# Patient Record
Sex: MALE | Race: WHITE | Employment: FULL TIME | URBAN - METROPOLITAN AREA
[De-identification: names, ages, dates, MRNs, and addresses within clinical notes are randomized per-mention and may not be internally consistent; named-entity substitution may affect disease eponyms.]

---

## 2019-04-01 ENCOUNTER — OFFICE VISIT (OUTPATIENT)
Dept: FAMILY MEDICINE CLINIC | Age: 54
End: 2019-04-01
Payer: COMMERCIAL

## 2019-04-01 ENCOUNTER — TELEPHONE (OUTPATIENT)
Dept: FAMILY MEDICINE CLINIC | Age: 54
End: 2019-04-01

## 2019-04-01 VITALS
WEIGHT: 172 LBS | HEART RATE: 87 BPM | BODY MASS INDEX: 26.07 KG/M2 | DIASTOLIC BLOOD PRESSURE: 84 MMHG | HEIGHT: 68 IN | SYSTOLIC BLOOD PRESSURE: 136 MMHG | OXYGEN SATURATION: 98 %

## 2019-04-01 DIAGNOSIS — R10.13 EPIGASTRIC PAIN: ICD-10-CM

## 2019-04-01 DIAGNOSIS — Z00.00 PHYSICAL EXAM: ICD-10-CM

## 2019-04-01 DIAGNOSIS — Z76.89 ENCOUNTER TO ESTABLISH CARE: ICD-10-CM

## 2019-04-01 DIAGNOSIS — M25.512 LEFT SHOULDER PAIN, UNSPECIFIED CHRONICITY: ICD-10-CM

## 2019-04-01 DIAGNOSIS — Z76.89 ENCOUNTER TO ESTABLISH CARE: Primary | ICD-10-CM

## 2019-04-01 LAB
A/G RATIO: 1.9 (ref 1.1–2.2)
ALBUMIN SERPL-MCNC: 4.3 G/DL (ref 3.4–5)
ALP BLD-CCNC: 98 U/L (ref 40–129)
ALT SERPL-CCNC: 11 U/L (ref 10–40)
ANION GAP SERPL CALCULATED.3IONS-SCNC: 13 MMOL/L (ref 3–16)
ANISOCYTOSIS: ABNORMAL
AST SERPL-CCNC: 14 U/L (ref 15–37)
BASOPHILS ABSOLUTE: 0 K/UL (ref 0–0.2)
BASOPHILS RELATIVE PERCENT: 0.5 %
BILIRUB SERPL-MCNC: 0.3 MG/DL (ref 0–1)
BUN BLDV-MCNC: 15 MG/DL (ref 7–20)
CALCIUM SERPL-MCNC: 8.8 MG/DL (ref 8.3–10.6)
CHLORIDE BLD-SCNC: 103 MMOL/L (ref 99–110)
CHOLESTEROL, TOTAL: 153 MG/DL (ref 0–199)
CO2: 25 MMOL/L (ref 21–32)
CREAT SERPL-MCNC: 0.7 MG/DL (ref 0.9–1.3)
EOSINOPHILS ABSOLUTE: 0.2 K/UL (ref 0–0.6)
EOSINOPHILS RELATIVE PERCENT: 3.2 %
GFR AFRICAN AMERICAN: >60
GFR NON-AFRICAN AMERICAN: >60
GLOBULIN: 2.3 G/DL
GLUCOSE BLD-MCNC: 117 MG/DL (ref 70–99)
HCT VFR BLD CALC: 22.9 % (ref 40.5–52.5)
HDLC SERPL-MCNC: 40 MG/DL (ref 40–60)
HEMATOLOGY PATH CONSULT: YES
HEMOGLOBIN: 6.3 G/DL (ref 13.5–17.5)
HYPOCHROMIA: ABNORMAL
LDL CHOLESTEROL CALCULATED: 84 MG/DL
LYMPHOCYTES ABSOLUTE: 1.2 K/UL (ref 1–5.1)
LYMPHOCYTES RELATIVE PERCENT: 17.4 %
MCH RBC QN AUTO: 15.2 PG (ref 26–34)
MCHC RBC AUTO-ENTMCNC: 27.6 G/DL (ref 31–36)
MCV RBC AUTO: 55.2 FL (ref 80–100)
MICROCYTES: ABNORMAL
MONOCYTES ABSOLUTE: 0.7 K/UL (ref 0–1.3)
MONOCYTES RELATIVE PERCENT: 10.1 %
NEUTROPHILS ABSOLUTE: 4.6 K/UL (ref 1.7–7.7)
NEUTROPHILS RELATIVE PERCENT: 68.8 %
PDW BLD-RTO: 20.2 % (ref 12.4–15.4)
PLATELET # BLD: 369 K/UL (ref 135–450)
PLATELET SLIDE REVIEW: ADEQUATE
PMV BLD AUTO: 8.7 FL (ref 5–10.5)
POIKILOCYTES: ABNORMAL
POTASSIUM SERPL-SCNC: 4.5 MMOL/L (ref 3.5–5.1)
RBC # BLD: 4.14 M/UL (ref 4.2–5.9)
SLIDE REVIEW: ABNORMAL
SODIUM BLD-SCNC: 141 MMOL/L (ref 136–145)
TOTAL PROTEIN: 6.6 G/DL (ref 6.4–8.2)
TRIGL SERPL-MCNC: 145 MG/DL (ref 0–150)
TSH SERPL DL<=0.05 MIU/L-ACNC: 3.94 UIU/ML (ref 0.27–4.2)
VITAMIN D 25-HYDROXY: 17.7 NG/ML
VLDLC SERPL CALC-MCNC: 29 MG/DL
WBC # BLD: 6.6 K/UL (ref 4–11)

## 2019-04-01 PROCEDURE — 99386 PREV VISIT NEW AGE 40-64: CPT | Performed by: NURSE PRACTITIONER

## 2019-04-01 RX ORDER — OMEPRAZOLE 40 MG/1
40 CAPSULE, DELAYED RELEASE ORAL
Qty: 30 CAPSULE | Refills: 2 | Status: SHIPPED | OUTPATIENT
Start: 2019-04-01 | End: 2019-07-05 | Stop reason: SDUPTHER

## 2019-04-01 SDOH — HEALTH STABILITY: MENTAL HEALTH: HOW OFTEN DO YOU HAVE A DRINK CONTAINING ALCOHOL?: NEVER

## 2019-04-01 ASSESSMENT — ENCOUNTER SYMPTOMS
ANAL BLEEDING: 0
EYE ITCHING: 0
NAUSEA: 1
FACIAL SWELLING: 0
EYE REDNESS: 0
RECTAL PAIN: 0
APNEA: 0
EYES NEGATIVE: 1
STRIDOR: 0
SINUS PRESSURE: 0
VOMITING: 0
CHEST TIGHTNESS: 0
DIARRHEA: 0
RHINORRHEA: 0
CHOKING: 0
TROUBLE SWALLOWING: 0
ALLERGIC/IMMUNOLOGIC NEGATIVE: 1
PHOTOPHOBIA: 0
ABDOMINAL PAIN: 1
EYE PAIN: 0
COUGH: 0
ABDOMINAL DISTENTION: 1
HEMATOCHEZIA: 1
BLOOD IN STOOL: 0
FLATUS: 1
EYE DISCHARGE: 0
SHORTNESS OF BREATH: 0
CONSTIPATION: 0
SORE THROAT: 0
WHEEZING: 0
VOICE CHANGE: 0
BELCHING: 1
COLOR CHANGE: 0
SINUS PAIN: 0
BACK PAIN: 0
RESPIRATORY NEGATIVE: 1

## 2019-04-01 ASSESSMENT — PATIENT HEALTH QUESTIONNAIRE - PHQ9
SUM OF ALL RESPONSES TO PHQ QUESTIONS 1-9: 0
2. FEELING DOWN, DEPRESSED OR HOPELESS: 0
1. LITTLE INTEREST OR PLEASURE IN DOING THINGS: 0
SUM OF ALL RESPONSES TO PHQ QUESTIONS 1-9: 0
SUM OF ALL RESPONSES TO PHQ9 QUESTIONS 1 & 2: 0

## 2019-04-01 NOTE — PROGRESS NOTES
Transportation needs:     Medical: Not on file     Non-medical: Not on file   Tobacco Use    Smoking status: Never Smoker    Smokeless tobacco: Never Used   Substance and Sexual Activity    Alcohol use: Not Currently     Frequency: Never    Drug use: Never    Sexual activity: Not on file   Lifestyle    Physical activity:     Days per week: Not on file     Minutes per session: Not on file    Stress: Not on file   Relationships    Social connections:     Talks on phone: Not on file     Gets together: Not on file     Attends Sabianism service: Not on file     Active member of club or organization: Not on file     Attends meetings of clubs or organizations: Not on file     Relationship status: Not on file    Intimate partner violence:     Fear of current or ex partner: Not on file     Emotionally abused: Not on file     Physically abused: Not on file     Forced sexual activity: Not on file   Other Topics Concern    Not on file   Social History Narrative    Not on file       No current outpatient medications on file prior to visit. No current facility-administered medications on file prior to visit. Review of Systems   Constitutional: Negative. Negative for activity change, appetite change, chills, diaphoresis, fatigue, fever and unexpected weight change. HENT: Negative. Negative for congestion, dental problem, drooling, ear discharge, ear pain, facial swelling, hearing loss, mouth sores, nosebleeds, postnasal drip, rhinorrhea, sinus pressure, sinus pain, sneezing, sore throat, tinnitus, trouble swallowing and voice change. Eyes: Negative. Negative for photophobia, pain, discharge, redness, itching and visual disturbance. Respiratory: Negative. Negative for apnea, cough, choking, chest tightness, shortness of breath, wheezing and stridor. Cardiovascular: Negative. Negative for chest pain, palpitations and leg swelling.    Gastrointestinal: Positive for abdominal distention, abdominal No tracheal deviation present. No thyromegaly present. Cardiovascular: Normal rate, regular rhythm, normal heart sounds and intact distal pulses. Exam reveals no gallop and no friction rub. No murmur heard. Pulmonary/Chest: Effort normal and breath sounds normal. No stridor. No respiratory distress. He has no wheezes. He has no rales. He exhibits no tenderness. Abdominal: Soft. Bowel sounds are normal. He exhibits no distension and no mass. There is no tenderness. There is no rebound and no guarding. No hernia. Musculoskeletal: Normal range of motion. He exhibits no edema, tenderness or deformity. Lymphadenopathy:     He has no cervical adenopathy. Neurological: He is alert and oriented to person, place, and time. He displays normal reflexes. No cranial nerve deficit or sensory deficit. He exhibits normal muscle tone. Coordination normal.   Skin: Skin is warm and dry. Capillary refill takes less than 2 seconds. No rash noted. He is not diaphoretic. No erythema. No pallor. Psychiatric: He has a normal mood and affect. His behavior is normal. Judgment and thought content normal.   Nursing note and vitals reviewed. Assessment:       ICD-10-CM    1. Encounter to establish care Z76.89 [facility]     COMPREHENSIVE METABOLIC PANEL     CBC WITH AUTO DIFFERENTIAL     LIPID PANEL     TSH without Reflex     Vitamin D 25 Hydroxy   2. Physical exam Z00.00    3. Left shoulder pain, unspecified chronicity M25.512    4. Epigastric pain R10.13        Plan:     1. Encounter to establish care  Family history of cancer, patient does not know what type  - [facility]  - COMPREHENSIVE METABOLIC PANEL; Future  - CBC WITH AUTO DIFFERENTIAL; Future  - LIPID PANEL; Future  - TSH without Reflex; Future  - Vitamin D 25 Hydroxy; Future    2.  Physical exam  Counseled on importance of healthy diet and regular exercise of at least 30 minutes on four or more days during the week. Counseled on skin safety, SPF 30 or higher prior to going outdoors and reapplication every two hours while outside. Monitor moles for changes, report to provider if greater than 6 mm, color variations, asymmetry, redness, scales, and/or overlying skin changes  Counseled on safety, wear seatbelt, do not consume alcohol and drive or drive with anyone who has consumed alcohol  Counseled on safe sex practices, wear condoms, limit number of sexual partners  Counseled on importance of monthly self testicular exams, monitor for new lumps/bumps/masses, tenderness, new asymmetry, redness, and overlying skin changes, report to provider      3. Left shoulder pain, unspecified chronicity  Possible tendinitis, possible early rotator cuff tear  RICE  Call if symptoms return    4. Epigastric pain  Stop prednisone  Started on Prilosec daily, Gaviscon when necessary  Patient is over 48years old has family history for cancer suggest he undergo a colonoscopy.  Information given for  office to schedule a screening colonoscopy    I will call patient with lab results tomorrow

## 2019-04-01 NOTE — PATIENT INSTRUCTIONS
Thank you for enrolling in [address] Please follow the instructions below to securely access your online medical record. jaeyos allows you to send messages to your doctor, view your test results, renew your prescriptions, schedule appointments, and more. How Do I Sign Up? 1. In your Internet browser, go to [email or web address]  2. Click on the Sign Up Now link in the Sign In box. You will see the New Member Sign Up page. 3. Enter your jaeyos Access Code exactly as it appears below. You will not need to use this code after youve completed the sign-up process. If you do not sign up before the expiration date, you must request a new code. jaeyos Access Code: [email or web address]  Expires: 5/16/2019  1:43 PM    4. Enter your Social Security Number ([id number]) and Date of Birth (mm/dd/yyyy) as indicated and click Submit. You will be taken to the next sign-up page. 5. Create a jaeyos ID. This will be your jaeyos login ID and cannot be changed, so think of one that is secure and easy to remember. 6. Create a jaeyos password. You can change your password at any time. 7. Enter your Password Reset Question and Answer. This can be used at a later time if you forget your password. 8. Enter your e-mail address. You will receive e-mail notification when new information is available in [address] 9. Click Sign Up. You can now view your medical record. Additional Information  If you have questions, please contact your physician practice where you receive care. Remember, jaeyos is NOT to be used for urgent needs. For medical emergencies, dial 911.

## 2019-04-02 ENCOUNTER — INITIAL CONSULT (OUTPATIENT)
Dept: GASTROENTEROLOGY | Age: 54
End: 2019-04-02
Payer: COMMERCIAL

## 2019-04-02 ENCOUNTER — TELEPHONE (OUTPATIENT)
Dept: GASTROENTEROLOGY | Age: 54
End: 2019-04-02

## 2019-04-02 VITALS
HEIGHT: 68 IN | BODY MASS INDEX: 25.91 KG/M2 | SYSTOLIC BLOOD PRESSURE: 140 MMHG | WEIGHT: 171 LBS | DIASTOLIC BLOOD PRESSURE: 80 MMHG

## 2019-04-02 DIAGNOSIS — K92.1 BLOOD IN STOOL: Primary | ICD-10-CM

## 2019-04-02 DIAGNOSIS — D50.9 IRON DEFICIENCY ANEMIA, UNSPECIFIED IRON DEFICIENCY ANEMIA TYPE: ICD-10-CM

## 2019-04-02 LAB — HEMATOLOGY PATH CONSULT: NORMAL

## 2019-04-02 PROCEDURE — 1036F TOBACCO NON-USER: CPT | Performed by: INTERNAL MEDICINE

## 2019-04-02 PROCEDURE — 3017F COLORECTAL CA SCREEN DOC REV: CPT | Performed by: INTERNAL MEDICINE

## 2019-04-02 PROCEDURE — G8427 DOCREV CUR MEDS BY ELIG CLIN: HCPCS | Performed by: INTERNAL MEDICINE

## 2019-04-02 PROCEDURE — 99204 OFFICE O/P NEW MOD 45 MIN: CPT | Performed by: INTERNAL MEDICINE

## 2019-04-02 PROCEDURE — G8419 CALC BMI OUT NRM PARAM NOF/U: HCPCS | Performed by: INTERNAL MEDICINE

## 2019-04-02 RX ORDER — POLYETHYLENE GLYCOL 3350 17 G/17G
238 POWDER ORAL DAILY
Qty: 255 G | Refills: 0 | Status: SHIPPED | OUTPATIENT
Start: 2019-04-02 | End: 2019-04-11 | Stop reason: ALTCHOICE

## 2019-04-02 NOTE — TELEPHONE ENCOUNTER
Got critical lab call from [facility]. Anemia. Spoke to the pt. He is functioning and no SOB, CP, ortho-stasis / light headed. discussed and he is seeing Dr Warner Browne at 1 pm tomorrow.'    Advised him to keep that appointment. If any blood from the rectum of clinically feels owrse, need to go the [facility] ER. Red cell indices suggest blood loss is chronic, and it sounds like he has adapted and is hemodynamically stable. Harris:  Patient is seeing you at 1 PM on Tuesday 4/2.  Labs in [facility]

## 2019-04-02 NOTE — PATIENT INSTRUCTIONS
Ursula Crump    [address] [place] [address] ,  097 [facility]  Phone: [phone] [facility],  [address] [place] [address], [address] [place] [address]  Phone: [phone]    Sedation  Three types of sedation are used for endoscopy and colonoscopy. The standard and most common is called conscious sedation. This is administered by the gastroenterologist and is part of the standard procedure. Common medications used for this are IV forms of a benzodiazepine (most commonly Versed) and a narcotic (most commonly fentanyl). Benadryl and nausea medicines may also be used. The effect of this is to make you comfortable. Most people will actually have amnesia with this and not recall the procedure. Some individuals will have other types of anesthesia provided by an anesthesiologist or nurse anesthetist.  The reason for this is a history of poor sedation, medication use that makes one more resistant to conscious sedation, a medical condition for which conscious sedation is contraindicated or other medical unstable conditions. This usually involves a separate fee from anesthesia. The most common of these is propofol (diprivan sedation) which is a deeper sedative the conscious sedation. In some instances, general anesthesia with intubation (breathing tube) is required. If you need to cancel or reschedule, please do so at least 2 weeks before the procedure, so that we can be considerate to other patients who are waiting to be scheduled.     If you cancel or reschedule less than 7 days before your procedure, you will be placed on a lower priority list.    ENDOSCOPY OVERVIEW  An upper endoscopy, often referred to as endoscopy, EGD, or niaxqryb-hdhwtm-urwiutgwhlml, is a procedure that allows a physician to directly examine the upper part of the gastrointestinal (GI) tract, which includes the esophagus (swallowing tube), the stomach, and the duodenum (the first section of the small intestine)  The physician who performs the procedures, known as an endoscopist, has special training in using an endoscope to examine the upper GI system, looking for inflammation (redness, irritation), bleeding, ulcers, or tumors. REASONS FOR UPPER ENDOSCOPY  The most common reasons for upper endoscopy include:  Unexplained discomfort in the upper abdomen   GERD or gastroesophageal reflux disease, (often called heartburn)   Persistent nausea and vomiting   Upper GI bleeding (vomiting blood or blood found in the stool that originated from the upper part of the gastrointestinal tract). Bleeding can be treated during the endoscopy. Difficulty swallowing; food/liquids getting stuck in the esophagus during swallowing. This may be caused by a narrowing (stricture) or tumor. The stricture may be dilated with special balloons or dilation tubes during the endoscopy. Abnormal or unclear findings on an upper GI x-ray, CT scan or MRI. Removal of a foreign body (a swallowed object). To check healing or progress on previously found polyps (growths), tumors, or ulcers. ENDOSCOPY PREPARATION  You will be given specific instructions regarding how to prepare for the examination before the procedure. These instructions are designed to maximize your safety during and after the examination and to minimize possible complications. It is important to read the instructions ahead of time and follow them carefully. Do not hesitate to call the physician's office or the endoscopy unit if there are questions. Nothing to eat after midnight the day before the test. You may be asked not to eat or drink anything for up to eight hours before the test. It is important for your stomach to be empty to allow the endoscopist to visualize the entire area and to decrease the possibility of food or fluid being vomited into the lungs while under sedation (called aspiration).   You may be asked to adjust the dose of your medications or to gastroscope) is a flexible tube that is about the size of a finger. The scope has a lens and a light source that allows the endoscopist to look into the scope to see the inner lining of the upper gastrointestinal tract, or to view it on a TV monitor. Most people have no difficulty swallowing the flexible gastroscope as a result of the sedating medications. Many people sleep during the test; others are very relaxed and generally not aware of the examination. An alternative procedure called transnasal endoscopy may be available in some facilities. This involves passing a very thin scope (about the size of a drinking straw) through the nose. You are not sedated but a medication is applied to the nose to prevent discomfort. A full examination can be performed with this instrument. The endoscopist may take tissue samples called biopsies (not painful), or perform specific treatments (such as dilation, removal of polyps, treatment of bleeding), depending upon what is found during the examination. Air is introduced through the scope to open the esophagus, stomach, and intestine, allowing the scope to be passed through these structures and improving the endoscopist's ability to see all of the structures. You may experience a mild discomfort as air is pushed into the intestinal tract. This is not harmful and belching may relieve the sensation. The endoscope does not interfere with breathing. Taking slow, deep breaths during the procedure may help you to relax. ENDOSCOPY RECOVERY  After the endoscopy, you will be observed for one to two hours while the sedative medication wears off. The medicines cause most people to temporarily feel tired or have difficulty concentrating and you should not drive or return to work after the procedure. The most common discomfort after the examination is a feeling of bloating as a result of the air introduced during the examination. This usually resolves quickly.  Some patients also have a mild sore throat. Most patients are able to eat shortly after the examination. ENDOSCOPY COMPLICATIONS  Upper endoscopy is a safe procedure and complications are uncommon. The following is a list of possible complications:  Aspiration (inhaling) of food or fluids into the lungs, the risk of which can be minimized by not eating or drinking for the recommended period of time before the examination. The endoscope can cause a tear or hole in the tissue being examined. This is a serious complication but fortunately occurs only rarely. Bleeding can occur from biopsies or the removal of polyps, although it is usually minimal and stops quickly on its own or can be easily controlled. Reactions to the sedative medications are possible; the endoscopy team (doctors and nurses) will ask about previous medication allergies or reactions and about health problems such as heart, lung, kidney, or liver disease. Providing this information to the team ensures a safer examination. The medications may produce irritation in the vein at the site of the intravenous line. If redness, swelling, or discomfort occurs, your should call your endoscopist or primary care provider, or the number given by the nurse at discharge. The following signs and symptoms should be reported immediately:  Severe abdominal pain (more than gas cramps)   A firm, distended abdomen   Vomiting   Any temperature elevation   Difficulty swallowing or severe throat pain   A crunching feeling under the skin of the neck    AFTER UPPER ENDOSCOPY  Most patients tolerate endoscopy very well and feel fine afterwards. Some fatigue is common after the examination, and you should plan to take it easy and relax the rest of the day. The endoscopist can describe the result of their examination before you leave the endoscopy unit. If biopsies have been taken or polyps removed, you should call for results within one to two weeks.     WHERE TO GET MORE INFORMATION  Your healthcare provider is the best source of information for questions and concerns related to your medical problem. The following organizations also provide reliable health information. [employer] of [employer] (www.nlm.nih.gov/medlineplus/healthtopics. html)  The American Society of Gastrointestinal Endoscopy: (www.askasge. org)  Aut[employer] Data of Diabetes and Digestive and Kidney Diseases (http://digestive. niddk.nih.gov/ddiseases/pubs/upperendoscopy/index. htm)  ______________________________________________________________________________________________________________________________________    COLONOSCOPY OVERVIEW  A colonoscopy is an exam of the lower part of the gastrointestinal tract, which is called the colon or large intestine (bowel). Colonoscopy is a safe procedure that provides information other tests may not be able to give. Patients who require colonoscopy often have questions and concerns about the procedure. Colonoscopy is performed by inserting a device called a colonoscope into the anus and advanced through the entire colon. The procedure generally takes between 20 minutes and one hour. Other tests that are sometimes used to screen for colon cancer, like virtual colonoscopy (also called CT colonography), are discussed separately. More detailed information about colonoscopy is available by subscription.     REASONS FOR COLONOSCOPY   The most common reasons for colonoscopy are to evaluate the following:        As a screening exam for colon cancer      Rectal bleeding      A change in bowel habits, like persistent diarrhea      Iron deficiency anemia (a decrease in blood count due to loss of iron)      A family history of colon cancer      As a follow-up test in people with colon polyps or colon cancer      Chronic, unexplained abdominal or rectal pain      An abnormal X-ray exam, like a barium enema or CT scan    COLONOSCOPY PREPARATION  Before colonoscopy, your colon must be completely cleaned out so that the doctor can see any abnormal areas. To clean the colon, you will take a strong laxative and empty your bowels the night before your test.    Your doctor's office will provide specific instructions about how you should prepare for colonoscopy. Be sure to read these instructions ahead of time so you will be prepared for the prep. If you have questions, call the doctor's office in advance. You will need to avoid solid food for at least one day before the test. You should also drink plenty of fluids on the day before the test. You can drink clear liquids up to several hours before your procedure, including:        Water      Clear broth (beef, chicken, or vegetable)      Coffee or tea (without milk)      Ices      Gelatin (avoid red gelatin)    The day or night before the colonoscopy, you will take a laxative in two parts:        A pill that you take by mouth      A powder that is mixed with water    The most common laxative treatment is called Go-Lytely® or Half-Lytely®. You can add a flavoring (included), which, unfortunately, only partially hides the unpleasant taste. Most doctors do not recommend that you add other flavorings to the solution. Refrigerating the solution can make it easier to drink. Drinking this solution may be the most unpleasant part of the exam. You will begin to have watery diarrhea within a short time after drinking the solution. If you become nauseated or vomit while drinking the solution, call your doctor or nurse for instructions. Medicines - You can take most prescription and nonprescription medicines right up to the day of the colonoscopy. Your doctor should tell you what medicines to stop. You should also tell the doctor if you are allergic to any medicines. Some medicines increase the risk of heavy bleeding if you have a biopsy during the colonoscopy.  Ask your doctor how and when to stop these medicines, including warfarin/Coumadin® and clopidogrel/Plavix®. Transportation home - You will be given a sedative (a medicine to help you relax) during the colonoscopy, so you will need someone to take you home after your test. Although you will be awake by the time you go home, the sedative medicines cause changes in reflexes and judgment that can interfere with your ability to make decisions, similar to the effect of alcohol. WHAT TO EXPECT  Before the test, a doctor will review the test, including possible complications, and will ask you to sign a consent form. The nurse will start an IV line in your hand or arm. Your blood pressure and heart rate will be monitored during the test.    THE COLONOSCOPY PROCEDURE  You will be given fluid and medicines through an IV line. Many people sleep during the test, while others are very relaxed, comfortable, and generally not aware. The colonoscope is a flexible tube, approximately the size of the index finger. The scope pumps air into the colon to inflate it and allow the doctor to see the entire lining. You might feel bloating or gas cramps as the air opens the colon. Try not to be embarrassed about passing this gas, and let your doctor know if you are uncomfortable. During the procedure, the doctor might take a biopsy (small pieces of tissue) or remove polyps. Polyps are growths of tissue that can range in size from the tip of a pen to several inches. Most polyps are benign (not cancerous). However, some polyps can become cancerous if allowed to grow for a long time. Having a polyp removed does not hurt. RECOVERY FROM COLONOSCOPY  After the colonoscopy, you will be observed in a recovery area until the effects of the sedative medication wear off. The most common complaint after colonoscopy is a feeling of bloating and gas cramps. You may also feel groggy from the sedation medications. You should not return to work or drive that day.  Most people are able to eat normally after the test. Ask your WHERE TO GET MORE INFORMATION  Your healthcare provider is the best source of information for questions and concerns related to your medical problem. This article will be updated as needed every four months on our Web site (www.[employer].AlterPoint/patients). The following organizations also provide reliable health information. [employer] (www.nlm.nih.gov/medlineplus/colonoscopy.html)  [address] for Gastrointestinal Endoscopy  (www.asge.org/PatientInfoIndex. aspx?uq=111)

## 2019-04-02 NOTE — LETTER
COLONOSCOPY PREP INSTRUCTIONS  MlRALAX SPLIT DOSE   [facility]    Your colonoscopy is scheduled on: _4/4/19 @ 7:00am_   __Pina/Librado_  Arrival Time: __6:00am_   DO NOT EAT OR DRINK (INCLUDING WATER) AFTER: _2:00am after drinking second dose of prep_  's Name_[facility] [phone]  [facility]- [phone]    Keep these papers together; REVIEW ALL OF THEM AT LEAST 7 DAYS BEFORE THE PROCEDURE. Please complete all paperwork; including a current list of your medications, to avoid delays in the admission process. The following instructions must be followed in order to ensure your procedure has optimal outcomes. - KEEP YOUR APPOINTMENT. If for any reason, you are unable to keep your appointment, please notify us within 72 hours before your procedure. - You MUST have a responsible adult to drive you, who MUST remain at our facility the ENTIRE time. If not the procedure will be cancelled. You may go by taxi ONLY if you have a responsible adult with you. You may experience light headedness, dizziness etc., therefore you should have a responsible adult remain with you until the morning after your procedure. - Bring your insurance card and 's license. Call your insurance carrier to verify your benefits, and confirm that our facility is in your network, prior to the procedure date to ensure coverage. The facility name is listed as [facility] or [facility] [address]  and the tax ID# is [id number].  - Due to the safety and privacy of our patients, only one visitor is allowed in the recovery area after the procedure. The center will not be responsible for lost valuables so please leave them at home. - Try to avoid seeds (strawberries, dunia, and rye) for one week prior to your procedure.   - If you have questions after beginning the prep, call between 8:30 am & 4:30pm you will speak to a nurse or medical assistant, after 4:30pm you will reach the physician on call. - Hydration (body fluid) is the most important aspect of an effective and safe prep. If you are not drinking enough fluid you may experience cramping, nausea and dizziness. - Common side effects of the prep are nausea, bloating and shaking chills. If any of these occur, take a break from the prep (30 minutes to 1 hour) and then restart. If unable to complete after that notify the Physician as your procedure may need to be cancelled. Nausea medication or an alternative prep is sometimes called in.  - Do not leave home after starting the prep. Frequent, liquid stools may begin as soon as 15 minutes after the first bottle, although it could take up to 4 hours or longer for your first bowel movement. - Even if your stools are clear and watery in appearance, TAKE ALL OF THE PREP.  - Using baby wipes and nonprescription ointments, such as Desitin, will help with the irritation caused by frequent loose stools. - Due to unforeseen schedule changes, you may be asked to move your appointment time to an earlier/later time slot. To insure that your prep gives you the best results for your Colonoscopy, Please follow all directions closely. 3-5 days prior to your procedure:  1. Begin a low-fiber diet (no corn, dried beans, tomatoes, nuts, seeds, lettuce). 2. No bran or bulking agents. 3. Stop taking your multivitamin or iron supplements.   4. If you currently take Aggrenox, Dipyridamole, Persantine, Fondaparinux sodium (Arixtra), Dalteparin sodium Donne Bride), Warfarin (Coumadin), Clopidogrel (Plavix), Prasugrel (Effient), Enoxaparin, Lovenox, or Heparin, Cilostazol (Pletal), Rivoroxaban (Xarelto), Desirudin (Iprivask), Cyclopentyltrazolopyrimide (Ticagrelor or Brilinta), Cangrelor (Yonathan Bulla), Dabigatran (Pradaxa), Apixaban (Eliquis), Edoxaban (Savaysa)you should have received instructions regarding if and when to discontinue the medication. If you have not, or do not clearly understand the instructions, please call the office for clarification (number listed above). 5. Drink plenty of fluid. 1 day prior to your procedure:  1. Do not eat any SOLID FOOD, beginning with breakfast drink clear liquids only, which includes: Chicken or Beef Broth, Coffee/tea (without milk or creamer) Gatorade/PowerAde (no red or purple), JeIl-O (no red or purple), All Soda (even dark cola), Sorbet/Popsicles (no red or purple), Water If you take Diabetic medications (insulin/oral medication)-reduce the amount by one half on the morning of prep. You may resume the meds once you begin eating again. You must drink 8oz of liquids every hour to avoid dehydration. 2. If you take Diabetic medications (insulin/oral medication) - reduce the amount by one half on morning of prep. You may resume the meds once you begin eating again. 3. Bowel Cleansing Prep  ** 3:00pm Take 4 dulcolax (bisacodyl) tablets with a full glass of water  ** 5:00pm Mix one bottle of Miralax (polyethlene glycol) (238/255gm) in one bottle of Gatorade (64oz). Shake until dissolved. Drink 8 oz every 15 minutes until you have finished half of the solution. MORNING OF PROCEDURE  1. __2:00am__ (5 hours prior to the procedure) Drink the remaining portion of the solution 8 oz. every 15 minutes until completely finished, followed by 8 oz of any of the approved liquids. 2. Take your Blood pressure, Heart and Seizure medication the morning of the procedure with sips of water. 3. Bring inhalers with you. 4. Do not take your Diabetic medication the morning of procedure. 5. You must have a  stay with you during the entire procedure. Dear Patient,      Lawrence Mason will receive a call from the [facility] pre-registration department prior to your GI procedure.  This will help streamline your check-in process on the day of service. During this call a skilled associate will review your demographic and insurance benefits information. The associate will answer any question pertaining to your insurance contract, such as deductible/co-insurance/ co-pay or any other financial concerns. They may offer an amount that you could pay on the day of surgery but this is not a requirement. Thank you for allowing [facility] to be part of your [address], [address] [address] Is located at the intersection of [address] [place] [address] and [facility], next to [facility]. From [address]: Exit at [employer]. Travel on [address] past [facility] and turn right onto [facility].  Then turn left into the main driveway facing the [facility], bare to the right of the driveway and look for the building to the right of the hospital.    If you need further directions, pleae call our main number at [phone]

## 2019-04-02 NOTE — PROGRESS NOTES
[address] [facility],  [address] [place] [address] Ave  [place], [address] [facility], [place], operated by [facility]  Phone: [address]     Chief Complaint   Patient presents with   [facility] Establish Care     NP Abd pain & Rectal bleeding & Extreme Anemia - Ref Dr Arlet Russ     HPI     Thank you PARVEZ Garcia - CNP for asking me to see Sylvie Gongora in consultation. He is a  [2] White [1] 48 y.o. . male seen independently who presents with the following GI complaints:  . 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 He recently received some steroids for tendinitis along with a few days of nsaids and developed suprapubic pain along with 1 dark stool with flecks of blood. No heartburn, dysphagia, weight loss. Father had stage 4 cancer of unknown primary. Received call from his pcp last night to go to ER for critical Hg of 6.3. Not dizzy, sob, etc.  No abdominal pain at this time. HPI elements: location, severity, timing, modifying factors, quality, duration, context and associated signs/symptoms. Last Encounter Reviewed:   Pertinent PMH, FH, SH is reviewed below. Last EGD: long time ago  Last Colonoscopy: ?    Review of available records reveals: Wt Readings from Last 50 Encounters:   04/02/19 171 lb (77.6 kg)   04/01/19 172 lb (78 kg)       No components found for: HGBA1C  BP Readings from Last 3 Encounters:   04/02/19 (!) 140/80   04/01/19 136/84     Health Maintenance   Topic Date Due    Hepatitis C screen  1965    HIV screen  11/24/1980    DTaP/Tdap/Td vaccine (1 - Tdap) 11/24/1984    Lipid screen  11/24/2005    Diabetes screen  11/24/2005    Shingles Vaccine (1 of 2) 11/24/2015    Colon cancer screen colonoscopy  11/24/2015    Flu vaccine (Season Ended) 09/01/2019       No components found for: [facility]   No past medical history on file. FAMILY HISTORY   No family history on file.   SOCIAL HISTORY Social History     Socioeconomic History    Marital status:      Spouse name: Not on file    Number of children: Not on file    Years of education: Not on file    Highest education level: Not on file   Occupational History    Not on file   Social Needs    Financial resource strain: Not on file    Food insecurity:     Worry: Not on file     Inability: Not on file    Transportation needs:     Medical: Not on file     Non-medical: Not on file   Tobacco Use    Smoking status: Never Smoker    Smokeless tobacco: Never Used   Substance and Sexual Activity    Alcohol use: Not Currently     Frequency: Never    Drug use: Never    Sexual activity: Not on file   Lifestyle    Physical activity:     Days per week: Not on file     Minutes per session: Not on file    Stress: Not on file   Relationships    Social connections:     Talks on phone: Not on file     Gets together: Not on file     Attends [employer] service: Not on file     Active member of club or organization: Not on file     Attends meetings of clubs or organizations: Not on file     Relationship status: Not on file    Intimate partner violence:     Fear of current or ex partner: Not on file     Emotionally abused: Not on file     Physically abused: Not on file     Forced sexual activity: Not on file   Other Topics Concern    Not on file   Social History Narrative    Not on file     SURGICAL HISTORY   No past surgical history on file.   CURRENT MEDICATIONS   (This list may include medications prescribed during this encounter as epic can not insert only the list prior to this encounter.)  Current Outpatient Rx   Medication Sig Dispense Refill    bisacodyl (DULCOLAX) 5 MG EC tablet Take 1 tablet by mouth daily as needed for Constipation 4 tablet 0    polyethylene glycol (MIRALAX) POWD powder Take 238 g by mouth daily Take as directed for colonoscopy 255 g 0    omeprazole (PRILOSEC) 40 MG delayed release capsule Take 1 capsule by mouth every morning (before breakfast) 30 capsule 2     ALLERGIES     Allergies   Allergen Reactions    Prednisone & Diphenhydramine Nausea And Vomiting     Pain, swelling, Nausea & Vomiting     IMMUNIZATIONS     There is no immunization history on file for this patient. REVIEW OF SYSTEMS   See HPI for further details and pertinent postiives. Negative for the following:  Constitutional: Negative for weight change. Negative for appetite change and fatigue. HENT: Negative for nosebleeds, sore throat, mouth sores, and voice change. Respiratory: Negative for cough, choking and chest tightness. Cardiovascular: Negative for chest pain   Gastrointestinal: See HPI  Musculoskeletal: Negative for arthralgias. Skin: Negative for pallor. Neurological: Negative for weakness and light-headedness. Hematological: Negative for adenopathy. Does not bruise/bleed easily. Psychiatric/Behavioral: Negative for suicidal ideas. PHYSICAL EXAM   VITAL SIGNS: BP (!) 140/80   Ht 5' 7.99\" (1.727 m)   Wt 171 lb (77.6 kg)   BMI 26.01 kg/m²   Wt Readings from Last 3 Encounters:   04/02/19 171 lb (77.6 kg)   04/01/19 172 lb (78 kg)     Constitutional: Well developed, Well nourished, No acute distress, Non-toxic appearance. HENT: Normocephalic, Atraumatic, Bilateral external ears normal, Oropharynx moist, No oral exudates, Nose normal.   Eyes: Conjunctiva normal, No discharge. Neck: Normal range of motion, No tenderness, Supple, No stridor. Lymphatic: No cervical, subclavian, or axillary lymphadenopathy. Cardiovascular: Normal heart rate, Normal rhythm, No murmurs, No rubs, No gallops. Thorax & Lungs: Normal breath sounds, No respiratory distress, No wheezing, No chest tenderness. No gynecomastia. Abdomen: scars consistent with stated surgeries, no hernias, no HSM, soft NTND   Rectal:  Deferred. Skin: Warm, Dry, No erythema, No rash. No bruising. No spider hemangiomas. Back: No tenderness, No CVA tenderness.    Lower Extremities: Intact distal pulses, No edema, No tenderness, No cyanosis, No clubbing. Neurologic: Alert & oriented x 3, Normal motor function, Normal sensory function, No focal deficits noted. No asterixis. RADIOLOGY/PROCEDURES       FINAL IMPRESSION     Orders Placed This Encounter   Procedures    COLONOSCOPY W/ OR W/O BIOPSY     Scheduling Instructions:      Please provide prep of choice instructions and prescription. General guidelines for holding blood thinners/anticoagulants around endoscopic procedure are but patients are encouraged to check with their prescribing physician. The patient may hold Plavix, Effient, Brilinta 5 days prior to the procedure unless:       A drug eluting stent has been placed within past 12 months. A nondrug eluting stent has been placed within past 1 month. Coumadin may be held 4 days prior to the procedure unless:        Mechanical mitral valve replacement (requires heparin bridge while Coumadin held and is managed by pharmacy)      Pradaxa, Xarelto, Eliquis may be held 2-3 days prior to procedure. According to pharmacokinetics of the drug, package insert, cardiology practice patterns, and T1/2 of theses drugs (12 hrs), Eliquis and Xarelto are held 48hrs prior to any procedure, including major surgical procedures w/o       increased bleeding.  That is usually the standard of care, as coagulation would/should be normalized at 48hrs. Every attempt should be made to maintain ASA 81mg per day throughout the yina-operative period in patients with diagnosis of ASHD. These recommendations may need to be modified by the provider/ based on risk /benefit analysis of the procedure and the patients history. If anticoagulation can not be held because recent cardiac stent, elective endoscopic procedures should be delayed until they have received the minimum duration of recommended antiplatlet therapy and it can safely be held.  Again if unsure, patient should discuss with prescribing physician/service. If anticoagulation can not be stopped, endoscopic procedures can still be performed either diagnostically at a somewhat higher risk. Understand that any therapeutic procedure where anything beyond looking is performed, carries higher risks. For this reason without overt bleeding other testing       such as cologuard may be more appropriate. High risk endoscopic procedures that require stopping antiplatelet and anticoagulation therapy include polypectomy, biliary or pancreatic sphincterotomy, pneumatic or bougie dilation, PEG placement, therapeutic balloon-assisted enteroscopy, EUS and FNA, tumor ablation by any technique,       cystogastrostomy,and treatment of varices. Order Specific Question:   Screening or Diagnostic? Answer:   Diagnostic    EGD     Scheduling Instructions:      Please provide prep of choice instructions and prescription. General guidelines for holding blood thinners/anticoagulants around endoscopic procedure are but patients are encouraged to check with their prescribing physician. The patient may hold Plavix, Effient, Brilinta 5 days prior to the procedure unless:       A drug eluting stent has been placed within past 12 months. A nondrug eluting stent has been placed within past 1 month. Coumadin may be held 4 days prior to the procedure unless:        Mechanical mitral valve replacement (requires heparin bridge while Coumadin held and is managed by pharmacy)      Pradaxa, Xarelto, Eliquis may be held 2-3 days prior to procedure. According to pharmacokinetics of the drug, package insert, cardiology practice patterns, and T1/2 of theses drugs (12 hrs), Eliquis and Xarelto are held 48hrs prior to any procedure, including major surgical procedures w/o       increased bleeding.  That is usually the standard of care, as coagulation would/should be normalized at 48hrs. Every attempt should be made to maintain ASA 81mg per day throughout the yina-operative period in patients with diagnosis of ASHD. These recommendations may need to be modified by the provider/ based on risk /benefit analysis of the procedure and the patients history. If anticoagulation can not be held because recent cardiac stent, elective endoscopic procedures should be delayed until they have received the minimum duration of recommended antiplatlet therapy and it can safely be held. Again if unsure, patient should discuss with prescribing physician/service. If anticoagulation can not be stopped, endoscopic procedures can still be performed either diagnostically at a somewhat higher risk. Understand that any therapeutic procedure where anything beyond looking is performed, carries higher risks. For this reason without overt bleeding other testing       such as cologuard may be more appropriate. High risk endoscopic procedures that require stopping antiplatelet and anticoagulation therapy include polypectomy, biliary or pancreatic sphincterotomy, pneumatic or bougie dilation, PEG placement, therapeutic balloon-assisted enteroscopy, EUS and FNA, tumor ablation by any technique,       cystogastrostomy,and treatment of varices. Order Specific Question:   Screening or Diagnostic? Answer:   Meryl Ugalde was seen today for establish care. Diagnoses and all orders for this visit:    Blood in stool  -     EGD  -     COLONOSCOPY W/ OR W/O BIOPSY  -     bisacodyl (DULCOLAX) 5 MG EC tablet; Take 1 tablet by mouth daily as needed for Constipation  -     polyethylene glycol (MIRALAX) POWD powder; Take 238 g by mouth daily Take as directed for colonoscopy    Iron deficiency anemia, unspecified iron deficiency anemia type  -     EGD  -     COLONOSCOPY W/ OR W/O BIOPSY  -     bisacodyl (DULCOLAX) 5 MG EC tablet;  Take 1 tablet by mouth daily as needed for

## 2019-04-02 NOTE — LETTER
According to pharmacokinetics of the drug, package insert, cardiology practice patterns, and T1/2 of theses drugs (12 hrs), Eliquis and Xarelto are held 48hrs prior to any procedure, including major surgical procedures w/o       increased bleeding.  That is usually the standard of care, as coagulation would/should be normalized at 48hrs. Every attempt should be made to maintain ASA 81mg per day throughout the yina-operative period in patients with diagnosis of ASHD. These recommendations may need to be modified by the provider/ based on risk /benefit analysis of the procedure and the patients history. If anticoagulation can not be held because recent cardiac stent, elective endoscopic procedures should be delayed until they have received the minimum duration of recommended antiplatlet therapy and it can safely be held. Again if unsure, patient should discuss with prescribing physician/service. If anticoagulation can not be stopped, endoscopic procedures can still be performed either diagnostically at a somewhat higher risk. Understand that any therapeutic procedure where anything beyond looking is performed, carries higher risks. For this reason without overt bleeding other testing       such as cologuard may be more appropriate. High risk endoscopic procedures that require stopping antiplatelet and anticoagulation therapy include polypectomy, biliary or pancreatic sphincterotomy, pneumatic or bougie dilation, PEG placement, therapeutic balloon-assisted enteroscopy, EUS and FNA, tumor ablation by any technique,       cystogastrostomy,and treatment of varices. Order Specific Question:   Screening or Diagnostic? Answer:   Diagnostic    EGD     Scheduling Instructions:      Please provide prep of choice instructions and prescription.                General guidelines for holding blood thinners/anticoagulants around endoscopic procedure are but patients are encouraged to check with their prescribing physician. The patient may hold Plavix, Effient, Brilinta 5 days prior to the procedure unless:       A drug eluting stent has been placed within past 12 months. A nondrug eluting stent has been placed within past 1 month. Coumadin may be held 4 days prior to the procedure unless:        Mechanical mitral valve replacement (requires heparin bridge while Coumadin held and is managed by pharmacy)      Pradaxa, Xarelto, Eliquis may be held 2-3 days prior to procedure. According to pharmacokinetics of the drug, package insert, cardiology practice patterns, and T1/2 of theses drugs (12 hrs), Eliquis and Xarelto are held 48hrs prior to any procedure, including major surgical procedures w/o       increased bleeding.  That is usually the standard of care, as coagulation would/should be normalized at 48hrs. Every attempt should be made to maintain ASA 81mg per day throughout the yina-operative period in patients with diagnosis of ASHD. These recommendations may need to be modified by the provider/ based on risk /benefit analysis of the procedure and the patients history. If anticoagulation can not be held because recent cardiac stent, elective endoscopic procedures should be delayed until they have received the minimum duration of recommended antiplatlet therapy and it can safely be held. Again if unsure, patient should discuss with prescribing physician/service. If anticoagulation can not be stopped, endoscopic procedures can still be performed either diagnostically at a somewhat higher risk. Understand that any therapeutic procedure where anything beyond looking is performed, carries higher risks. For this reason without overt bleeding other testing       such as cologuard may be more appropriate. High risk endoscopic procedures that require stopping antiplatelet and anticoagulation therapy include polypectomy, biliary or pancreatic sphincterotomy, pneumatic or bougie dilation, PEG placement, therapeutic balloon-assisted enteroscopy, EUS and FNA, tumor ablation by any technique,       cystogastrostomy,and treatment of varices. Order Specific Question:   Screening or Diagnostic? Answer:   Diagnostic       If you have questions, please do not hesitate to call me. I look forward to following Micki Clayton along with you.     Sincerely,        Kev ALVAREZ 4/2/19 1:35 PM

## 2019-04-03 ENCOUNTER — TELEPHONE (OUTPATIENT)
Dept: GASTROENTEROLOGY | Age: 54
End: 2019-04-03

## 2019-04-03 ENCOUNTER — TELEPHONE (OUTPATIENT)
Dept: FAMILY MEDICINE CLINIC | Age: 54
End: 2019-04-03

## 2019-04-03 NOTE — TELEPHONE ENCOUNTER
Pt states Dr. Martha Elder office is billing the Colonoscopy under Anemia and his insurance will not cover that. He wants to know if you can refer him to another GI doctor.

## 2019-04-04 ENCOUNTER — HOSPITAL ENCOUNTER (OUTPATIENT)
Dept: NURSING | Age: 54
Setting detail: INFUSION SERIES
Discharge: HOME | End: 2019-04-04
Payer: COMMERCIAL

## 2019-04-04 ENCOUNTER — TELEPHONE (OUTPATIENT)
Dept: GASTROENTEROLOGY | Age: 54
End: 2019-04-04

## 2019-04-04 ENCOUNTER — TELEPHONE (OUTPATIENT)
Dept: FAMILY MEDICINE CLINIC | Age: 54
End: 2019-04-04

## 2019-04-04 ENCOUNTER — HOSPITAL ENCOUNTER (OUTPATIENT)
Age: 54
Setting detail: OUTPATIENT SURGERY
Discharge: HOME | End: 2019-04-04
Attending: INTERNAL MEDICINE | Admitting: INTERNAL MEDICINE
Payer: COMMERCIAL

## 2019-04-04 ENCOUNTER — HOSPITAL ENCOUNTER (OUTPATIENT)
Dept: CT IMAGING | Age: 54
Discharge: HOME | End: 2019-04-04
Attending: INTERNAL MEDICINE
Payer: COMMERCIAL

## 2019-04-04 VITALS
HEIGHT: 68 IN | WEIGHT: 171 LBS | TEMPERATURE: 97.9 F | HEART RATE: 76 BPM | BODY MASS INDEX: 25.91 KG/M2 | RESPIRATION RATE: 18 BRPM | SYSTOLIC BLOOD PRESSURE: 117 MMHG | DIASTOLIC BLOOD PRESSURE: 79 MMHG

## 2019-04-04 VITALS
RESPIRATION RATE: 18 BRPM | DIASTOLIC BLOOD PRESSURE: 77 MMHG | TEMPERATURE: 98.4 F | OXYGEN SATURATION: 95 % | HEART RATE: 77 BPM | SYSTOLIC BLOOD PRESSURE: 116 MMHG

## 2019-04-04 DIAGNOSIS — Z12.11 COLON CANCER SCREENING: Primary | ICD-10-CM

## 2019-04-04 DIAGNOSIS — K90.9 IRON MALABSORPTION: ICD-10-CM

## 2019-04-04 DIAGNOSIS — D50.0 IRON DEFICIENCY ANEMIA DUE TO CHRONIC BLOOD LOSS: Primary | ICD-10-CM

## 2019-04-04 DIAGNOSIS — D63.8 ANEMIA, CHRONIC DISEASE: ICD-10-CM

## 2019-04-04 DIAGNOSIS — D49.0 COLON TUMOR: Primary | ICD-10-CM

## 2019-04-04 DIAGNOSIS — D49.0 COLON TUMOR: ICD-10-CM

## 2019-04-04 DIAGNOSIS — D50.0 IRON DEFICIENCY ANEMIA SECONDARY TO BLOOD LOSS (CHRONIC): ICD-10-CM

## 2019-04-04 LAB — CEA: 0.8 NG/ML (ref 0–5)

## 2019-04-04 PROCEDURE — 6360000002 HC RX W HCPCS: Performed by: INTERNAL MEDICINE

## 2019-04-04 PROCEDURE — 2580000003 HC RX 258: Performed by: INTERNAL MEDICINE

## 2019-04-04 PROCEDURE — 82378 CARCINOEMBRYONIC ANTIGEN: CPT

## 2019-04-04 PROCEDURE — 7100000011 HC PHASE II RECOVERY - ADDTL 15 MIN: Performed by: INTERNAL MEDICINE

## 2019-04-04 PROCEDURE — 43235 EGD DIAGNOSTIC BRUSH WASH: CPT | Performed by: INTERNAL MEDICINE

## 2019-04-04 PROCEDURE — 99152 MOD SED SAME PHYS/QHP 5/>YRS: CPT | Performed by: INTERNAL MEDICINE

## 2019-04-04 PROCEDURE — 99153 MOD SED SAME PHYS/QHP EA: CPT | Performed by: INTERNAL MEDICINE

## 2019-04-04 PROCEDURE — 3609010600 HC COLONOSCOPY POLYPECTOMY SNARE/COLD BIOPSY: Performed by: INTERNAL MEDICINE

## 2019-04-04 PROCEDURE — 88305 TISSUE EXAM BY PATHOLOGIST: CPT

## 2019-04-04 PROCEDURE — 7100000010 HC PHASE II RECOVERY - FIRST 15 MIN: Performed by: INTERNAL MEDICINE

## 2019-04-04 PROCEDURE — 45385 COLONOSCOPY W/LESION REMOVAL: CPT | Performed by: INTERNAL MEDICINE

## 2019-04-04 PROCEDURE — 36415 COLL VENOUS BLD VENIPUNCTURE: CPT

## 2019-04-04 PROCEDURE — 3609017100 HC EGD: Performed by: INTERNAL MEDICINE

## 2019-04-04 PROCEDURE — 2709999900 HC NON-CHARGEABLE SUPPLY: Performed by: INTERNAL MEDICINE

## 2019-04-04 PROCEDURE — 3609010300 HC COLONOSCOPY W/BIOPSY SINGLE/MULTIPLE: Performed by: INTERNAL MEDICINE

## 2019-04-04 PROCEDURE — 96365 THER/PROPH/DIAG IV INF INIT: CPT

## 2019-04-04 PROCEDURE — 74177 CT ABD & PELVIS W/CONTRAST: CPT

## 2019-04-04 PROCEDURE — 6360000004 HC RX CONTRAST MEDICATION: Performed by: INTERNAL MEDICINE

## 2019-04-04 PROCEDURE — 45380 COLONOSCOPY AND BIOPSY: CPT | Performed by: INTERNAL MEDICINE

## 2019-04-04 RX ORDER — SODIUM CHLORIDE, SODIUM LACTATE, POTASSIUM CHLORIDE, CALCIUM CHLORIDE 600; 310; 30; 20 MG/100ML; MG/100ML; MG/100ML; MG/100ML
INJECTION, SOLUTION INTRAVENOUS ONCE
Status: COMPLETED | OUTPATIENT
Start: 2019-04-04 | End: 2019-04-04

## 2019-04-04 RX ORDER — DIPHENHYDRAMINE HYDROCHLORIDE 50 MG/ML
INJECTION INTRAMUSCULAR; INTRAVENOUS PRN
Status: DISCONTINUED | OUTPATIENT
Start: 2019-04-04 | End: 2019-04-04 | Stop reason: ALTCHOICE

## 2019-04-04 RX ORDER — MIDAZOLAM HYDROCHLORIDE 5 MG/ML
INJECTION INTRAMUSCULAR; INTRAVENOUS PRN
Status: DISCONTINUED | OUTPATIENT
Start: 2019-04-04 | End: 2019-04-04 | Stop reason: ALTCHOICE

## 2019-04-04 RX ORDER — SODIUM CHLORIDE 9 MG/ML
INJECTION, SOLUTION INTRAVENOUS CONTINUOUS
Status: CANCELLED | OUTPATIENT
Start: 2019-04-05

## 2019-04-04 RX ORDER — SODIUM CHLORIDE 0.9 % (FLUSH) 0.9 %
10 SYRINGE (ML) INJECTION PRN
Status: CANCELLED | OUTPATIENT
Start: 2019-04-05

## 2019-04-04 RX ORDER — FENTANYL CITRATE 50 UG/ML
INJECTION, SOLUTION INTRAMUSCULAR; INTRAVENOUS PRN
Status: DISCONTINUED | OUTPATIENT
Start: 2019-04-04 | End: 2019-04-04 | Stop reason: ALTCHOICE

## 2019-04-04 RX ADMIN — SODIUM CHLORIDE, POTASSIUM CHLORIDE, SODIUM LACTATE AND CALCIUM CHLORIDE: 600; 310; 30; 20 INJECTION, SOLUTION INTRAVENOUS at 06:53

## 2019-04-04 RX ADMIN — FERRIC CARBOXYMALTOSE INJECTION 750 MG: 50 INJECTION, SOLUTION INTRAVENOUS at 10:56

## 2019-04-04 RX ADMIN — IOHEXOL 50 ML: 240 INJECTION, SOLUTION INTRATHECAL; INTRAVASCULAR; INTRAVENOUS; ORAL at 10:06

## 2019-04-04 RX ADMIN — IOPAMIDOL 75 ML: 755 INJECTION, SOLUTION INTRAVENOUS at 10:07

## 2019-04-04 ASSESSMENT — PAIN SCALES - GENERAL
PAINLEVEL_OUTOF10: 0

## 2019-04-04 ASSESSMENT — PAIN - FUNCTIONAL ASSESSMENT: PAIN_FUNCTIONAL_ASSESSMENT: 0-10

## 2019-04-04 NOTE — RESULT ENCOUNTER NOTE
Please call patient with results. No distant mets. Really can't say anything about the other findings until he has surgery. Incidentally colon recall will be 1 years.

## 2019-04-04 NOTE — PROGRESS NOTES
Discharge instructions reviewed with patient/responsible adult and understanding verbalized. Discharge instructions signed and copies given. Patient discharged home with belongings.   Pt discharged and transferred to registration to go to ct scan

## 2019-04-04 NOTE — TELEPHONE ENCOUNTER
Called to get Percert for CT Scan abdomen/pelvis with IV contrast currently in clinical review, office notes faxed to [phone] case # [id number]. IV Iron infusion PA not needed per Karol Stratton at [facility].

## 2019-04-04 NOTE — OP NOTE
below. Rectal examination was performed. There were external hemorrhoids. The colonoscope was inserted into the rectum and advanced under direct vision to the ascending colon. The right colon was examined twice as this increases polyp detection especially if other right colon polyps, older age, male, or kc syndrome. When segments could not be distended with CO2 or air, it was filled/distended with water. The quality of the colonic preparation was good. A careful inspection was made as the colonoscope was withdrawn, including a retroflexed view of the rectum; findings and interventions are described below. Appropriate photodocumentation Was Obtained. If photos taken, they were ordered to be scanned into the medical record. 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 Could not advance scope through it; biopsies were obtained  - PREP: miralax  - Overall difficulty: mild in degree  - Abdominal pressure: no  - Change in position: no  - Anesthesia issues: no  - Medivator use: no    Specimens: Was Obtained    Complications:   None; patient tolerated the procedure well. Disposition:   PACU - hemodynamically stable. Withdrawal Time:  13 minutes    Impression:   -See post-procedure diagnoses. Recommendations:  -Continue acid suppression.  -Await pathology.  -Colonoscopy 1 year assuming right colon resection vs. 2 months after surgery if any proximal colon left. -IV iron  -CT abd pelvis  -CEA  -Follow up with surgery next week. -First degree Family members should begin screening at 36 and every 5  Years.         Catina 40 4/4/19 7:50 AM    [facility] is also a good source of information and support ([email or web address]). Chemotherapy - Chemotherapy is a treatment given to slow or stop the growth of cancer cells. Even after a cancer has been removed with surgery, cancer cells can remain in the body, increasing the risk of the cancer coming back (called a relapse or recurrence). In some people, chemotherapy can eliminate these cancer cells and increase the chance of cure. This type of chemotherapy is called \"adjuvant,\" which means that it is given after curative surgery (where all the tumor was removed). Most treatments involve a combination of several chemotherapy drugs, which are given in a specific order on specific days. Most of the drugs are given into the vein (intravenous, IV), but sometimes a single drug will be recommended, which can be given in pill form. Regardless of the specific type of regimen, most adjuvant treatment regimens for colon cancer last about six months. Your healthcare provider can describe which chemotherapy drugs will be needed, how long treatment will last, and what side effects are expected from your treatment. Who needs chemotherapy? - Chemotherapy is recommended for most people with stage III colon cancer (spread to the lymph nodes) and some people with (node-negative) colon cancer (invasion of the whole thickness of the bowel wall). Chemotherapy is not usually recommended for people with stage I colon cancer (cancer within but not all the way through the bowel wall). Before you begin chemotherapy, it is important to discuss the potential risks and benefits of treatment with your doctor. In some cases, the benefits of chemotherapy (better chance of survival) clearly outweigh the possible risks (chemotherapy side effects like diarrhea, vomiting, hair loss, nerve damage, or more serious risks). Not everyone will have all of these side effects.       In other cases, the small benefit of chemotherapy is not worth the risks. A Web-based tool called Adjuvant! can help you and your healthcare provider estimate the risk of cancer recurrence and the potential benefits of chemotherapy. RECTAL CANCER TREATMENT - The majority of rectal cancers are treated with a combination of surgery, radiation, and chemotherapy. Stage I rectal cancer - Surgery alone may cure the cancer. Stage II and III - Chemotherapy and radiation therapy are typically recommended along with surgery; sometimes, the chemotherapy and radiation are given before surgery (referred to as neoadjuvant chemoradiotherapy), with additional chemotherapy given after surgery. Stage IV - Predominantly treated with chemotherapy, with or without surgery. Neoadjuvant chemoradiotherapy - A combination of chemotherapy and radiation therapy may be recommended before surgery for patients with rectal cancer; this is called neoadjuvant chemoradiotherapy. This treatment can shrink the tumor before it is removed, reduces the risk that the cancer will come back, and may reduce the chances that you will need a permanent colostomy. The two most common ways to take chemotherapy during radiation therapy are:        A pump that fits into a pack you wear around your waist. The pump delivers the medicine (called 5-FU) into a port (an IV in your chest) continuously for about six weeks during radiation treatments. A daily dose of a pill called capecitabine. The pill is more convenient because you do not need a pump. Although 5-FU given by an IV pump is the \"standard\" treatment, capecitabine may be as effective as 5-FU given by pump. Studies are underway to confirm this. Discuss all the potential risks and benefits of capecitabine with your doctor. Surgery - Surgery removes the cancerous part of the rectum and the associated lymph nodes. Sometimes, this will require that the anus be removed along with the rectum.  If the anus and rectum have to be removed, the surgeon will sew the remaining intestine to an opening in the skin on the abdomen. The opening is called a colostomy. You will wear a bag over the opening to collect bowel movements. (See 'Life with a colostomy' above.)    The type of surgery you have depends upon where your tumor is located and how far it has spread. Ask your surgeon to describe which surgery is right for you. Treatment after surgery - Postoperative (adjuvant) chemotherapy is typically recommended after surgery. The type of treatment you have after surgery depends upon the stage of your cancer, as well as the treatment you had before surgery        If your tumor is stage II or III, and you did not have chemoradiotherapy before surgery, you will probably have it after surgery. This is called adjuvant chemoradiotherapy      If you had chemoradiotherapy before surgery, you will probably need approximately four months of chemotherapy alone (without further radiation therapy) after surgery. CLINICAL TRIALS - Progress in treating cancer requires that better treatments be identified through clinical trials, which are conducted all over the world. A clinical trial is a carefully controlled way to study the effectiveness of new treatments or new combinations of known therapies. Ask for more information about clinical trials, or read about clinical trials at:        www.cancer.gov/clinicaltrials/learning/      http://clinicaltrials.gov/    FOLLOW-UP AFTER TREATMENT - After completing treatment for colorectal cancer, it is important to follow up with your healthcare team. Puma Verona will need appointments on a regular basis for a few years to monitor for signs that the cancer has recurred. Your follow-up schedule may differ slightly, but most people will have the following:        A full colonoscopy is recommended before or after surgery to look for polyps or other cancers that were not seen previously.  Colonoscopy is usually repeated one and four years after surgery, and every five years thereafter. If polyps or new cancers are found, this schedule may be adjusted. Visits with your healthcare provider are usually scheduled every three to four months for the first three years, every six months for two years, and then once per year. Most visits will include a discussion of how you are feeling and a physical examination. A blood test for a colon cancer tumor marker (CEA) may be done at each visit. A CT scan is usually recommended once per year for three years in people who have been treated for stage II or III colon cancer. For people who still have their anus after treatment for rectal cancer and who did not have radiation therapy, experts recommend a flexible proctosigmoidoscopy every six months for five years. COLON CANCER AND YOUR FAMILY - Having colon cancer means that your family may be at an increased risk of developing colon cancer. If you have one parent, brother, sister, or child with colorectal cancer or polyps at a young age (before the age of 61 years), or two relatives diagnosed at any age, you should begin screening for colon cancer earlier, typically at age 36, or 8 years younger than the earliest diagnosis in your family, whichever comes first. Colon cancer screening is discussed separately. Certain genetic conditions increase the risk of colon cancer. The most common conditions include Morgan syndrome or hereditary nonpolyposis colon cancer (HNPCC), and familial adenomatous polyposis (FAP). If you have a strong family history of colon cancer (two or more close relatives), talk to your doctor about the need for genetic counseling and possible genetic testing. Although the idea of genetic testing can be frightening, the results of genetic tests can help determine whether you and your family need further treatment, testing, or both.     WHERE TO GET MORE INFORMATION - Your healthcare provider is the best source of information for questions and concerns related to your medical problem. This article will be updated as needed every four months on our Web site ([email or web address]/patients). The following organizations also provide reliable health information. [address]        5-196-0-CANCER        (www.nci.nih.gov)      American Society of Clinical Oncology        (www.cancer. net/portal/site/patient)      [facility]        (www.[email or web address].co)      [employer] of Medicine        (www.nlm.nih.gov/medlineplus/healthtopics. html)      \"Colon Cancer Can Run in the SCH[place]" [facility]). Available at www. insight-group.org/links/CRCbook/. Patient support - There are a number of online forums where patients can find information and support from other people with similar conditions. About. com Cancer Forum        (http://cancer. about.com/forum)

## 2019-04-04 NOTE — H&P
[address],  [facility] [address] [facility]  Phone: [phone]     CHIEF COMPLAINT           Chief Complaint   Patient presents with   Card Establish Care       NP Abd pain & Rectal bleeding & Extreme Anemia - Ref Dr Trace Cabrales      HPI      Thank you PARVEZ Marquez - CNP for asking me to see Kian Odell in consultation. He is a  [2] White [1] 48 y.o. . male seen independently who presents with the following GI complaints:  . 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 He recently received some steroids for tendinitis along with a few days of nsaids and developed suprapubic pain along with 1 dark stool with flecks of blood. No heartburn, dysphagia, weight loss. Father had stage 4 cancer of unknown primary. Received call from his pcp last night to go to ER for critical Hg of 6.3. Not dizzy, sob, etc.  No abdominal pain at this time.     HPI elements: location, severity, timing, modifying factors, quality, duration, context and associated signs/symptoms.     Last Encounter Reviewed:   Pertinent PMH, FH, SH is reviewed below. Last EGD: long time ago  Last Colonoscopy: ?     Review of available records reveals: Wt Readings from Last 50 Encounters:   04/02/19 171 lb (77.6 kg)   04/01/19 172 lb (78 kg)         No components found for: HGBA1C      BP Readings from Last 3 Encounters:   04/02/19 (!) 140/80   04/01/19 136/84           Health Maintenance   Topic Date Due    Hepatitis C screen  1965    HIV screen  11/24/1980    DTaP/Tdap/Td vaccine (1 - Tdap) 11/24/1984    Lipid screen  11/24/2005    Diabetes screen  11/24/2005    Shingles Vaccine (1 of 2) 11/24/2015    Colon cancer screen colonoscopy  11/24/2015    Flu vaccine (Season Ended) 09/01/2019         No components found for: SURGICALPATH      PAST MEDICAL HISTORY   Past Medical History   No past medical history on file. FAMILY HISTORY   Family History   No family history on file. SOCIAL HISTORY      Social History               Socioeconomic History    Marital status:        Spouse name: Not on file    Number of children: Not on file    Years of education: Not on file    Highest education level: Not on file   Occupational History    Not on file   Social Needs    Financial resource strain: Not on file    Food insecurity:       Worry: Not on file       Inability: Not on file    Transportation needs:       Medical: Not on file       Non-medical: Not on file   Tobacco Use    Smoking status: Never Smoker    Smokeless tobacco: Never Used   Substance and Sexual Activity    Alcohol use: Not Currently       Frequency: Never    Drug use: Never    Sexual activity: Not on file   Lifestyle    Physical activity:       Days per week: Not on file       Minutes per session: Not on file    Stress: Not on file   Relationships    Social connections:       Talks on phone: Not on file       Gets together: Not on file       Attends Faith service: Not on file       Active member of club or organization: Not on file       Attends meetings of clubs or organizations: Not on file       Relationship status: Not on file    Intimate partner violence:       Fear of current or ex partner: Not on file       Emotionally abused: Not on file       Physically abused: Not on file       Forced sexual activity: Not on file   Other Topics Concern    Not on file   Social History Narrative    Not on file         SURGICAL HISTORY   Past Surgical History   No past surgical history on file.      CURRENT MEDICATIONS   (This list may include medications prescribed during this encounter as epic can not insert only the list prior to this encounter.)  Current Outpatient Rx   Current Outpatient Rx   Medication Sig Dispense Refill    bisacodyl (DULCOLAX) 5 MG EC tablet Take 1 tablet by mouth daily as needed for Constipation 4 tablet 0    polyethylene glycol (MIRALAX) POWD powder Take 238 g by mouth daily Take as directed for colonoscopy 255 g 0    omeprazole (PRILOSEC) 40 MG delayed release capsule Take 1 capsule by mouth every morning (before breakfast) 30 capsule 2         ALLERGIES            Allergies   Allergen Reactions    Prednisone & Diphenhydramine Nausea And Vomiting       Pain, swelling, Nausea & Vomiting      IMMUNIZATIONS      There is no immunization history on file for this patient. REVIEW OF SYSTEMS   See HPI for further details and pertinent postiives. Negative for the following:  Constitutional: Negative for weight change. Negative for appetite change and fatigue. HENT: Negative for nosebleeds, sore throat, mouth sores, and voice change. Respiratory: Negative for cough, choking and chest tightness. Cardiovascular: Negative for chest pain   Gastrointestinal: See HPI  Musculoskeletal: Negative for arthralgias. Skin: Negative for pallor. Neurological: Negative for weakness and light-headedness. Hematological: Negative for adenopathy. Does not bruise/bleed easily. Psychiatric/Behavioral: Negative for suicidal ideas. PHYSICAL EXAM   VITAL SIGNS: BP (!) 140/80   Ht 5' 7.99\" (1.727 m)   Wt 171 lb (77.6 kg)   BMI 26.01 kg/m²       Wt Readings from Last 3 Encounters:   04/02/19 171 lb (77.6 kg)   04/01/19 172 lb (78 kg)      Constitutional: Well developed, Well nourished, No acute distress, Non-toxic appearance. HENT: Normocephalic, Atraumatic, Bilateral external ears normal, Oropharynx moist, No oral exudates, Nose normal.   Eyes: Conjunctiva normal, No discharge. Neck: Normal range of motion, No tenderness, Supple, No stridor. Lymphatic: No cervical, subclavian, or axillary lymphadenopathy. Cardiovascular: Normal heart rate, Normal rhythm, No murmurs, No rubs, No gallops. Thorax & Lungs: Normal breath sounds, No respiratory distress, No wheezing, No chest tenderness. No gynecomastia.   Abdomen: scars consistent with stated surgeries, no hernias, no HSM, soft NTND   Rectal:  Deferred. Skin: Warm, Dry, No erythema, No rash. No bruising. No spider hemangiomas. Back: No tenderness, No CVA tenderness. Lower Extremities: Intact distal pulses, No edema, No tenderness, No cyanosis, No clubbing. Neurologic: Alert & oriented x 3, Normal motor function, Normal sensory function, No focal deficits noted. No asterixis. RADIOLOGY/PROCEDURES         FINAL IMPRESSION             Orders Placed This Encounter   Procedures    COLONOSCOPY W/ OR W/O BIOPSY       Scheduling Instructions:         Please provide prep of choice instructions and prescription.                     General guidelines for holding blood thinners/anticoagulants around endoscopic procedure are but patients are encouraged to check with their prescribing physician.                   The patient may hold Plavix, Effient, Brilinta 5 days prior to the procedure unless:          A drug eluting stent has been placed within past 12 months.         A nondrug eluting stent has been placed within past 1 month.         Coumadin may be held 4 days prior to the procedure unless:           Mechanical mitral valve replacement (requires heparin bridge while Coumadin held and is managed by pharmacy)         Pradaxa, Xarelto, Eliquis may be held 2-3 days prior to procedure. According to pharmacokinetics of the drug, package insert, cardiology practice patterns, and T1/2 of theses drugs (12 hrs), Eliquis and Xarelto are held 48hrs prior to any procedure, including major surgical procedures w/o          increased bleeding.  That is usually the standard of care, as coagulation would/should be normalized at 48hrs.         Every attempt should be made to maintain ASA 81mg per day throughout the yina-operative period in patients with diagnosis of ASHD.           These recommendations may need to be modified by the provider/ based on risk /benefit analysis of the procedure and the patients history.                   If anticoagulation can not be held because recent cardiac stent, elective endoscopic procedures should be delayed until they have received the minimum duration of recommended antiplatlet therapy and it can safely be held. Again if unsure, patient should discuss with prescribing physician/service.                   If anticoagulation can not be stopped, endoscopic procedures can still be performed either diagnostically at a somewhat higher risk. Understand that any therapeutic procedure where anything beyond looking is performed, carries higher risks.   For this reason without overt bleeding other testing          such as cologuard may be more appropriate.                     High risk endoscopic procedures that require stopping antiplatelet and anticoagulation therapy include polypectomy, biliary or pancreatic sphincterotomy, pneumatic or bougie dilation, PEG placement, therapeutic balloon-assisted enteroscopy, EUS and FNA, tumor ablation by any technique,          cystogastrostomy,and treatment of varices.       Order Specific Question:   Screening or Diagnostic?       Answer:   Diagnostic    EGD       Scheduling Instructions:         Please provide prep of choice instructions and prescription.                     General guidelines for holding blood thinners/anticoagulants around endoscopic procedure are but patients are encouraged to check with their prescribing physician.                   The patient may hold Plavix, Effient, Brilinta 5 days prior to the procedure unless:          A drug eluting stent has been placed within past 12 months.         A nondrug eluting stent has been placed within past 1 month.         Coumadin may be held 4 days prior to the procedure unless:           Mechanical mitral valve replacement (requires heparin bridge while Coumadin held and is managed by pharmacy)         Pradaxa, Xarelto, Eliquis may be held 2-3 days prior to procedure. According to pharmacokinetics of the drug, package insert, cardiology practice patterns, and T1/2 of theses drugs (12 hrs), Eliquis and Xarelto are held 48hrs prior to any procedure, including major surgical procedures w/o          increased bleeding.  That is usually the standard of care, as coagulation would/should be normalized at 48hrs.         Every attempt should be made to maintain ASA 81mg per day throughout the yina-operative period in patients with diagnosis of ASHD.       These recommendations may need to be modified by the provider/ based on risk /benefit analysis of the procedure and the patients history.                   If anticoagulation can not be held because recent cardiac stent, elective endoscopic procedures should be delayed until they have received the minimum duration of recommended antiplatlet therapy and it can safely be held. Again if unsure, patient should discuss with prescribing physician/service.                   If anticoagulation can not be stopped, endoscopic procedures can still be performed either diagnostically at a somewhat higher risk. Understand that any therapeutic procedure where anything beyond looking is performed, carries higher risks.   For this reason without overt bleeding other testing          such as cologuard may be more appropriate.                     High risk endoscopic procedures that require stopping antiplatelet and anticoagulation therapy include polypectomy, biliary or pancreatic sphincterotomy, pneumatic or bougie dilation, PEG placement, therapeutic balloon-assisted enteroscopy, EUS and FNA, tumor ablation by any technique,          cystogastrostomy,and treatment of varices.       Order Specific Question:   Screening or Diagnostic?       Answer:   Diagnostic      Juve Ordonez was seen today for establish care.     Diagnoses and all orders for this visit:     Blood in stool  -     EGD  -     COLONOSCOPY W/ OR W/O BIOPSY  -     bisacodyl (DULCOLAX) 5 MG EC tablet; Take 1 tablet by mouth daily as needed for Constipation  -     polyethylene glycol (MIRALAX) POWD powder; Take 238 g by mouth daily Take as directed for colonoscopy     Iron deficiency anemia, unspecified iron deficiency anemia type  -     EGD  -     COLONOSCOPY W/ OR W/O BIOPSY  -     bisacodyl (DULCOLAX) 5 MG EC tablet; Take 1 tablet by mouth daily as needed for Constipation  -     polyethylene glycol (MIRALAX) POWD powder;  Take 238 g by mouth daily Take as directed for colonoscopy        ORDERED FUTURE/PENDING TESTS         FOLLOWUP   Return for EGD & Colonoscopy          Catina 40 4/4/19 7:07 AM

## 2019-04-05 ENCOUNTER — TELEPHONE (OUTPATIENT)
Dept: GASTROENTEROLOGY | Age: 54
End: 2019-04-05

## 2019-04-05 ENCOUNTER — HOSPITAL ENCOUNTER (OUTPATIENT)
Dept: NURSING | Age: 54
Setting detail: INFUSION SERIES
End: 2019-04-05
Payer: COMMERCIAL

## 2019-04-05 NOTE — RESULT ENCOUNTER NOTE
Please call patient with normal results. Remind about ability to get results, make appointments, and communicate with us through [employer] since not signed up.

## 2019-04-08 ENCOUNTER — INITIAL CONSULT (OUTPATIENT)
Dept: SURGERY | Age: 54
End: 2019-04-08
Payer: COMMERCIAL

## 2019-04-08 ENCOUNTER — TELEPHONE (OUTPATIENT)
Dept: GASTROENTEROLOGY | Age: 54
End: 2019-04-08

## 2019-04-08 VITALS
HEART RATE: 76 BPM | WEIGHT: 171.4 LBS | DIASTOLIC BLOOD PRESSURE: 89 MMHG | BODY MASS INDEX: 25.98 KG/M2 | SYSTOLIC BLOOD PRESSURE: 141 MMHG | HEIGHT: 68 IN

## 2019-04-08 DIAGNOSIS — D64.9 ANEMIA, UNSPECIFIED TYPE: ICD-10-CM

## 2019-04-08 DIAGNOSIS — K63.89 COLONIC MASS: Primary | ICD-10-CM

## 2019-04-08 DIAGNOSIS — C18.2 MALIGNANT NEOPLASM OF ASCENDING COLON (HCC): ICD-10-CM

## 2019-04-08 PROCEDURE — 3017F COLORECTAL CA SCREEN DOC REV: CPT | Performed by: SURGERY

## 2019-04-08 PROCEDURE — G8427 DOCREV CUR MEDS BY ELIG CLIN: HCPCS | Performed by: SURGERY

## 2019-04-08 PROCEDURE — 99243 OFF/OP CNSLTJ NEW/EST LOW 30: CPT | Performed by: SURGERY

## 2019-04-08 PROCEDURE — G8419 CALC BMI OUT NRM PARAM NOF/U: HCPCS | Performed by: SURGERY

## 2019-04-08 NOTE — TELEPHONE ENCOUNTER
Corrina Jules  Please call patient and ask him to call Dr. Jayda Gallegos office and they can change the colonoscopy and make it a diagnostic colonoscopy as a screening which is what I put. The anemia that should be covered.  But I would recommend to patient that he not change GI doctors at this time and Dr. Herson Gamez is excellent

## 2019-04-08 NOTE — TELEPHONE ENCOUNTER
Discussed this entire  manner with Deisi are . When the patient first saw me as a new patient physical he denied any symptoms at all. I ordered a screening colonoscopy as trying to keep his health maintenance up-to-date. It was only until that evening when critical labs were called in to the on-call physician Dr. Trey Kan who then called Dr. Noel Michele himself and had everything changed to a diagnostic colonoscopy.

## 2019-04-08 NOTE — TELEPHONE ENCOUNTER
I called Dr. Renny Nam office to ask about changing the diagnosis or the order to screening colonoscopy. They states once the pt has been seen for consult, the diagnosis is already entered into the system & if the patient complains of anything the colonoscopy is automatically changed to a diagnostic screening. I asked what should be done to help get this colonoscopy covered by insurance & was told it is just how things work & can not get it done differently to be covered.

## 2019-04-08 NOTE — PROGRESS NOTES
Department of General Surgery - Adult   Clinic History and Physical      PATIENT NAME: Philip Buchanan   YOB: 1965    ADMISSION DATE: No admission date for patient encounter. TODAY'S DATE: 2019    CHIEF COMPLAINT:  Right colon mass       HISTORY OF PRESENT ILLNESS:  The patient is a 48 y.o. male  who presents with right colon mass. He was referred by his gastroenterologist who discovered a large tumor in his ascending colon (unable to pass scope through it) as well as a 10 mm pedunculated polyp located in transverse colon. The colonoscopy was being done as part of a work up for his anemia (Hgb of 6.3). Pathology of the 10 mm polyp showed adenoma with high grade dysplasia. He denies noting blood in stool, except for one time. He denies nausea, vomiting. He has noticed ~ 15 lbs weigh loss over the last several months. He also notes some fatigue. He states that his father  of cancer at age [de-identified], but he is not sure of what type of cancer it was. His mother is still alive, no cancer. ROS is otherwise negative. Past Medical History:    History reviewed. No pertinent past medical history. Past Surgical History:        Procedure Laterality Date    COLONOSCOPY N/A 2019    COLONOSCOPY WITH BIOPSY performed by Christopher Fermin MD at [place] [address]  2019    COLONOSCOPY POLYPECTOMY SNARE/COLD BIOPSY performed by Christopher Fermin MD at [place] [address] N/A 2019    EGD performed by Christopher Fermin MD at [address] [place] [address]       Medications Prior to Admission:   Prior to Admission medications    Medication Sig Start Date End Date Taking?  Authorizing Provider   omeprazole (PRILOSEC) 40 MG delayed release capsule Take 1 capsule by mouth every morning (before breakfast) 19  Yes PARVEZ Russ CNP   ferric carboxymaltose (INJECTAFER) 750 MG/15ML SOLN IV solution Infuse, Repeat second infusion 7 days later 4/4/19   Janell Ludwig MD   bisacodyl (DULCOLAX) 5 MG EC tablet Take 1 tablet by mouth daily as needed for Constipation 4/2/19   Janell Ludwig MD   polyethylene glycol [facility]) POWD powder Take 238 g by mouth daily Take as directed for colonoscopy 4/2/19   Janell Ludwig MD       Allergies:  Prednisone    Social History:   TOBACCO:   reports that he has never smoked. He has never used smokeless tobacco.  ETOH:   reports that he drank alcohol. DRUGS:   reports that he does not use drugs. Family History:       Problem Relation Age of Onset    Other Mother         diverticulitis    Diabetes Father     Cancer Father         unsure       REVIEW OF SYSTEMS:    See HPI. PHYSICAL EXAM:    VITALS:  BP (!) 141/89 (Site: Right Wrist, Position: Sitting, Cuff Size: Medium Adult)   Pulse 76   Ht 5' 8\" (1.727 m)   Wt 171 lb 6.4 oz (77.7 kg)   BMI 26.06 kg/m²     CONSTITUTIONAL:  fatigued, alert, no apparent distress and thin  ENT:  normocepalic, dermoid cyst noted on posterior left scalp, pale mucus membranes   NECK:  supple, symmetrical, trachea midline   LUNGS:  clear to auscultation  CARDIOVASCULAR:  regular rate  ABDOMEN: Soft, NT, ND, no LAD  MUSCULOSKELETAL: No apparent abnormalities   NEUROLOGIC:  Mental Status Exam:  Level of Alertness:   awake  Orientation:   person, place, time  SKIN:  pale      DATA:  CBC: No results for input(s): WBC, HGB, HCT, PLT in the last 72 hours. BMP:  No results for input(s): NA, K, CL, CO2, BUN, CREATININE, GLUCOSE in the last 72 hours. Hepatic: No results for input(s): AST, ALT, ALB, BILITOT, ALKPHOS in the last 72 hours. Mag:    No results for input(s): MG in the last 72 hours. Phos:   No results for input(s): PHOS in the last 72 hours. INR: No results for input(s): INR in the last 72 hours. Radiology Review: Images personally reviewed by me.          ASSESSMENT AND PLAN: 49 yo male with newly diagnosed colon cancer     - Will obtain CT chest non contrast to complete staging work up prior to operation  - Will ask GI to repeat colonoscopy to tattoo the spot where the 1 cm poly on the transverse colon was found as it will have to be included in resection   - Will repeat labs today to assess for transfusion needs   - Plan for OR likely early next week     Electronically signed by Horacio Sanchez MD     [address]    Patient seen and agree with above. Some abdominal bloating and weight loss. No gross blood in bowel movements prior to colonoscopy. No nausea. Discussed pathology with patient along with plans for robotic partial colectomy. Possibility of open procedure reviewed. Discussed case with GI who feels dysplastic polyp was completely removed. CT of chest ordered. Recheck labs to see if transfusion needed.   Plan OR next week    Alejandra Keys MD

## 2019-04-08 NOTE — TELEPHONE ENCOUNTER
Spoke to Katty , [address] [facility]. This was sent in as a screening. She understands this is now diagnostic but unfortunately with a diagnosis, it can't be changed back.   [facility]

## 2019-04-09 ENCOUNTER — TELEPHONE (OUTPATIENT)
Dept: SURGERY | Age: 54
End: 2019-04-09

## 2019-04-09 ENCOUNTER — HOSPITAL ENCOUNTER (OUTPATIENT)
Age: 54
Discharge: HOME | End: 2019-04-09
Payer: COMMERCIAL

## 2019-04-09 ENCOUNTER — HOSPITAL ENCOUNTER (OUTPATIENT)
Dept: CT IMAGING | Age: 54
Discharge: HOME | End: 2019-04-09
Payer: COMMERCIAL

## 2019-04-09 DIAGNOSIS — K63.89 COLONIC MASS: ICD-10-CM

## 2019-04-09 DIAGNOSIS — D64.9 ANEMIA, UNSPECIFIED TYPE: ICD-10-CM

## 2019-04-09 PROBLEM — C18.2 MALIGNANT NEOPLASM OF ASCENDING COLON (HCC): Status: ACTIVE | Noted: 2019-04-09

## 2019-04-09 LAB
ALBUMIN SERPL-MCNC: 4.2 G/DL (ref 3.4–5)
ANION GAP SERPL CALCULATED.3IONS-SCNC: 10 MMOL/L (ref 3–16)
BASOPHILS ABSOLUTE: 0.1 K/UL (ref 0–0.2)
BASOPHILS RELATIVE PERCENT: 0.9 %
BUN BLDV-MCNC: 10 MG/DL (ref 7–20)
CALCIUM SERPL-MCNC: 8.9 MG/DL (ref 8.3–10.6)
CHLORIDE BLD-SCNC: 106 MMOL/L (ref 99–110)
CO2: 25 MMOL/L (ref 21–32)
CREAT SERPL-MCNC: 0.8 MG/DL (ref 0.9–1.3)
EOSINOPHILS ABSOLUTE: 0.2 K/UL (ref 0–0.6)
EOSINOPHILS RELATIVE PERCENT: 3.3 %
GFR AFRICAN AMERICAN: >60
GFR NON-AFRICAN AMERICAN: >60
GLUCOSE BLD-MCNC: 74 MG/DL (ref 70–99)
HCT VFR BLD CALC: 26.3 % (ref 40.5–52.5)
HEMATOLOGY PATH CONSULT: NO
HEMOGLOBIN: 7.3 G/DL (ref 13.5–17.5)
LYMPHOCYTES ABSOLUTE: 1.2 K/UL (ref 1–5.1)
LYMPHOCYTES RELATIVE PERCENT: 16.6 %
MCH RBC QN AUTO: 17.2 PG (ref 26–34)
MCHC RBC AUTO-ENTMCNC: 27.8 G/DL (ref 31–36)
MCV RBC AUTO: 62.1 FL (ref 80–100)
MONOCYTES ABSOLUTE: 0.8 K/UL (ref 0–1.3)
MONOCYTES RELATIVE PERCENT: 10.3 %
NEUTROPHILS ABSOLUTE: 5.2 K/UL (ref 1.7–7.7)
NEUTROPHILS RELATIVE PERCENT: 68.9 %
PDW BLD-RTO: 20 % (ref 12.4–15.4)
PHOSPHORUS: 3.4 MG/DL (ref 2.5–4.9)
PLATELET # BLD: 342 K/UL (ref 135–450)
PMV BLD AUTO: 8.7 FL (ref 5–10.5)
POTASSIUM SERPL-SCNC: 4.2 MMOL/L (ref 3.5–5.1)
RBC # BLD: 4.24 M/UL (ref 4.2–5.9)
SODIUM BLD-SCNC: 141 MMOL/L (ref 136–145)
WBC # BLD: 7.5 K/UL (ref 4–11)

## 2019-04-09 PROCEDURE — 71250 CT THORAX DX C-: CPT

## 2019-04-09 PROCEDURE — 85025 COMPLETE CBC W/AUTO DIFF WBC: CPT

## 2019-04-09 PROCEDURE — 80069 RENAL FUNCTION PANEL: CPT

## 2019-04-09 PROCEDURE — 36415 COLL VENOUS BLD VENIPUNCTURE: CPT

## 2019-04-09 NOTE — TELEPHONE ENCOUNTER
Pt called back in. I let him know that I realized after I left the message that it was a week old & he already had the colonoscopy. He states he has more testing to make sure cancer is not in more areas. He still may go for an infusion depending on his blood results. Pt is appreciative of Black Quiroga CNP being proactive and all of this process has been very smooth.

## 2019-04-11 ENCOUNTER — TELEPHONE (OUTPATIENT)
Dept: SURGERY | Age: 54
End: 2019-04-11

## 2019-04-11 ENCOUNTER — HOSPITAL ENCOUNTER (OUTPATIENT)
Dept: NURSING | Age: 54
Setting detail: INFUSION SERIES
Discharge: HOME | End: 2019-04-11
Payer: COMMERCIAL

## 2019-04-11 ENCOUNTER — OFFICE VISIT (OUTPATIENT)
Dept: FAMILY MEDICINE CLINIC | Age: 54
End: 2019-04-11
Payer: COMMERCIAL

## 2019-04-11 VITALS
SYSTOLIC BLOOD PRESSURE: 125 MMHG | HEART RATE: 65 BPM | RESPIRATION RATE: 18 BRPM | DIASTOLIC BLOOD PRESSURE: 81 MMHG | BODY MASS INDEX: 25.91 KG/M2 | HEIGHT: 68 IN | TEMPERATURE: 97.9 F | WEIGHT: 171 LBS

## 2019-04-11 VITALS
SYSTOLIC BLOOD PRESSURE: 118 MMHG | BODY MASS INDEX: 25.42 KG/M2 | WEIGHT: 167.2 LBS | TEMPERATURE: 98.1 F | HEART RATE: 74 BPM | DIASTOLIC BLOOD PRESSURE: 82 MMHG | RESPIRATION RATE: 18 BRPM

## 2019-04-11 DIAGNOSIS — C18.2 MALIGNANT NEOPLASM OF ASCENDING COLON (HCC): ICD-10-CM

## 2019-04-11 DIAGNOSIS — Z01.818 PREOP EXAMINATION: ICD-10-CM

## 2019-04-11 DIAGNOSIS — D50.0 IRON DEFICIENCY ANEMIA DUE TO CHRONIC BLOOD LOSS: Primary | ICD-10-CM

## 2019-04-11 DIAGNOSIS — D63.8 ANEMIA, CHRONIC DISEASE: ICD-10-CM

## 2019-04-11 DIAGNOSIS — D49.0 COLON TUMOR: Primary | ICD-10-CM

## 2019-04-11 PROCEDURE — 3017F COLORECTAL CA SCREEN DOC REV: CPT | Performed by: NURSE PRACTITIONER

## 2019-04-11 PROCEDURE — G8419 CALC BMI OUT NRM PARAM NOF/U: HCPCS | Performed by: NURSE PRACTITIONER

## 2019-04-11 PROCEDURE — 99243 OFF/OP CNSLTJ NEW/EST LOW 30: CPT | Performed by: NURSE PRACTITIONER

## 2019-04-11 PROCEDURE — G8427 DOCREV CUR MEDS BY ELIG CLIN: HCPCS | Performed by: NURSE PRACTITIONER

## 2019-04-11 PROCEDURE — 2580000003 HC RX 258: Performed by: INTERNAL MEDICINE

## 2019-04-11 PROCEDURE — 6360000002 HC RX W HCPCS: Performed by: INTERNAL MEDICINE

## 2019-04-11 PROCEDURE — 96365 THER/PROPH/DIAG IV INF INIT: CPT

## 2019-04-11 RX ADMIN — FERRIC CARBOXYMALTOSE INJECTION 750 MG: 50 INJECTION, SOLUTION INTRAVENOUS at 10:13

## 2019-04-11 ASSESSMENT — ENCOUNTER SYMPTOMS
DIARRHEA: 1
SORE THROAT: 0
CONSTIPATION: 0
WHEEZING: 0
CHEST TIGHTNESS: 0
EYE ITCHING: 0
SINUS PRESSURE: 0
SHORTNESS OF BREATH: 0
EYE REDNESS: 0
ABDOMINAL PAIN: 1
TROUBLE SWALLOWING: 0
COLOR CHANGE: 0
NAUSEA: 0
COUGH: 0

## 2019-04-11 ASSESSMENT — PAIN - FUNCTIONAL ASSESSMENT: PAIN_FUNCTIONAL_ASSESSMENT: 0-10

## 2019-04-11 NOTE — TELEPHONE ENCOUNTER
Advised patient he is scheduled for robotic right colectomy on Tuesday, April 16, 2019 at [facility]. Arrive at 6:00 a.m., surgery at 7:30 a.m. Clear liquids day before surgery, NPO after midnight. Tai Jacobsen NP, to do H&P. He will get Type & Screen, CBC & BMP morning of surgery.

## 2019-04-11 NOTE — PROGRESS NOTES
Preoperative Consultation      Meghana Kumar  YOB: 1965    Date of Service:  4/11/2019    Vitals:    04/11/19 1257   BP: 118/82   Site: Right Upper Arm   Position: Sitting   Pulse: 74   Resp: 18   Temp: 98.1 °F (36.7 °C)   TempSrc: Oral   Weight: 167 lb 3.2 oz (75.8 kg)     Wt Readings from Last 2 Encounters:   04/11/19 167 lb 3.2 oz (75.8 kg)   04/11/19 171 lb (77.6 kg)     BP Readings from Last 3 Encounters:   04/11/19 118/82   04/11/19 125/81   04/08/19 (!) 141/89        Chief Complaint   Patient presents with   Merary Kauffman Pre-op Exam     colon cancer. surgery on 4/16/19 w/ Dr. Lety Hernandez @ [facility]     Allergies   Allergen Reactions    Prednisone Nausea And Vomiting     Outpatient Medications Marked as Taking for the 4/11/19 encounter (Office Visit) with PARVEZ Diggs CNP   Medication Sig Dispense Refill    ferric carboxymaltose (INJECTAFER) 750 MG/15ML SOLN IV solution Infuse, Repeat second infusion 7 days later 1 vial 1    omeprazole (PRILOSEC) 40 MG delayed release capsule Take 1 capsule by mouth every morning (before breakfast) 30 capsule 2       This patient presents to the office today for a preoperative consultation at [facility] of surgeon, Dr. Jairon Allison, who plans on performing partial colectomy on April16 at [address] [place] [address].  The current problembegan 1 monthago, and symptoms have been unchanged with time.   Conservative therapy:No.    Planned anesthesia: General   Known anesthesia problems:None,  NoFamily history of problems with anesthesia   Bleeding risk: No recent or remote history of abnormal bleeding  Personal or FHof DVT/PE: No  Patient objectionto receiving blood products: No    Patient Active Problem List   Diagnosis    Iron deficiency anemia due to chronic blood loss    Polyp of transverse colon    Colon tumor    Iron malabsorption    Iron deficiency anemia secondary to blood loss (chronic)    Anemia, chronic disease    Malignant neoplasm of ascending colon [facility])       Past Medical History:   Diagnosis Date    Malignant neoplasm of ascending colon ([address] 4/9/2019     Past Surgical History:   Procedure Laterality Date    COLONOSCOPY N/A 4/4/2019    COLONOSCOPY WITH BIOPSY performed by Kelly Fisher MD at [address]  4/4/2019    COLONOSCOPY POLYPECTOMY SNARE/COLD BIOPSY performed by Kelly Fisher MD at [address] ENDOSCOPY N/A 4/4/2019    EGD performed by Kelly Fisher MD at [address] History   Problem Relation Age of Onset    Other Mother         diverticulitis    Diabetes Father     Cancer Father         unsure     Social History     Socioeconomic History    Marital status:      Spouse name: Not on file    Number of children: Not on file    Years of education: Not on file    Highest education level: Not on file   Occupational History    Not on file   Social Needs    Financial resource strain: Not on file    Food insecurity:     Worry: Not on file     Inability: Not on file    Transportation needs:     Medical: Not on file     Non-medical: Not on file   Tobacco Use    Smoking status: Never Smoker    Smokeless tobacco: Never Used   Substance and Sexual Activity    Alcohol use: Not Currently     Frequency: Never    Drug use: Never    Sexual activity: Not on file   Lifestyle    Physical activity:     Days per week: Not on file     Minutes per session: Not on file    Stress: Not on file   Relationships    Social connections:     Talks on phone: Not on file     Gets together: Not on file     Attends Zoroastrianism service: Not on file     Active member of club or organization: Not on file     Attends meetings of clubs or organizations: Not on file     Relationship status: Not on file    Intimate partner violence:     Fear of current or ex partner: Not on file     Emotionally abused: Not on file     Physically abused: Not on file     Forced sexual activity: Not on file   Other Topics Concern    Not on file   Social History Narrative    Not on file       Review of Systems    Review of Systems   Constitutional: Positive for fatigue. Negative for activity change, chills, fever and unexpected weight change. HENT: Negative for ear discharge, mouth sores, postnasal drip, sinus pressure, sore throat and trouble swallowing. Eyes: Negative for redness, itching and visual disturbance. Contacts and glasses   Respiratory: Negative for cough, chest tightness, shortness of breath and wheezing. Cardiovascular: Negative for chest pain, palpitations and leg swelling. Gastrointestinal: Positive for abdominal pain (right lower) and diarrhea. Negative for constipation and nausea. Endocrine: Negative for cold intolerance, heat intolerance, polydipsia, polyphagia and polyuria. Genitourinary: Negative for dysuria, frequency and urgency. Musculoskeletal: Negative for arthralgias, joint swelling and myalgias. Skin: Negative for color change, pallor and rash. Allergic/Immunologic: Negative for environmental allergies, food allergies and immunocompromised state. Neurological: Negative for dizziness, syncope, weakness and headaches. Hematological: Does not bruise/bleed easily. Psychiatric/Behavioral: Negative for behavioral problems, hallucinations and sleep disturbance. The patient is not nervous/anxious. PhysicalExam     Physical Exam   Constitutional: He is oriented to person, place, and time. He appears well-developed and well-nourished. No distress. HENT:   Head: Normocephalic and atraumatic. Right Ear: External ear normal.   Left Ear: External ear normal.   Nose: Nose normal.   Mouth/Throat: Oropharynx is clear and moist. No oropharyngeal exudate. Eyes: Conjunctivae and EOM are normal. Right eye exhibits no discharge. Left eye exhibits no discharge. Neck: Normal range of motion. Neck supple.    Cardiovascular: Basophils # 04/09/2019 0.1    Admission on 04/04/2019, Discharged on 04/04/2019   Component Date Value    CEA 04/04/2019 0.8    Orders Only on 04/01/2019   Component Date Value    Vit D, 25-Hydroxy 04/01/2019 17.7*    TSH 04/01/2019 3.94     Cholesterol, Total 04/01/2019 153     Triglycerides 04/01/2019 145     HDL 04/01/2019 40     LDL Calculated 04/01/2019 84     VLDL Cholesterol Calcula* 04/01/2019 29     WBC 04/01/2019 6.6     RBC 04/01/2019 4.14*    Hemoglobin 04/01/2019 6.3*    Hematocrit 04/01/2019 22.9*    MCV 04/01/2019 55.2*    MCH 04/01/2019 15.2*    MCHC 04/01/2019 27.6*    RDW 04/01/2019 20.2*    Platelets 57/78/5411 369     MPV 04/01/2019 8.7     PLATELET SLIDE REVIEW 04/01/2019 Adequate     SLIDE REVIEW 04/01/2019 see below     Path Consult 04/01/2019 Yes     Neutrophils % 04/01/2019 68.8     Lymphocytes % 04/01/2019 17.4     Monocytes % 04/01/2019 10.1     Eosinophils % 04/01/2019 3.2     Basophils % 04/01/2019 0.5     Neutrophils # 04/01/2019 4.6     Lymphocytes # 04/01/2019 1.2     Monocytes # 04/01/2019 0.7     Eosinophils # 04/01/2019 0.2     Basophils # 04/01/2019 0.0     Anisocytosis 04/01/2019 3+*    Microcytes 04/01/2019 3+*    Hypochromia 04/01/2019 1+*    Poikilocytes 04/01/2019 1+*    Sodium 04/01/2019 141     Potassium 04/01/2019 4.5     Chloride 04/01/2019 103     CO2 04/01/2019 25     Anion Gap 04/01/2019 13     Glucose 04/01/2019 117*    BUN 04/01/2019 15     CREATININE 04/01/2019 0.7*    GFR Non- 04/01/2019 >60     GFR  04/01/2019 >60     Calcium 04/01/2019 8.8     Total Protein 04/01/2019 6.6     Alb 04/01/2019 4.3     Albumin/Globulin Ratio 04/01/2019 1.9     Total Bilirubin 04/01/2019 0.3     Alkaline Phosphatase 04/01/2019 98     ALT 04/01/2019 11     AST 04/01/2019 14*    Globulin 04/01/2019 2.3     Path Consult 04/01/2019 Reviewed            Assessment:       48 y.o. patient with

## 2019-04-15 ENCOUNTER — ANESTHESIA EVENT (OUTPATIENT)
Dept: OPERATING ROOM | Age: 54
DRG: 331 | End: 2019-04-15
Payer: COMMERCIAL

## 2019-04-15 ENCOUNTER — TELEPHONE (OUTPATIENT)
Dept: FAMILY MEDICINE CLINIC | Age: 54
End: 2019-04-15

## 2019-04-15 NOTE — ANESTHESIA PRE PROCEDURE
Department of Anesthesiology  Preprocedure Note       Name:  Indra Camarillo   Age:  48 y.o.  :  1965                                          MRN:  [id number]         Date:  4/15/2019      Surgeon: Pilar Castellanos):  Jorge Kwan MD    Procedure: ROBOTIC LAPAROSCOPIC RIGHT COLECTOMY, POSSIBLE OPEN PROCEDURE  CPT CODE - 18499 (Right )    Medications prior to admission:   Prior to Admission medications    Medication Sig Start Date End Date Taking? Authorizing Provider   ferric carboxymaltose (INJECTAFER) 750 MG/15ML SOLN IV solution Infuse, Repeat second infusion 7 days later 19   Kerry Quach MD   omeprazole (PRILOSEC) 40 MG delayed release capsule Take 1 capsule by mouth every morning (before breakfast) 19   Artist PARVEZ Jamil - CNP       Current medications:    No current facility-administered medications for this encounter. Current Outpatient Medications   Medication Sig Dispense Refill    ferric carboxymaltose (INJECTAFER) 750 MG/15ML SOLN IV solution Infuse, Repeat second infusion 7 days later 1 vial 1    omeprazole (PRILOSEC) 40 MG delayed release capsule Take 1 capsule by mouth every morning (before breakfast) 30 capsule 2       Allergies:     Allergies   Allergen Reactions    Prednisone Nausea And Vomiting       Problem List:    Patient Active Problem List   Diagnosis Code    Iron deficiency anemia due to chronic blood loss D50.0    Polyp of transverse colon D12.3    Colon tumor D49.0    Iron malabsorption K90.9    Iron deficiency anemia secondary to blood loss (chronic) D50.0    Anemia, chronic disease D63.8    Malignant neoplasm of ascending colon (HCC) C18.2       Past Medical History:        Diagnosis Date    GERD (gastroesophageal reflux disease)     resolved    Malignant neoplasm of ascending colon [address] [address] 2019       Past Surgical History:        Procedure Laterality Date    COLONOSCOPY N/A 2019    COLONOSCOPY WITH BIOPSY performed by HCG, HCGQUANT     ABGs: No results found for: PHART, PO2ART, YCR5NGD, NDF7ZPP, BEART, W5PTIXKZ     Type & Screen (If Applicable):  No results found for: LABABO, [address]    Anesthesia Evaluation  Patient summary reviewed and Nursing notes reviewed no history of anesthetic complications:   Airway: Mallampati: II     Neck ROM: full   Dental:          Pulmonary:Negative Pulmonary ROS and normal exam                               Cardiovascular:Negative CV ROS                      Neuro/Psych:   Negative Neuro/Psych ROS              GI/Hepatic/Renal: Neg GI/Hepatic/Renal ROS  (+) GERD: well controlled,      (-) hiatal hernia       Endo/Other: Negative Endo/Other ROS                    Abdominal:           Vascular:                                      Anesthesia Plan      general     ASA 2     (I discussed with the patient the risks and benefits of PIV, general anesthesia, IV Narcotics, PACU. All questions were answered the patient agrees with the plan.)  Induction: intravenous. Pre-Operative Diagnosis: COLON CARCINOMA, ASCENDING    48 y.o.   BMI:  Body mass index is 24.82 kg/m².      Vitals:    04/11/19 1630 04/16/19 0626   BP:  (!) 156/99   Pulse:  82   Resp:  14   Temp:  98 °F (36.7 °C)   TempSrc:  Temporal   SpO2:  100%   Weight: 167 lb (75.8 kg) 163 lb 4 oz (74 kg)   Height: 5' 8\" (1.727 m) 5' 8\" (1.727 m)       Allergies   Allergen Reactions    Prednisone Nausea And Vomiting       Social History     Tobacco Use    Smoking status: Never Smoker    Smokeless tobacco: Never Used   Substance Use Topics    Alcohol use: Not Currently     Frequency: Never       LABS:    CBC  Lab Results   Component Value Date/Time    WBC 7.5 04/09/2019 10:31 AM    HGB 7.3 (L) 04/09/2019 10:31 AM    HCT 26.3 (L) 04/09/2019 10:31 AM     04/09/2019 10:31 AM     RENAL  Lab Results   Component Value Date/Time     04/09/2019 10:31 AM    K 4.2 04/09/2019 10:31 AM     04/09/2019 10:31 AM    CO2 25 04/09/2019 10:31 AM    BUN 10 04/09/2019 10:31 AM    CREATININE 0.8 (L) 04/09/2019 10:31 AM    GLUCOSE 74 04/09/2019 10:31 AM     COAGS  No results found for: PROTIME, INR, APTT    Gabriela Garner MD   4/15/2019

## 2019-04-16 ENCOUNTER — HOSPITAL ENCOUNTER (INPATIENT)
Age: 54
LOS: 5 days | Discharge: HOME | DRG: 331 | End: 2019-04-21
Attending: SURGERY | Admitting: SURGERY
Payer: COMMERCIAL

## 2019-04-16 ENCOUNTER — ANESTHESIA (OUTPATIENT)
Dept: OPERATING ROOM | Age: 54
DRG: 331 | End: 2019-04-16
Payer: COMMERCIAL

## 2019-04-16 VITALS — OXYGEN SATURATION: 100 % | DIASTOLIC BLOOD PRESSURE: 72 MMHG | SYSTOLIC BLOOD PRESSURE: 128 MMHG

## 2019-04-16 DIAGNOSIS — C18.2 CARCINOMA OF ASCENDING COLON (HCC): ICD-10-CM

## 2019-04-16 DIAGNOSIS — R79.89 LOW VITAMIN D LEVEL: Primary | ICD-10-CM

## 2019-04-16 PROBLEM — C18.9 COLON CANCER (HCC): Status: ACTIVE | Noted: 2019-04-16

## 2019-04-16 LAB
ABO/RH: NORMAL
ANION GAP SERPL CALCULATED.3IONS-SCNC: 9 MMOL/L (ref 3–16)
ANTIBODY SCREEN: NORMAL
BASOPHILS ABSOLUTE: 0.1 K/UL (ref 0–0.2)
BASOPHILS RELATIVE PERCENT: 2.4 %
BUN BLDV-MCNC: 8 MG/DL (ref 7–20)
CALCIUM SERPL-MCNC: 8.9 MG/DL (ref 8.3–10.6)
CHLORIDE BLD-SCNC: 105 MMOL/L (ref 99–110)
CO2: 25 MMOL/L (ref 21–32)
CREAT SERPL-MCNC: 0.7 MG/DL (ref 0.9–1.3)
EOSINOPHILS ABSOLUTE: 0.2 K/UL (ref 0–0.6)
EOSINOPHILS RELATIVE PERCENT: 4.8 %
GFR AFRICAN AMERICAN: >60
GFR NON-AFRICAN AMERICAN: >60
GLUCOSE BLD-MCNC: 86 MG/DL (ref 70–99)
HCT VFR BLD CALC: 31.6 % (ref 40.5–52.5)
HEMATOLOGY PATH CONSULT: NO
HEMOGLOBIN: 9.4 G/DL (ref 13.5–17.5)
LYMPHOCYTES ABSOLUTE: 0.7 K/UL (ref 1–5.1)
LYMPHOCYTES RELATIVE PERCENT: 14.6 %
MCH RBC QN AUTO: 20.2 PG (ref 26–34)
MCHC RBC AUTO-ENTMCNC: 29.7 G/DL (ref 31–36)
MCV RBC AUTO: 67.9 FL (ref 80–100)
MONOCYTES ABSOLUTE: 0.3 K/UL (ref 0–1.3)
MONOCYTES RELATIVE PERCENT: 7.7 %
NEUTROPHILS ABSOLUTE: 3.2 K/UL (ref 1.7–7.7)
NEUTROPHILS RELATIVE PERCENT: 70.5 %
PDW BLD-RTO: 38.3 % (ref 12.4–15.4)
PLATELET # BLD: 310 K/UL (ref 135–450)
PMV BLD AUTO: 8.7 FL (ref 5–10.5)
POTASSIUM REFLEX MAGNESIUM: 4.8 MMOL/L (ref 3.5–5.1)
RBC # BLD: 4.65 M/UL (ref 4.2–5.9)
SODIUM BLD-SCNC: 139 MMOL/L (ref 136–145)
WBC # BLD: 4.5 K/UL (ref 4–11)

## 2019-04-16 PROCEDURE — 2500000003 HC RX 250 WO HCPCS: Performed by: SURGERY

## 2019-04-16 PROCEDURE — 7100000001 HC PACU RECOVERY - ADDTL 15 MIN: Performed by: SURGERY

## 2019-04-16 PROCEDURE — 2580000003 HC RX 258: Performed by: STUDENT IN AN ORGANIZED HEALTH CARE EDUCATION/TRAINING PROGRAM

## 2019-04-16 PROCEDURE — 2720000010 HC SURG SUPPLY STERILE: Performed by: SURGERY

## 2019-04-16 PROCEDURE — S2900 ROBOTIC SURGICAL SYSTEM: HCPCS | Performed by: SURGERY

## 2019-04-16 PROCEDURE — 3600000009 HC SURGERY ROBOT BASE: Performed by: SURGERY

## 2019-04-16 PROCEDURE — 85025 COMPLETE CBC W/AUTO DIFF WBC: CPT

## 2019-04-16 PROCEDURE — 2580000003 HC RX 258: Performed by: ANESTHESIOLOGY

## 2019-04-16 PROCEDURE — 2709999900 HC NON-CHARGEABLE SUPPLY: Performed by: SURGERY

## 2019-04-16 PROCEDURE — 94761 N-INVAS EAR/PLS OXIMETRY MLT: CPT

## 2019-04-16 PROCEDURE — 94770 HC ETCO2 MONITOR DAILY: CPT

## 2019-04-16 PROCEDURE — 7100000000 HC PACU RECOVERY - FIRST 15 MIN: Performed by: SURGERY

## 2019-04-16 PROCEDURE — 1200000000 HC SEMI PRIVATE

## 2019-04-16 PROCEDURE — 44205 LAP COLECTOMY PART W/ILEUM: CPT | Performed by: SURGERY

## 2019-04-16 PROCEDURE — 86901 BLOOD TYPING SEROLOGIC RH(D): CPT

## 2019-04-16 PROCEDURE — 2580000003 HC RX 258: Performed by: SURGERY

## 2019-04-16 PROCEDURE — 6360000002 HC RX W HCPCS: Performed by: NURSE ANESTHETIST, CERTIFIED REGISTERED

## 2019-04-16 PROCEDURE — 6360000002 HC RX W HCPCS

## 2019-04-16 PROCEDURE — 6360000002 HC RX W HCPCS: Performed by: STUDENT IN AN ORGANIZED HEALTH CARE EDUCATION/TRAINING PROGRAM

## 2019-04-16 PROCEDURE — 0DTF4ZZ RESECTION OF RIGHT LARGE INTESTINE, PERCUTANEOUS ENDOSCOPIC APPROACH: ICD-10-PCS | Performed by: SURGERY

## 2019-04-16 PROCEDURE — 86850 RBC ANTIBODY SCREEN: CPT

## 2019-04-16 PROCEDURE — 2500000003 HC RX 250 WO HCPCS: Performed by: NURSE ANESTHETIST, CERTIFIED REGISTERED

## 2019-04-16 PROCEDURE — 86900 BLOOD TYPING SEROLOGIC ABO: CPT

## 2019-04-16 PROCEDURE — 8E0W4CZ ROBOTIC ASSISTED PROCEDURE OF TRUNK REGION, PERCUTANEOUS ENDOSCOPIC APPROACH: ICD-10-PCS | Performed by: SURGERY

## 2019-04-16 PROCEDURE — 2700000000 HC OXYGEN THERAPY PER DAY

## 2019-04-16 PROCEDURE — 3700000001 HC ADD 15 MINUTES (ANESTHESIA): Performed by: SURGERY

## 2019-04-16 PROCEDURE — 80048 BASIC METABOLIC PNL TOTAL CA: CPT

## 2019-04-16 PROCEDURE — 36415 COLL VENOUS BLD VENIPUNCTURE: CPT

## 2019-04-16 PROCEDURE — 88309 TISSUE EXAM BY PATHOLOGIST: CPT

## 2019-04-16 PROCEDURE — 3700000000 HC ANESTHESIA ATTENDED CARE: Performed by: SURGERY

## 2019-04-16 PROCEDURE — 6360000002 HC RX W HCPCS: Performed by: ANESTHESIOLOGY

## 2019-04-16 PROCEDURE — 3600000019 HC SURGERY ROBOT ADDTL 15MIN: Performed by: SURGERY

## 2019-04-16 RX ORDER — ONDANSETRON 2 MG/ML
4 INJECTION INTRAMUSCULAR; INTRAVENOUS PRN
Status: DISCONTINUED | OUTPATIENT
Start: 2019-04-16 | End: 2019-04-16 | Stop reason: HOSPADM

## 2019-04-16 RX ORDER — MEPERIDINE HYDROCHLORIDE 50 MG/ML
12.5 INJECTION INTRAMUSCULAR; INTRAVENOUS; SUBCUTANEOUS EVERY 5 MIN PRN
Status: DISCONTINUED | OUTPATIENT
Start: 2019-04-16 | End: 2019-04-16 | Stop reason: HOSPADM

## 2019-04-16 RX ORDER — SODIUM CHLORIDE, SODIUM LACTATE, POTASSIUM CHLORIDE, CALCIUM CHLORIDE 600; 310; 30; 20 MG/100ML; MG/100ML; MG/100ML; MG/100ML
INJECTION, SOLUTION INTRAVENOUS CONTINUOUS
Status: DISCONTINUED | OUTPATIENT
Start: 2019-04-16 | End: 2019-04-16

## 2019-04-16 RX ORDER — SODIUM CHLORIDE 0.9 % (FLUSH) 0.9 %
10 SYRINGE (ML) INJECTION PRN
Status: DISCONTINUED | OUTPATIENT
Start: 2019-04-16 | End: 2019-04-21 | Stop reason: HOSPADM

## 2019-04-16 RX ORDER — MIDAZOLAM HYDROCHLORIDE 1 MG/ML
2 INJECTION INTRAMUSCULAR; INTRAVENOUS ONCE
Status: COMPLETED | OUTPATIENT
Start: 2019-04-16 | End: 2019-04-16

## 2019-04-16 RX ORDER — LABETALOL HYDROCHLORIDE 5 MG/ML
5 INJECTION, SOLUTION INTRAVENOUS EVERY 10 MIN PRN
Status: DISCONTINUED | OUTPATIENT
Start: 2019-04-16 | End: 2019-04-16 | Stop reason: HOSPADM

## 2019-04-16 RX ORDER — ONDANSETRON 2 MG/ML
INJECTION INTRAMUSCULAR; INTRAVENOUS PRN
Status: DISCONTINUED | OUTPATIENT
Start: 2019-04-16 | End: 2019-04-16 | Stop reason: SDUPTHER

## 2019-04-16 RX ORDER — MORPHINE SULFATE 2 MG/ML
2 INJECTION, SOLUTION INTRAMUSCULAR; INTRAVENOUS EVERY 5 MIN PRN
Status: COMPLETED | OUTPATIENT
Start: 2019-04-16 | End: 2019-04-16

## 2019-04-16 RX ORDER — LIDOCAINE HYDROCHLORIDE 10 MG/ML
0.3 INJECTION, SOLUTION EPIDURAL; INFILTRATION; INTRACAUDAL; PERINEURAL
Status: DISCONTINUED | OUTPATIENT
Start: 2019-04-16 | End: 2019-04-16

## 2019-04-16 RX ORDER — SODIUM CHLORIDE 0.9 % (FLUSH) 0.9 %
10 SYRINGE (ML) INJECTION EVERY 12 HOURS SCHEDULED
Status: DISCONTINUED | OUTPATIENT
Start: 2019-04-16 | End: 2019-04-16 | Stop reason: HOSPADM

## 2019-04-16 RX ORDER — MORPHINE SULFATE 2 MG/ML
1 INJECTION, SOLUTION INTRAMUSCULAR; INTRAVENOUS EVERY 5 MIN PRN
Status: DISCONTINUED | OUTPATIENT
Start: 2019-04-16 | End: 2019-04-16 | Stop reason: HOSPADM

## 2019-04-16 RX ORDER — BUPIVACAINE HYDROCHLORIDE AND EPINEPHRINE 5; 5 MG/ML; UG/ML
INJECTION, SOLUTION PERINEURAL PRN
Status: DISCONTINUED | OUTPATIENT
Start: 2019-04-16 | End: 2019-04-16

## 2019-04-16 RX ORDER — MIDAZOLAM HYDROCHLORIDE 1 MG/ML
INJECTION INTRAMUSCULAR; INTRAVENOUS
Status: DISPENSED
Start: 2019-04-16 | End: 2019-04-17

## 2019-04-16 RX ORDER — PROPOFOL 10 MG/ML
INJECTION, EMULSION INTRAVENOUS PRN
Status: DISCONTINUED | OUTPATIENT
Start: 2019-04-16 | End: 2019-04-16 | Stop reason: SDUPTHER

## 2019-04-16 RX ORDER — SODIUM CHLORIDE 0.9 % (FLUSH) 0.9 %
10 SYRINGE (ML) INJECTION EVERY 12 HOURS SCHEDULED
Status: DISCONTINUED | OUTPATIENT
Start: 2019-04-16 | End: 2019-04-21 | Stop reason: HOSPADM

## 2019-04-16 RX ORDER — HYDROMORPHONE HCL 110MG/55ML
PATIENT CONTROLLED ANALGESIA SYRINGE INTRAVENOUS PRN
Status: DISCONTINUED | OUTPATIENT
Start: 2019-04-16 | End: 2019-04-16 | Stop reason: SDUPTHER

## 2019-04-16 RX ORDER — ERGOCALCIFEROL 1.25 MG/1
50000 CAPSULE ORAL WEEKLY
Qty: 12 CAPSULE | Refills: 0 | Status: SHIPPED | OUTPATIENT
Start: 2019-04-16 | End: 2019-10-22 | Stop reason: ALTCHOICE

## 2019-04-16 RX ORDER — ONDANSETRON 2 MG/ML
4 INJECTION INTRAMUSCULAR; INTRAVENOUS EVERY 6 HOURS PRN
Status: DISCONTINUED | OUTPATIENT
Start: 2019-04-16 | End: 2019-04-21 | Stop reason: HOSPADM

## 2019-04-16 RX ORDER — LIDOCAINE HYDROCHLORIDE 10 MG/ML
INJECTION, SOLUTION INFILTRATION; PERINEURAL PRN
Status: DISCONTINUED | OUTPATIENT
Start: 2019-04-16 | End: 2019-04-16 | Stop reason: SDUPTHER

## 2019-04-16 RX ORDER — DEXAMETHASONE SODIUM PHOSPHATE 4 MG/ML
INJECTION, SOLUTION INTRA-ARTICULAR; INTRALESIONAL; INTRAMUSCULAR; INTRAVENOUS; SOFT TISSUE PRN
Status: DISCONTINUED | OUTPATIENT
Start: 2019-04-16 | End: 2019-04-16 | Stop reason: SDUPTHER

## 2019-04-16 RX ORDER — OXYCODONE HYDROCHLORIDE AND ACETAMINOPHEN 5; 325 MG/1; MG/1
1 TABLET ORAL PRN
Status: DISCONTINUED | OUTPATIENT
Start: 2019-04-16 | End: 2019-04-16

## 2019-04-16 RX ORDER — ROCURONIUM BROMIDE 10 MG/ML
INJECTION, SOLUTION INTRAVENOUS PRN
Status: DISCONTINUED | OUTPATIENT
Start: 2019-04-16 | End: 2019-04-16 | Stop reason: SDUPTHER

## 2019-04-16 RX ORDER — HYDRALAZINE HYDROCHLORIDE 20 MG/ML
5 INJECTION INTRAMUSCULAR; INTRAVENOUS EVERY 10 MIN PRN
Status: DISCONTINUED | OUTPATIENT
Start: 2019-04-16 | End: 2019-04-16 | Stop reason: HOSPADM

## 2019-04-16 RX ORDER — PROMETHAZINE HYDROCHLORIDE 25 MG/ML
6.25 INJECTION, SOLUTION INTRAMUSCULAR; INTRAVENOUS
Status: DISCONTINUED | OUTPATIENT
Start: 2019-04-16 | End: 2019-04-16 | Stop reason: HOSPADM

## 2019-04-16 RX ORDER — DIPHENHYDRAMINE HYDROCHLORIDE 50 MG/ML
12.5 INJECTION INTRAMUSCULAR; INTRAVENOUS
Status: DISCONTINUED | OUTPATIENT
Start: 2019-04-16 | End: 2019-04-16 | Stop reason: HOSPADM

## 2019-04-16 RX ORDER — OXYCODONE HYDROCHLORIDE 5 MG/1
5 TABLET ORAL EVERY 4 HOURS PRN
Status: DISCONTINUED | OUTPATIENT
Start: 2019-04-16 | End: 2019-04-16

## 2019-04-16 RX ORDER — MAGNESIUM HYDROXIDE 1200 MG/15ML
LIQUID ORAL CONTINUOUS PRN
Status: COMPLETED | OUTPATIENT
Start: 2019-04-16 | End: 2019-04-16

## 2019-04-16 RX ORDER — OXYCODONE HYDROCHLORIDE AND ACETAMINOPHEN 5; 325 MG/1; MG/1
2 TABLET ORAL PRN
Status: DISCONTINUED | OUTPATIENT
Start: 2019-04-16 | End: 2019-04-16

## 2019-04-16 RX ORDER — SODIUM CHLORIDE, SODIUM LACTATE, POTASSIUM CHLORIDE, CALCIUM CHLORIDE 600; 310; 30; 20 MG/100ML; MG/100ML; MG/100ML; MG/100ML
INJECTION, SOLUTION INTRAVENOUS CONTINUOUS
Status: DISCONTINUED | OUTPATIENT
Start: 2019-04-16 | End: 2019-04-18

## 2019-04-16 RX ORDER — MIDAZOLAM HYDROCHLORIDE 1 MG/ML
INJECTION INTRAMUSCULAR; INTRAVENOUS
Status: COMPLETED
Start: 2019-04-16 | End: 2019-04-16

## 2019-04-16 RX ORDER — OXYCODONE HYDROCHLORIDE 5 MG/1
10 TABLET ORAL EVERY 4 HOURS PRN
Status: DISCONTINUED | OUTPATIENT
Start: 2019-04-16 | End: 2019-04-16

## 2019-04-16 RX ORDER — ACETAMINOPHEN 10 MG/ML
1000 INJECTION, SOLUTION INTRAVENOUS EVERY 6 HOURS
Status: DISCONTINUED | OUTPATIENT
Start: 2019-04-16 | End: 2019-04-18 | Stop reason: SDUPTHER

## 2019-04-16 RX ORDER — MIDAZOLAM HYDROCHLORIDE 1 MG/ML
1 INJECTION INTRAMUSCULAR; INTRAVENOUS PRN
Status: DISCONTINUED | OUTPATIENT
Start: 2019-04-16 | End: 2019-04-21 | Stop reason: HOSPADM

## 2019-04-16 RX ORDER — SODIUM CHLORIDE 0.9 % (FLUSH) 0.9 %
10 SYRINGE (ML) INJECTION PRN
Status: DISCONTINUED | OUTPATIENT
Start: 2019-04-16 | End: 2019-04-16

## 2019-04-16 RX ORDER — FENTANYL CITRATE 50 UG/ML
INJECTION, SOLUTION INTRAMUSCULAR; INTRAVENOUS PRN
Status: DISCONTINUED | OUTPATIENT
Start: 2019-04-16 | End: 2019-04-16 | Stop reason: SDUPTHER

## 2019-04-16 RX ORDER — NALOXONE HYDROCHLORIDE 0.4 MG/ML
0.4 INJECTION, SOLUTION INTRAMUSCULAR; INTRAVENOUS; SUBCUTANEOUS PRN
Status: DISCONTINUED | OUTPATIENT
Start: 2019-04-16 | End: 2019-04-19

## 2019-04-16 RX ADMIN — HYDROMORPHONE HYDROCHLORIDE 0.5 MG: 1 INJECTION, SOLUTION INTRAMUSCULAR; INTRAVENOUS; SUBCUTANEOUS at 14:25

## 2019-04-16 RX ADMIN — MORPHINE SULFATE 2 MG: 2 INJECTION, SOLUTION INTRAMUSCULAR; INTRAVENOUS at 14:41

## 2019-04-16 RX ADMIN — SODIUM CHLORIDE, POTASSIUM CHLORIDE, SODIUM LACTATE AND CALCIUM CHLORIDE: 600; 310; 30; 20 INJECTION, SOLUTION INTRAVENOUS at 08:07

## 2019-04-16 RX ADMIN — MORPHINE SULFATE 2 MG: 2 INJECTION, SOLUTION INTRAMUSCULAR; INTRAVENOUS at 15:23

## 2019-04-16 RX ADMIN — SUGAMMADEX 200 MG: 100 INJECTION, SOLUTION INTRAVENOUS at 12:33

## 2019-04-16 RX ADMIN — PIPERACILLIN SODIUM,TAZOBACTAM SODIUM 3.38 G: 3; .375 INJECTION, POWDER, FOR SOLUTION INTRAVENOUS at 07:49

## 2019-04-16 RX ADMIN — DEXAMETHASONE SODIUM PHOSPHATE 4 MG: 4 INJECTION, SOLUTION INTRAMUSCULAR; INTRAVENOUS at 07:42

## 2019-04-16 RX ADMIN — ROCURONIUM BROMIDE 15 MG: 10 SOLUTION INTRAVENOUS at 11:04

## 2019-04-16 RX ADMIN — ONDANSETRON 4 MG: 2 INJECTION INTRAMUSCULAR; INTRAVENOUS at 12:52

## 2019-04-16 RX ADMIN — HYDROMORPHONE HYDROCHLORIDE 0.5 MG: 2 INJECTION INTRAMUSCULAR; INTRAVENOUS; SUBCUTANEOUS at 08:42

## 2019-04-16 RX ADMIN — PROPOFOL 200 MG: 10 INJECTION, EMULSION INTRAVENOUS at 07:42

## 2019-04-16 RX ADMIN — ROCURONIUM BROMIDE 20 MG: 10 SOLUTION INTRAVENOUS at 08:37

## 2019-04-16 RX ADMIN — LIDOCAINE HYDROCHLORIDE 40 MG: 10 INJECTION, SOLUTION INFILTRATION; PERINEURAL at 07:42

## 2019-04-16 RX ADMIN — HYDROMORPHONE HYDROCHLORIDE 0.5 MG: 1 INJECTION, SOLUTION INTRAMUSCULAR; INTRAVENOUS; SUBCUTANEOUS at 13:09

## 2019-04-16 RX ADMIN — HYDROMORPHONE HYDROCHLORIDE 0.5 MG: 1 INJECTION, SOLUTION INTRAMUSCULAR; INTRAVENOUS; SUBCUTANEOUS at 12:56

## 2019-04-16 RX ADMIN — Medication 10 MG: at 16:12

## 2019-04-16 RX ADMIN — HYDROMORPHONE HYDROCHLORIDE 0.5 MG: 2 INJECTION INTRAMUSCULAR; INTRAVENOUS; SUBCUTANEOUS at 11:38

## 2019-04-16 RX ADMIN — SODIUM CHLORIDE, POTASSIUM CHLORIDE, SODIUM LACTATE AND CALCIUM CHLORIDE: 600; 310; 30; 20 INJECTION, SOLUTION INTRAVENOUS at 21:22

## 2019-04-16 RX ADMIN — ONDANSETRON 4 MG: 2 INJECTION INTRAMUSCULAR; INTRAVENOUS at 07:42

## 2019-04-16 RX ADMIN — FENTANYL CITRATE 100 MCG: 50 INJECTION INTRAMUSCULAR; INTRAVENOUS at 07:42

## 2019-04-16 RX ADMIN — ROCURONIUM BROMIDE 50 MG: 10 SOLUTION INTRAVENOUS at 07:42

## 2019-04-16 RX ADMIN — ROCURONIUM BROMIDE 15 MG: 10 SOLUTION INTRAVENOUS at 11:30

## 2019-04-16 RX ADMIN — MORPHINE SULFATE 2 MG: 2 INJECTION, SOLUTION INTRAMUSCULAR; INTRAVENOUS at 13:22

## 2019-04-16 RX ADMIN — ACETAMINOPHEN 1000 MG: 10 INJECTION, SOLUTION INTRAVENOUS at 19:57

## 2019-04-16 RX ADMIN — MORPHINE SULFATE 2 MG: 2 INJECTION, SOLUTION INTRAMUSCULAR; INTRAVENOUS at 13:16

## 2019-04-16 RX ADMIN — HYDROMORPHONE HYDROCHLORIDE 0.5 MG: 2 INJECTION INTRAMUSCULAR; INTRAVENOUS; SUBCUTANEOUS at 11:13

## 2019-04-16 RX ADMIN — ACETAMINOPHEN 1000 MG: 10 INJECTION, SOLUTION INTRAVENOUS at 13:59

## 2019-04-16 RX ADMIN — HYDROMORPHONE HYDROCHLORIDE 0.5 MG: 2 INJECTION INTRAMUSCULAR; INTRAVENOUS; SUBCUTANEOUS at 08:12

## 2019-04-16 RX ADMIN — MIDAZOLAM 2 MG: 1 INJECTION INTRAMUSCULAR; INTRAVENOUS at 07:24

## 2019-04-16 RX ADMIN — MIDAZOLAM HYDROCHLORIDE 1 MG: 1 INJECTION INTRAMUSCULAR; INTRAVENOUS at 13:41

## 2019-04-16 RX ADMIN — SODIUM CHLORIDE, POTASSIUM CHLORIDE, SODIUM LACTATE AND CALCIUM CHLORIDE: 600; 310; 30; 20 INJECTION, SOLUTION INTRAVENOUS at 12:14

## 2019-04-16 RX ADMIN — SODIUM CHLORIDE, POTASSIUM CHLORIDE, SODIUM LACTATE AND CALCIUM CHLORIDE: 600; 310; 30; 20 INJECTION, SOLUTION INTRAVENOUS at 07:16

## 2019-04-16 RX ADMIN — HYDROMORPHONE HYDROCHLORIDE 0.5 MG: 1 INJECTION, SOLUTION INTRAMUSCULAR; INTRAVENOUS; SUBCUTANEOUS at 12:48

## 2019-04-16 RX ADMIN — MIDAZOLAM HYDROCHLORIDE 2 MG: 1 INJECTION INTRAMUSCULAR; INTRAVENOUS at 07:24

## 2019-04-16 ASSESSMENT — PULMONARY FUNCTION TESTS
PIF_VALUE: 33
PIF_VALUE: 17
PIF_VALUE: 14
PIF_VALUE: 33
PIF_VALUE: 34
PIF_VALUE: 27
PIF_VALUE: 14
PIF_VALUE: 33
PIF_VALUE: 16
PIF_VALUE: 18
PIF_VALUE: 20
PIF_VALUE: 29
PIF_VALUE: 13
PIF_VALUE: 33
PIF_VALUE: 17
PIF_VALUE: 19
PIF_VALUE: 33
PIF_VALUE: 1
PIF_VALUE: 17
PIF_VALUE: 32
PIF_VALUE: 32
PIF_VALUE: 33
PIF_VALUE: 17
PIF_VALUE: 16
PIF_VALUE: 20
PIF_VALUE: 33
PIF_VALUE: 19
PIF_VALUE: 26
PIF_VALUE: 1
PIF_VALUE: 19
PIF_VALUE: 33
PIF_VALUE: 33
PIF_VALUE: 19
PIF_VALUE: 33
PIF_VALUE: 32
PIF_VALUE: 13
PIF_VALUE: 32
PIF_VALUE: 33
PIF_VALUE: 32
PIF_VALUE: 33
PIF_VALUE: 17
PIF_VALUE: 33
PIF_VALUE: 29
PIF_VALUE: 31
PIF_VALUE: 32
PIF_VALUE: 15
PIF_VALUE: 19
PIF_VALUE: 14
PIF_VALUE: 34
PIF_VALUE: 20
PIF_VALUE: 17
PIF_VALUE: 32
PIF_VALUE: 0
PIF_VALUE: 33
PIF_VALUE: 20
PIF_VALUE: 1
PIF_VALUE: 33
PIF_VALUE: 16
PIF_VALUE: 33
PIF_VALUE: 16
PIF_VALUE: 33
PIF_VALUE: 33
PIF_VALUE: 18
PIF_VALUE: 18
PIF_VALUE: 33
PIF_VALUE: 33
PIF_VALUE: 34
PIF_VALUE: 0
PIF_VALUE: 19
PIF_VALUE: 16
PIF_VALUE: 33
PIF_VALUE: 19
PIF_VALUE: 16
PIF_VALUE: 16
PIF_VALUE: 18
PIF_VALUE: 33
PIF_VALUE: 33
PIF_VALUE: 14
PIF_VALUE: 23
PIF_VALUE: 32
PIF_VALUE: 16
PIF_VALUE: 30
PIF_VALUE: 26
PIF_VALUE: 19
PIF_VALUE: 13
PIF_VALUE: 18
PIF_VALUE: 32
PIF_VALUE: 32
PIF_VALUE: 19
PIF_VALUE: 32
PIF_VALUE: 32
PIF_VALUE: 28
PIF_VALUE: 15
PIF_VALUE: 33
PIF_VALUE: 0
PIF_VALUE: 32
PIF_VALUE: 27
PIF_VALUE: 33
PIF_VALUE: 33
PIF_VALUE: 13
PIF_VALUE: 16
PIF_VALUE: 15
PIF_VALUE: 19
PIF_VALUE: 15
PIF_VALUE: 33
PIF_VALUE: 32
PIF_VALUE: 30
PIF_VALUE: 28
PIF_VALUE: 26
PIF_VALUE: 33
PIF_VALUE: 33
PIF_VALUE: 20
PIF_VALUE: 20
PIF_VALUE: 19
PIF_VALUE: 33
PIF_VALUE: 20
PIF_VALUE: 34
PIF_VALUE: 32
PIF_VALUE: 20
PIF_VALUE: 34
PIF_VALUE: 18
PIF_VALUE: 32
PIF_VALUE: 32
PIF_VALUE: 26
PIF_VALUE: 19
PIF_VALUE: 20
PIF_VALUE: 16
PIF_VALUE: 34
PIF_VALUE: 32
PIF_VALUE: 19
PIF_VALUE: 29
PIF_VALUE: 16
PIF_VALUE: 19
PIF_VALUE: 19
PIF_VALUE: 15
PIF_VALUE: 33
PIF_VALUE: 32
PIF_VALUE: 33
PIF_VALUE: 19
PIF_VALUE: 33
PIF_VALUE: 16
PIF_VALUE: 33
PIF_VALUE: 19
PIF_VALUE: 33
PIF_VALUE: 32
PIF_VALUE: 27
PIF_VALUE: 18
PIF_VALUE: 33
PIF_VALUE: 33
PIF_VALUE: 32
PIF_VALUE: 33
PIF_VALUE: 19
PIF_VALUE: 33
PIF_VALUE: 32
PIF_VALUE: 15
PIF_VALUE: 19
PIF_VALUE: 19
PIF_VALUE: 32
PIF_VALUE: 18
PIF_VALUE: 33
PIF_VALUE: 13
PIF_VALUE: 32
PIF_VALUE: 33
PIF_VALUE: 14
PIF_VALUE: 16
PIF_VALUE: 18
PIF_VALUE: 33
PIF_VALUE: 14
PIF_VALUE: 13
PIF_VALUE: 33
PIF_VALUE: 14
PIF_VALUE: 32
PIF_VALUE: 32
PIF_VALUE: 33
PIF_VALUE: 18
PIF_VALUE: 33
PIF_VALUE: 32
PIF_VALUE: 19
PIF_VALUE: 18
PIF_VALUE: 34
PIF_VALUE: 26
PIF_VALUE: 0
PIF_VALUE: 13
PIF_VALUE: 32
PIF_VALUE: 33
PIF_VALUE: 0
PIF_VALUE: 20
PIF_VALUE: 33
PIF_VALUE: 19
PIF_VALUE: 32
PIF_VALUE: 16
PIF_VALUE: 11
PIF_VALUE: 33
PIF_VALUE: 33
PIF_VALUE: 30
PIF_VALUE: 33
PIF_VALUE: 33
PIF_VALUE: 16
PIF_VALUE: 30
PIF_VALUE: 33
PIF_VALUE: 18
PIF_VALUE: 14
PIF_VALUE: 19
PIF_VALUE: 32
PIF_VALUE: 13
PIF_VALUE: 15
PIF_VALUE: 33
PIF_VALUE: 15
PIF_VALUE: 32
PIF_VALUE: 18
PIF_VALUE: 32
PIF_VALUE: 16
PIF_VALUE: 32
PIF_VALUE: 20
PIF_VALUE: 16
PIF_VALUE: 1
PIF_VALUE: 19
PIF_VALUE: 30
PIF_VALUE: 33
PIF_VALUE: 17
PIF_VALUE: 14
PIF_VALUE: 17
PIF_VALUE: 29
PIF_VALUE: 0
PIF_VALUE: 30
PIF_VALUE: 32
PIF_VALUE: 33
PIF_VALUE: 19
PIF_VALUE: 13
PIF_VALUE: 24
PIF_VALUE: 33
PIF_VALUE: 31
PIF_VALUE: 32
PIF_VALUE: 32
PIF_VALUE: 31
PIF_VALUE: 24
PIF_VALUE: 13
PIF_VALUE: 32
PIF_VALUE: 33
PIF_VALUE: 33
PIF_VALUE: 32
PIF_VALUE: 32
PIF_VALUE: 15
PIF_VALUE: 32
PIF_VALUE: 20
PIF_VALUE: 33
PIF_VALUE: 32
PIF_VALUE: 17
PIF_VALUE: 19
PIF_VALUE: 16
PIF_VALUE: 33
PIF_VALUE: 33
PIF_VALUE: 32
PIF_VALUE: 19
PIF_VALUE: 17
PIF_VALUE: 32
PIF_VALUE: 33
PIF_VALUE: 33
PIF_VALUE: 32
PIF_VALUE: 19
PIF_VALUE: 33
PIF_VALUE: 34
PIF_VALUE: 19
PIF_VALUE: 32
PIF_VALUE: 33
PIF_VALUE: 33
PIF_VALUE: 17
PIF_VALUE: 19
PIF_VALUE: 15
PIF_VALUE: 33
PIF_VALUE: 18
PIF_VALUE: 18
PIF_VALUE: 32
PIF_VALUE: 32
PIF_VALUE: 33
PIF_VALUE: 17
PIF_VALUE: 33
PIF_VALUE: 19
PIF_VALUE: 33
PIF_VALUE: 0
PIF_VALUE: 33
PIF_VALUE: 14
PIF_VALUE: 15
PIF_VALUE: 21
PIF_VALUE: 17

## 2019-04-16 ASSESSMENT — PAIN DESCRIPTION - ONSET
ONSET: ON-GOING
ONSET: ON-GOING

## 2019-04-16 ASSESSMENT — PAIN SCALES - GENERAL
PAINLEVEL_OUTOF10: 8
PAINLEVEL_OUTOF10: 10
PAINLEVEL_OUTOF10: 10
PAINLEVEL_OUTOF10: 1
PAINLEVEL_OUTOF10: 10
PAINLEVEL_OUTOF10: 6
PAINLEVEL_OUTOF10: 9
PAINLEVEL_OUTOF10: 8
PAINLEVEL_OUTOF10: 9
PAINLEVEL_OUTOF10: 10
PAINLEVEL_OUTOF10: 7
PAINLEVEL_OUTOF10: 5

## 2019-04-16 ASSESSMENT — PAIN DESCRIPTION - FREQUENCY
FREQUENCY: CONTINUOUS

## 2019-04-16 ASSESSMENT — PAIN DESCRIPTION - LOCATION
LOCATION: ABDOMEN

## 2019-04-16 ASSESSMENT — PAIN - FUNCTIONAL ASSESSMENT
PAIN_FUNCTIONAL_ASSESSMENT: 0-10
PAIN_FUNCTIONAL_ASSESSMENT: PREVENTS OR INTERFERES WITH ALL ACTIVE AND SOME PASSIVE ACTIVITIES

## 2019-04-16 ASSESSMENT — PAIN DESCRIPTION - DESCRIPTORS
DESCRIPTORS: SHARP
DESCRIPTORS: DISCOMFORT
DESCRIPTORS: ACHING;CONSTANT
DESCRIPTORS: SHARP

## 2019-04-16 ASSESSMENT — PAIN DESCRIPTION - PAIN TYPE
TYPE: ACUTE PAIN;SURGICAL PAIN
TYPE: SURGICAL PAIN
TYPE: ACUTE PAIN;SURGICAL PAIN

## 2019-04-16 ASSESSMENT — PAIN DESCRIPTION - ORIENTATION
ORIENTATION: MID
ORIENTATION: LOWER;MID
ORIENTATION: LOWER;MID
ORIENTATION: MID

## 2019-04-16 ASSESSMENT — PAIN DESCRIPTION - PROGRESSION: CLINICAL_PROGRESSION: GRADUALLY WORSENING

## 2019-04-16 NOTE — H&P
I have reviewed the history and physical and examined the patient. I find no relevant changes. I have reviewed with the patient and/or family members, during the preoperative office visit the risks, benefits, and alternatives to the procedure.     Monse Peterson

## 2019-04-16 NOTE — ANESTHESIA POSTPROCEDURE EVALUATION
Department of Anesthesiology  Postprocedure Note    Patient: Koko Amador  MRN: [id number]  YOB: 1965  Date of evaluation: 4/16/2019  Time:  6:02 PM     Procedure Summary     Date:  04/16/19 Room / Location:  [facility] [place] OR 02 / [facility] [place] OR    Anesthesia Start:  3167 Anesthesia Stop:  1240    Procedure:  ROBOTIC LAPAROSCOPIC RIGHT COLECTOMY CPT CODE - 12707 (Right Abdomen) Diagnosis:       Carcinoma of ascending colon ([facility] [address]      (COLON CARCINOMA, ASCENDING)    Surgeon:  Brandie aCrter MD Responsible Provider:  Bertram Betts MD    Anesthesia Type:  general ASA Status:  2          Anesthesia Type: general    Wendy Phase I: Wendy Score: 9    Wendy Phase II:      Last vitals: Reviewed and per EMR flowsheets.        Anesthesia Post Evaluation    Comments: Postoperative Anesthesia Note    Name:    Koko Amador  MRN:      [id number]    Patient Vitals in the past 12 hrs:  04/16/19 1708, Pulse:99, SpO2:96 %  04/16/19 1703, BP:(!) 144/82, Temp:97.7 °F (36.5 °C), Temp src:Oral, Pulse:97, Resp:18, SpO2:97 %  04/16/19 1452, SpO2:97 %  04/16/19 1435, BP:(!) 153/80, Temp:97 °F (36.1 °C), Temp src:Temporal, Pulse:96, Resp:13, SpO2:98 %  04/16/19 1430, Pulse:97, Resp:21, SpO2:98 %  04/16/19 1425, BP:137/81, Pulse:91, Resp:13, SpO2:98 %  04/16/19 1420, Pulse:95, Resp:16, SpO2:99 %  04/16/19 1415, Pulse:96, Resp:17, SpO2:98 %  04/16/19 1410, BP:(!) 147/82, Pulse:101, Resp:18, SpO2:96 %  04/16/19 1405, Pulse:99, Resp:15, SpO2:97 %  04/16/19 1400, Pulse:102, Resp:20, SpO2:97 %  04/16/19 1355, BP:(!) 144/79, Pulse:97, Resp:15, SpO2:97 %  04/16/19 1350, Pulse:94, Resp:(!) 7, SpO2:98 %  04/16/19 1345, Pulse:101, Resp:20, SpO2:98 %  04/16/19 1343, BP:136/81, Temp:98.1 °F (36.7 °C), Temp src:Temporal, Pulse:99, Resp:18, SpO2:98 %  04/16/19 1340, BP:136/81, Pulse:91, Resp:(!) 3, SpO2:94 %  04/16/19 1335, BP:(!) 141/82, Pulse:92, Resp:13, SpO2:93 %  04/16/19 1330, BP:(!) 144/78, Pulse:99, Resp:17, SpO2:96 %  04/16/19

## 2019-04-16 NOTE — PROGRESS NOTES
Patient remained in Pacu for extended period of time due to inability to control pain. Multiple avenues of pain relief were implemented until we reached tolerable level. It was explained to patient and patient's wife that total painfree would be unlikely but we were striving for tolerable.   Patient expressed understanding

## 2019-04-16 NOTE — PROGRESS NOTES
Dr. Rothman Fell notifed of pt's anxiety. New orders given.  Pt awaiting results of blood work prior to going into surgery

## 2019-04-16 NOTE — BRIEF OP NOTE
Brief Postoperative Note  ______________________________________________________________    Patient: Campbell Gamez  YOB: 1965  MRN: [id number]  Date of Procedure: 4/16/2019    Pre-Op Diagnosis: COLON CARCINOMA, ASCENDING    Post-Op Diagnosis: Same       Procedure(s):  ROBOTIC LAPAROSCOPIC RIGHT COLECTOMY CPT CODE - 45442    Anesthesia: General    Surgeon(s):  Chrissie Osborn MD    Assistant: Angela Calhoun    Estimated Blood Loss (mL): 158     Complications: None    Specimens:   ID Type Source Tests Collected by Time Destination   A : RIGHT COLON Tissue Tissue SURGICAL PATHOLOGY Chrissie Osborn MD 4/16/2019 1111          Drains:   Urethral Catheter 16 fr (Active)       Findings: large ascending colon mass    Angela Calhoun MD  Date: 4/16/2019  Time: 12:23 PM

## 2019-04-17 LAB
ANION GAP SERPL CALCULATED.3IONS-SCNC: 10 MMOL/L (ref 3–16)
BASOPHILS ABSOLUTE: 0 K/UL (ref 0–0.2)
BASOPHILS RELATIVE PERCENT: 0.6 %
BUN BLDV-MCNC: 5 MG/DL (ref 7–20)
CALCIUM SERPL-MCNC: 8.2 MG/DL (ref 8.3–10.6)
CHLORIDE BLD-SCNC: 101 MMOL/L (ref 99–110)
CO2: 27 MMOL/L (ref 21–32)
CREAT SERPL-MCNC: 0.7 MG/DL (ref 0.9–1.3)
EOSINOPHILS ABSOLUTE: 0.1 K/UL (ref 0–0.6)
EOSINOPHILS RELATIVE PERCENT: 0.8 %
GFR AFRICAN AMERICAN: >60
GFR NON-AFRICAN AMERICAN: >60
GLUCOSE BLD-MCNC: 95 MG/DL (ref 70–99)
HCT VFR BLD CALC: 31.1 % (ref 40.5–52.5)
HEMATOLOGY PATH CONSULT: NO
HEMOGLOBIN: 9.1 G/DL (ref 13.5–17.5)
LYMPHOCYTES ABSOLUTE: 0.7 K/UL (ref 1–5.1)
LYMPHOCYTES RELATIVE PERCENT: 11.5 %
MAGNESIUM: 2 MG/DL (ref 1.8–2.4)
MCH RBC QN AUTO: 20.2 PG (ref 26–34)
MCHC RBC AUTO-ENTMCNC: 29.4 G/DL (ref 31–36)
MCV RBC AUTO: 69 FL (ref 80–100)
MONOCYTES ABSOLUTE: 0.5 K/UL (ref 0–1.3)
MONOCYTES RELATIVE PERCENT: 8.7 %
NEUTROPHILS ABSOLUTE: 4.8 K/UL (ref 1.7–7.7)
NEUTROPHILS RELATIVE PERCENT: 78.4 %
PDW BLD-RTO: 38.5 % (ref 12.4–15.4)
PHOSPHORUS: 2.1 MG/DL (ref 2.5–4.9)
PLATELET # BLD: 308 K/UL (ref 135–450)
PMV BLD AUTO: 8.3 FL (ref 5–10.5)
POTASSIUM REFLEX MAGNESIUM: 3.6 MMOL/L (ref 3.5–5.1)
RBC # BLD: 4.51 M/UL (ref 4.2–5.9)
SODIUM BLD-SCNC: 138 MMOL/L (ref 136–145)
WBC # BLD: 6.2 K/UL (ref 4–11)

## 2019-04-17 PROCEDURE — 94761 N-INVAS EAR/PLS OXIMETRY MLT: CPT

## 2019-04-17 PROCEDURE — 83735 ASSAY OF MAGNESIUM: CPT

## 2019-04-17 PROCEDURE — 2580000003 HC RX 258: Performed by: STUDENT IN AN ORGANIZED HEALTH CARE EDUCATION/TRAINING PROGRAM

## 2019-04-17 PROCEDURE — 2580000003 HC RX 258

## 2019-04-17 PROCEDURE — 85025 COMPLETE CBC W/AUTO DIFF WBC: CPT

## 2019-04-17 PROCEDURE — 36415 COLL VENOUS BLD VENIPUNCTURE: CPT

## 2019-04-17 PROCEDURE — 2700000000 HC OXYGEN THERAPY PER DAY

## 2019-04-17 PROCEDURE — 6360000002 HC RX W HCPCS: Performed by: SURGERY

## 2019-04-17 PROCEDURE — 6360000002 HC RX W HCPCS: Performed by: STUDENT IN AN ORGANIZED HEALTH CARE EDUCATION/TRAINING PROGRAM

## 2019-04-17 PROCEDURE — 2500000003 HC RX 250 WO HCPCS: Performed by: SURGERY

## 2019-04-17 PROCEDURE — 84100 ASSAY OF PHOSPHORUS: CPT

## 2019-04-17 PROCEDURE — 94770 HC ETCO2 MONITOR DAILY: CPT

## 2019-04-17 PROCEDURE — 80048 BASIC METABOLIC PNL TOTAL CA: CPT

## 2019-04-17 PROCEDURE — 2580000003 HC RX 258: Performed by: SURGERY

## 2019-04-17 PROCEDURE — 99024 POSTOP FOLLOW-UP VISIT: CPT | Performed by: SURGERY

## 2019-04-17 PROCEDURE — 1200000000 HC SEMI PRIVATE

## 2019-04-17 PROCEDURE — APPSS60 APP SPLIT SHARED TIME 46-60 MINUTES: Performed by: CLINICAL NURSE SPECIALIST

## 2019-04-17 PROCEDURE — 2500000003 HC RX 250 WO HCPCS: Performed by: CLINICAL NURSE SPECIALIST

## 2019-04-17 RX ORDER — DIPHENHYDRAMINE HYDROCHLORIDE 50 MG/ML
6.25 INJECTION INTRAMUSCULAR; INTRAVENOUS EVERY 6 HOURS PRN
Status: DISPENSED | OUTPATIENT
Start: 2019-04-17 | End: 2019-04-19

## 2019-04-17 RX ORDER — SODIUM CHLORIDE 9 MG/ML
INJECTION, SOLUTION INTRAVENOUS
Status: COMPLETED
Start: 2019-04-17 | End: 2019-04-17

## 2019-04-17 RX ADMIN — FAMOTIDINE 20 MG: 10 INJECTION, SOLUTION INTRAVENOUS at 12:14

## 2019-04-17 RX ADMIN — ACETAMINOPHEN 1000 MG: 10 INJECTION, SOLUTION INTRAVENOUS at 14:04

## 2019-04-17 RX ADMIN — ACETAMINOPHEN 1000 MG: 10 INJECTION, SOLUTION INTRAVENOUS at 06:36

## 2019-04-17 RX ADMIN — Medication: at 00:36

## 2019-04-17 RX ADMIN — Medication 10 ML: at 20:46

## 2019-04-17 RX ADMIN — POTASSIUM PHOSPHATE, MONOBASIC AND POTASSIUM PHOSPHATE, DIBASIC 10 MMOL: 224; 236 INJECTION, SOLUTION, CONCENTRATE INTRAVENOUS at 11:00

## 2019-04-17 RX ADMIN — ENOXAPARIN SODIUM 40 MG: 40 INJECTION SUBCUTANEOUS at 08:30

## 2019-04-17 RX ADMIN — SODIUM CHLORIDE, POTASSIUM CHLORIDE, SODIUM LACTATE AND CALCIUM CHLORIDE: 600; 310; 30; 20 INJECTION, SOLUTION INTRAVENOUS at 22:04

## 2019-04-17 RX ADMIN — SODIUM CHLORIDE 250 ML: 9 INJECTION, SOLUTION INTRAVENOUS at 11:00

## 2019-04-17 RX ADMIN — ACETAMINOPHEN 1000 MG: 10 INJECTION, SOLUTION INTRAVENOUS at 22:04

## 2019-04-17 RX ADMIN — DIPHENHYDRAMINE HYDROCHLORIDE 6.25 MG: 50 INJECTION, SOLUTION INTRAMUSCULAR; INTRAVENOUS at 20:46

## 2019-04-17 RX ADMIN — Medication: at 12:14

## 2019-04-17 RX ADMIN — FAMOTIDINE 20 MG: 10 INJECTION, SOLUTION INTRAVENOUS at 20:46

## 2019-04-17 RX ADMIN — SODIUM CHLORIDE, POTASSIUM CHLORIDE, SODIUM LACTATE AND CALCIUM CHLORIDE: 600; 310; 30; 20 INJECTION, SOLUTION INTRAVENOUS at 07:29

## 2019-04-17 ASSESSMENT — PAIN SCALES - GENERAL
PAINLEVEL_OUTOF10: 10
PAINLEVEL_OUTOF10: 10

## 2019-04-17 NOTE — CARE COORDINATION
CASE MANAGEMENT INITIAL ASSESSMENT      Reviewed chart and met with patient today, re: 48 y.o. Male diagnosis  Explained Case Management role/services. Family present: none  Primary contact information: Kisohr Murphy    Admit date/status: 4/16/19  Diagnosis: colon cancer    Insurance: [facility]  Precert required for SNF - Y    3 night stay required - N    Living arrangements, Adls, care needs, prior to admission: lives in apartment 2nd floor with wife and kids    Transportation: likely private    [address] [employer] at home: Walker__Cane__RTS__ BSC__Shower Chair__  02__ HHN__ CPAP__  BiPap__  Hospital Bed__ W/C___ Other__________    Services in the home and/or outpatient, prior to admission: none      PT/OT recs: none    Hospital Exemption Notification (HEN): needed for SNF    Barriers to discharge: none    Plan/comments: Patient plans on returning home at discharge. Reports he is normally independent in ADL's and drives. Will be able to get to follow up appointments. Would like to speak to finance. States unable to pay bills as is. Would like to see if can get assistance with this bill or even apply for medicaid since will likely have massive bills in the very near future. Bernadette Ramirez messaged to see patient.  Georgina Villa RN      [facility] on chart for MD signature

## 2019-04-17 NOTE — PROGRESS NOTES
[id number]                 Collected:     04/16/2019  Med Rec No:    [id number]                Received:      04/16/2019  Attend Phys:   Mic Tellez MD       Completed:     04/17/2019  Perform Phys: Mic Tellez MD          Technical processing at [facility], [address] [place] [address]  Phone [phone]    FINAL DIAGNOSIS:    A Right colon, hemicolectomy:     - Invasive well moderately differentiated adenocarcinoma of the right       colon, involving the lamina propria and invading into the muscularis       propria, 9 cm in greatest gross dimension     - Twenty two lymph nodes negative for carcinoma (0/22)     - Two separate tubular adenomas     - Margins are negative for adenoma and carcinoma     - Benign appendix  COMMENT:   COLON AND RECTUM: Resection, Including Transanal Disk Excision  of Rectal Neoplasms    Procedure: Right hemicolectomy  Tumor Site: Right (ascending) colon  Tumor Size: 9 cm in greatest dimension  Macroscopic Tumor Perforation: Not identified  Histologic Type: Adenocarcinoma  Histologic Grade: G1: Well differentiated to G2: Moderately  differentiated  Tumor Extension: Tumor invades submucosa with involvement of the  muscularis propria  Margins:  All margins are uninvolved by invasive carcinoma, high-grade  dysplasia, intramucosal adenocarcinoma, and adenoma  Margins examined: distal, proximal, mesenteric (tumor lies 1.3 cm from  mesenteric edge)  Treatment Effect: No known presurgical therapy  Lymphovascular Invasion: Not identified  Perineural Invasion: Not identified  Tumor Deposits: Not identified  Regional Lymph Nodes  Number of Lymph Nodes Involved: 0  Number of Lymph Nodes Examined: 22  Pathologic Stage Classification (pTNM, AJCC 8th Edition)  Primary Tumor (pT)  pT2: Tumor invades the muscularis propria  Regional Lymph Nodes (pN)  pN0: No regional lymph node metastasis  Additional Pathologic Findings: Tubular adenomas        HANDI/HANDI

## 2019-04-17 NOTE — PLAN OF CARE
Problem: Pain:  Description  Pain management should include both nonpharmacologic and pharmacologic interventions. Goal: Pain level will decrease  Description  Pain level will decrease  Outcome: Ongoing  Note:   Pt reporting pain 6 out of 10 on the pain scale. PCA pump controlling patient's pain well. Will continue to monitor and reassess pain.

## 2019-04-17 NOTE — PROGRESS NOTES
Shift assessment completed. Pt A&O, VSS. Midline incision and x4 lap incisions all C/D/I with surgical glue. Wells catheter draining clear, yellow urine. PCA pump controlling patient's pain well. Bowel sounds are hypoactive. Instructed pt he needs to get up to chait today. Denies any needs currently at this time. Bed locked and in lowest position. Call light within reach. Will continue to monitor.

## 2019-04-17 NOTE — FLOWSHEET NOTE
Spiritual Care     Provided supportive visit and care due to patient's high score on his distress screen. Offered listening care and presence. Reached out to his  for support at the patient's request.  Discussed support resources available to him in and out of the hospital.  Patient has young children and has concern for them. His family is a great resource in helping him attend to his children as he is recovering from his colon surgery. Discussed with him the resource of [facility] [place], if needed or desired. Spiritual Care is available for further support as needed. Ivan Palm         Emotional Concerns  o Dr. Walt Cai, Oncology Psychologist, schedule through [facility] [address], [phone]         04/17/19 1206   Encounter Summary   Services provided to: Patient and family together   Referral/Consult From: [facility] Family members; Hindu/serene community   Place of [address] [facility] Yes   Continue Visiting   (4/17 Provided support due to high distress screen score)   Complexity of Encounter High   Length of Encounter 45 minutes   Spiritual Assessment Completed Yes   Sacraments   Sacrament of Sick-Anointing Patient requested anointing  (Contacted  at request of patient)   Grief and Life Adjustment   Type Adjustment to illness   Assessment Approachable; Anxious   Intervention Active listening;Explored coping resources;Nurtured hope;Sustaining presence/ Ministry of presence;Contacted support as requested per patient/family request   Who? Heber Taylor   Why?  For support   At Request Of patient   Outcome Connection/belonging;Comfort;Expressed gratitude

## 2019-04-17 NOTE — PROGRESS NOTES
Patient assessment complete and documented. VSS. A&O x4. Patient with complaints of pain at abd incision sites, patient using Dilaudid PCA pump for pain. Patient has x4 lap incisions and x1 midline, open to air closed with glue. Patients kovacs draining appropriately. Denies any further needs at this time. Bed in lowest locked position with call light within reach. Will continue to monitor.

## 2019-04-17 NOTE — PROGRESS NOTES
Wasted 13 mL of PCA Dilaudid with Shilpi ARANDA.      Electronically signed by Joan Hodgson RN on 4/17/2019 at 12:45 AM   Electronically signed by Talha Chaves RN on 4/17/2019 at 12:45 AM

## 2019-04-17 NOTE — PROGRESS NOTES
04/17/19 1555   Oxygen Therapy/Pulse Ox   O2 Therapy Oxygen   O2 Device Nasal cannula   O2 Flow Rate (L/min) 1 L/min   Resp 23   SpO2 95 %   $End Tidal CO2 37

## 2019-04-17 NOTE — OP NOTE
Operating Note    Patient: Heidy Cowan  YOB: 1965  MRN: [id number]  Date of Procedure: 4/16/2019     Pre-Op Diagnosis: COLON CARCINOMA, ASCENDING  Post-Op Diagnosis: Same       Procedure(s): ROBOTIC LAPAROSCOPIC RIGHT COLECTOMY CPT CODE - 68265  Anesthesia: General    Surgeon(s):Bobby Pham MD  Assistant: Saurabh Tadeo     Estimated Blood Loss (mL): 190   Complications: None     Specimens:   ID Type Source Tests Collected by Time Destination   A : RIGHT COLON Tissue Tissue SURGICAL PATHOLOGY May Gupta MD 4/16/2019 1111          Indications for procedure:  47 yo male presented with ascending colon cancer. Details of procedure: Patient was brought to the operating room after informed consent was obtained and placed on the operating room table in supine position. A state of general anesthesia was induced. Patient was prepped and draped in standard sterile fashion. Time out procedure was performed per institution protocol. A skin incision was made in the midline supraumbilical and and entry to the abdomen was done per Optiview technique (5 mm port). The abdomen was insufflated and there were no injuries from entry. Additional ports were placed in the RLQ (8mm) , LUQ (12mm), and one more trocar to the left and superior to the LUQ port (8m). The supraumbilical port was upsized to 8 mm for the robotic camera. An assitant 5mm trocar was also placed in the left side. The patient was positioned in Trendelenburg position and right side up. We then docked the robot in standard fashion. The abdomen was examined for metastatic disease and none was found. There were adhesions from the cecum and ascending colon to the anterior abdominal wall. The cecum, TI, and the ascending colon mass were identified and we proceeded with medial to lateral approach. The ileocolic pedicle was identified.   The peritoneum was incised with electrocautery just under the pedicle. The vessels were dissected out  Circumferentially from the surrounding fatty tissues. High ligation of the pedicle was performed with the robotic vessel sealer. The medial to lateral dissection was then continued superior, laterally and towards the liver. This was carried out with blunt dissection and vessel sealer. During this dissection the duodenum was identified and carefully preserved throughout the operation. A window was created just below the hepatic flexure. This window was enlarged to free up the hepatic flexure. Next, the ascending colon was taken down in a lateral to medial fashion, starting from hepatic flexure and all the way down to the cecum. The terminal ileum was identified and its mesentery was transected, thus creating enough mobilized small bowel for a tension free anastomosis. ~ 6 cm vertical incision was made to include the supra-pubic port site. The incision was deepened through the fascia and a medium wound protector was placed into the incision. The cecum, TI, and ascending colon (including the mass) were eviscerated. The mass was large and mass edges were clearly palpable. Transsection sites were chosen on either side of the mass to include at least 5 cm of margin on either side. The specimen was removed using linear BALAJI stapler with blue loads on either side and it was passed off to pathology department for review. Next, the terminal ileum and ascending colon were brought together without tension and anastomosis was performed in a side-to-side (functional end-to-end) fashion using 2 more loads of linear BALAJI stapler in a standard manner. The anastomosis was oversewn using 3-0 silk sutures with Marshel Juan Manuel stiches. The mesenteric defect was closed with more 3-0 silks figure of eight stiches. The anastomosis was noted to be widely patent, hemostatic, and without twisting. The bowel was then returned into the abdomen.   The abdominal cavity was irrigated with copious amounts of warm sterile saline. The fascia over the specimen extraction site was closed with looped 0 PDS. The wound was irrigated with sterile saline and hemostasis was ensured with electrocautery. The 12mm site fascia was closed with 0-0 vicryl suture. The skin at all sites was closed with 3-0 Vicryl deep dermal stiches and a 4-0 running Monocryl. Skin glue was applied as sterile dressing. Laps and instruments counts were correct x 2. Patient tolerated procedure well. There were no immediate complications. Dr. Beryle Gutter was present, scrubbed, and directed the course of the entire operation from beginning to end.        Marian Mckeon MD PGY 4  General Surgery Resident

## 2019-04-17 NOTE — DISCHARGE INSTR - COC
Continuity of Care Form    Patient Name: Javier Oliver   :  1965  MRN:  [id number]    Admit date:  2019  Discharge date:  ***    Code Status Order: Full Code   Advance Directives:   Advance Care Flowsheet Documentation     Date/Time Healthcare Directive Type of Healthcare Directive Copy in [address] Agent's Name Healthcare Agent's Phone Number    19 1815  Yes, patient has an advance directive for healthcare treatment  Living will;Durable power of  for health care  No, copy requested from family  Spouse  Mohan Rivera  [phone]    19 0627  Yes, patient has an advance directive for healthcare treatment  --  No, copy requested from family  --  --  --    19 1103  Yes, patient has an advance directive for healthcare treatment  Living will  --  Spouse  --  --          Admitting Physician:  Monse Peterson MD  PCP: PARVEZ Freitas CNP    Discharging Nurse: [facility] Unit/Room#: 1729/8203-35  Discharging Unit Phone Number: ***    Emergency Contact:   Extended Emergency Contact Information  Primary Emergency Contact: Julián Amaro  [place] Phone: [phone]  Mobile Phone: [phone]  Relation: Spouse    Past Surgical History:  Past Surgical History:   Procedure Laterality Date    COLONOSCOPY N/A 2019    COLONOSCOPY WITH BIOPSY performed by Sierra Caldera MD at [place] [address]  2019    COLONOSCOPY POLYPECTOMY SNARE/COLD BIOPSY performed by Sierra Caldera MD at [address] [facility] 2019    ROBOTIC LAPAROSCOPIC RIGHT COLECTOMY CPT CODE - 01150 performed by Monse Peterson MD at [address] 2019    EGD performed by Sierra Caldera MD at [address]       Immunization History: There is no immunization history on file for this patient.     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    Iron deficiency anemia secondary to blood loss (chronic) D50.0    Anemia, chronic disease D63.8    Malignant neoplasm of ascending colon (HCC) C18.2    Colon cancer (HCC) C18.9    Carcinoma of ascending colon (HCC) C18.2       Isolation/Infection:   Isolation          No Isolation            Nurse Assessment:  Last Vital Signs: /65   Pulse 89   Temp 98.1 °F (36.7 °C) (Oral)   Resp 18   Ht 5' 8\" (1.727 m)   Wt 177 lb 4 oz (80.4 kg)   SpO2 95%   BMI 26.95 kg/m²     Last documented pain score (0-10 scale): Pain Level: 10  Last Weight:   Wt Readings from Last 1 Encounters:   19 177 lb 4 oz (80.4 kg)     Mental Status:  {IP PT MENTAL STATUS:32315}    IV Access:  { OUSMANE IV ACCESS:078566014}    Nursing Mobility/ADLs:  Walking   {P DME HMJX:029157099}  Transfer  {P DME REOA:567333116}  Bathing  {P DME UESX:881013114}  Dressing  {CHP DME NEAF:103685187}  Toileting  {CHP DME FZTW:335610352}  Feeding  {P DME XECY:424401701}  Med Admin  {P DME OHXQ:375838745}  Med Delivery   { OUSMANE MED Delivery:442787774}    Wound Care Documentation and Therapy:        Elimination:  Continence:   · Bowel: {YES / CN:02043}  · Bladder: {YES / NX:88996}  Urinary Catheter: {Urinary Catheter:415400263}   Colostomy/Ileostomy/Ileal Conduit: {YES / B}       Date of Last BM: ***    Intake/Output Summary (Last 24 hours) at 2019 0943  Last data filed at 2019 0510  Gross per 24 hour   Intake 1240 ml   Output 2750 ml   Net -1510 ml     I/O last 3 completed shifts: In: 7698 [P.O.:240;  I.V.:2000]  Out: 2750 [Urine:2750]    Safety Concerns:     [address] Safety Concerns:145044939}    Impairments/Disabilities:      [address] Impairments/Disabilities:742160701}    Nutrition Therapy:  Current Nutrition Therapy:   Shani[address] ROMEO Diet PKGL:768971193}    Routes of Feeding: {CHP DME Other Feedings:201283009}  Liquids: {Slp liquid thickness:51871}  Daily Fluid Restriction: {CHP DME Yes amt example:859556627}  Last Modified Barium Swallow with Video (Video Swallowing Test): {Done Not Done [facility]:537716943}    Treatments at the Time of Hospital Discharge:   Respiratory Treatments: ***  Oxygen Therapy:  {Therapy; copd oxygen:49383}  Ventilator:    { CC Vent JFZJ:567907143}    Rehab Therapies: {THERAPEUTIC INTERVENTION:5878704304}  Weight Bearing Status/Restrictions: { CC Weight Bearin}  Other Medical Equipment (for information only, NOT a DME order):  {EQUIPMENT:401139099}  Other Treatments: ***    Patient's personal belongings (please select all that are sent with patient):  {[facility] DME Belongings:248747847}    RN SIGNATURE:  {Esignature:001533599}    CASE MANAGEMENT/SOCIAL WORK SECTION    Inpatient Status Date: ***    Readmission Risk Assessment Score:  Readmission Risk              Risk of Unplanned Readmission:        11           Discharging to Facility/ Agency   · Name:   · Address:  · Phone:  · Fax:    Dialysis Facility (if applicable)   · Name:  · Address:  · Dialysis Schedule:  · Phone:  · Fax:    / signature: {Esignature:639533363}    PHYSICIAN SECTION    Prognosis: {Prognosis:6012926162}    Condition at Discharge: [address] [place] [address] Patient Condition:088778639}    Rehab Potential (if transferring to Rehab): {Prognosis:4509925147}    Recommended Labs or Other Treatments After Discharge: ***  Recommended Follow-up, Labs or Other Treatments After Discharge:    ***             Physician Certification: I certify the above information and transfer of Indra Camarillo  is necessary for the continuing treatment of the diagnosis listed and that he requires {Admit to Appropriate Level of Care:27891} for {GREATER/LESS:763962307} 30 days.      Update Admission H&P: {CHP DME Changes in MUZMN:573111688}    PHYSICIAN SIGNATURE: {[facility]:681499613}

## 2019-04-18 PROBLEM — E44.0 MODERATE MALNUTRITION (HCC): Chronic | Status: ACTIVE | Noted: 2019-04-18

## 2019-04-18 LAB
ALBUMIN SERPL-MCNC: 3.9 G/DL (ref 3.4–5)
ANION GAP SERPL CALCULATED.3IONS-SCNC: 12 MMOL/L (ref 3–16)
BUN BLDV-MCNC: 4 MG/DL (ref 7–20)
CALCIUM SERPL-MCNC: 8.8 MG/DL (ref 8.3–10.6)
CHLORIDE BLD-SCNC: 98 MMOL/L (ref 99–110)
CO2: 24 MMOL/L (ref 21–32)
CREAT SERPL-MCNC: 0.7 MG/DL (ref 0.9–1.3)
FERRITIN: 281 NG/ML (ref 30–400)
GFR AFRICAN AMERICAN: >60
GFR NON-AFRICAN AMERICAN: >60
GLUCOSE BLD-MCNC: 116 MG/DL (ref 70–99)
HCT VFR BLD CALC: 34.9 % (ref 40.5–52.5)
HEMOGLOBIN: 10.4 G/DL (ref 13.5–17.5)
IRON SATURATION: 8 % (ref 20–50)
IRON: 20 UG/DL (ref 59–158)
MAGNESIUM: 2.1 MG/DL (ref 1.8–2.4)
MCH RBC QN AUTO: 20.8 PG (ref 26–34)
MCHC RBC AUTO-ENTMCNC: 29.7 G/DL (ref 31–36)
MCV RBC AUTO: 70.1 FL (ref 80–100)
PDW BLD-RTO: 38.4 % (ref 12.4–15.4)
PHOSPHORUS: 1.3 MG/DL (ref 2.5–4.9)
PLATELET # BLD: 389 K/UL (ref 135–450)
PMV BLD AUTO: 8.6 FL (ref 5–10.5)
POTASSIUM SERPL-SCNC: 3.9 MMOL/L (ref 3.5–5.1)
RBC # BLD: 4.99 M/UL (ref 4.2–5.9)
SODIUM BLD-SCNC: 134 MMOL/L (ref 136–145)
TOTAL IRON BINDING CAPACITY: 259 UG/DL (ref 260–445)
WBC # BLD: 10 K/UL (ref 4–11)

## 2019-04-18 PROCEDURE — 80069 RENAL FUNCTION PANEL: CPT

## 2019-04-18 PROCEDURE — 83550 IRON BINDING TEST: CPT

## 2019-04-18 PROCEDURE — 94761 N-INVAS EAR/PLS OXIMETRY MLT: CPT

## 2019-04-18 PROCEDURE — 2700000000 HC OXYGEN THERAPY PER DAY

## 2019-04-18 PROCEDURE — 83540 ASSAY OF IRON: CPT

## 2019-04-18 PROCEDURE — 2580000003 HC RX 258: Performed by: STUDENT IN AN ORGANIZED HEALTH CARE EDUCATION/TRAINING PROGRAM

## 2019-04-18 PROCEDURE — 84238 ASSAY NONENDOCRINE RECEPTOR: CPT

## 2019-04-18 PROCEDURE — 6360000002 HC RX W HCPCS: Performed by: STUDENT IN AN ORGANIZED HEALTH CARE EDUCATION/TRAINING PROGRAM

## 2019-04-18 PROCEDURE — 82728 ASSAY OF FERRITIN: CPT

## 2019-04-18 PROCEDURE — 85027 COMPLETE CBC AUTOMATED: CPT

## 2019-04-18 PROCEDURE — 83735 ASSAY OF MAGNESIUM: CPT

## 2019-04-18 PROCEDURE — 99024 POSTOP FOLLOW-UP VISIT: CPT | Performed by: SURGERY

## 2019-04-18 PROCEDURE — 1200000000 HC SEMI PRIVATE

## 2019-04-18 PROCEDURE — 94770 HC ETCO2 MONITOR DAILY: CPT

## 2019-04-18 PROCEDURE — 36415 COLL VENOUS BLD VENIPUNCTURE: CPT

## 2019-04-18 PROCEDURE — 6360000002 HC RX W HCPCS: Performed by: SURGERY

## 2019-04-18 PROCEDURE — 2500000003 HC RX 250 WO HCPCS: Performed by: CLINICAL NURSE SPECIALIST

## 2019-04-18 RX ORDER — ACETAMINOPHEN 10 MG/ML
1000 INJECTION, SOLUTION INTRAVENOUS EVERY 6 HOURS
Status: COMPLETED | OUTPATIENT
Start: 2019-04-18 | End: 2019-04-19

## 2019-04-18 RX ORDER — PROCHLORPERAZINE EDISYLATE 5 MG/ML
5 INJECTION INTRAMUSCULAR; INTRAVENOUS EVERY 6 HOURS PRN
Status: DISCONTINUED | OUTPATIENT
Start: 2019-04-18 | End: 2019-04-21 | Stop reason: HOSPADM

## 2019-04-18 RX ADMIN — PROCHLORPERAZINE EDISYLATE 5 MG: 5 INJECTION INTRAMUSCULAR; INTRAVENOUS at 08:02

## 2019-04-18 RX ADMIN — ACETAMINOPHEN 1000 MG: 10 INJECTION, SOLUTION INTRAVENOUS at 22:36

## 2019-04-18 RX ADMIN — FAMOTIDINE 20 MG: 10 INJECTION, SOLUTION INTRAVENOUS at 20:35

## 2019-04-18 RX ADMIN — ACETAMINOPHEN 1000 MG: 10 INJECTION, SOLUTION INTRAVENOUS at 02:45

## 2019-04-18 RX ADMIN — ENOXAPARIN SODIUM 40 MG: 40 INJECTION SUBCUTANEOUS at 08:02

## 2019-04-18 RX ADMIN — Medication: at 03:38

## 2019-04-18 RX ADMIN — Medication 10 ML: at 20:36

## 2019-04-18 RX ADMIN — ACETAMINOPHEN 1000 MG: 10 INJECTION, SOLUTION INTRAVENOUS at 10:32

## 2019-04-18 RX ADMIN — ONDANSETRON 4 MG: 2 INJECTION INTRAMUSCULAR; INTRAVENOUS at 06:00

## 2019-04-18 RX ADMIN — Medication: at 21:16

## 2019-04-18 RX ADMIN — FAMOTIDINE 20 MG: 10 INJECTION, SOLUTION INTRAVENOUS at 08:02

## 2019-04-18 RX ADMIN — ACETAMINOPHEN 1000 MG: 10 INJECTION, SOLUTION INTRAVENOUS at 16:48

## 2019-04-18 RX ADMIN — DIPHENHYDRAMINE HYDROCHLORIDE 6.25 MG: 50 INJECTION, SOLUTION INTRAMUSCULAR; INTRAVENOUS at 18:20

## 2019-04-18 RX ADMIN — DIPHENHYDRAMINE HYDROCHLORIDE 6.25 MG: 50 INJECTION, SOLUTION INTRAMUSCULAR; INTRAVENOUS at 03:23

## 2019-04-18 ASSESSMENT — PAIN SCALES - GENERAL
PAINLEVEL_OUTOF10: 3
PAINLEVEL_OUTOF10: 3
PAINLEVEL_OUTOF10: 5
PAINLEVEL_OUTOF10: 3

## 2019-04-18 NOTE — PROGRESS NOTES
Considering FTS, traveled to [place] at the endo of October- discussed Jose David Mcdonough exposure- referred to MFM<, pap,gc/chlam/trich done, could not visualize fetus with transabd scan- sent for TV US Shift assessment completed. Pt A&O, VSS. Pt already in chair this morning. Midline incision & lap sites all C/D/I. Pain is better today. Passing more gas, active bowel sounds present. Non skid socks on. Denies any needs at this time. Bed locked and in lowest position. Call light within reach. Will continue to monitor.

## 2019-04-18 NOTE — PROGRESS NOTES
Pt wanted to sit up in chair and while moving to chair he c/o shoulder pain. Explained to pt that this is gas pain. Soon after sitting in chair pt belched about 4 times and states he \"feels sick. \" Jacqueline Tobias as ordered and requested and provided ice chips by pt request. Will continue to monitor closely.

## 2019-04-18 NOTE — PROGRESS NOTES
Consult PerfectServed/called to [facility] on 04/18/19 at 8:56 AM Anthony Pierson

## 2019-04-18 NOTE — PROGRESS NOTES
Nutrition Assessment    Type and Reason for Visit: Initial, Positive Nutrition Screen    Nutrition Recommendations:   · Continue clear liquids, advance diet as tolerated per General Surgery  · Continue Ensure Clear ONS. · Monitor and encourage PO intakes   · Monitor nutrition adequacy, weights, pertinent labs, BMs and clinical progress    Nutrition Assessment: Positive screen for weight loss and poor PO intake. Pt malnourished upon admission related to colon CA weight loss, muscle and fat loss. S/p R hemicolectomy. At risk for nutrition decline d/t clear liquid diet and increased nutrition needs. Pt reports abdominal pain shortly after consuming clear liquids. Also reports belching, no flatus. Encouraged slow progression as able with clears. He reports liking Ensure clear, will conitnue. Will continue to monitor nutrition status and diet progression. Malnutrition Assessment:  · Malnutrition Status: Meets the criteria for moderate malnutrition  · Context: Chronic illness  · Findings of the 6 clinical characteristics of malnutrition (Minimum of 2 out of 6 clinical characteristics is required to make the diagnosis of moderate or severe Protein Calorie Malnutrition based on AND/ASPEN Guidelines):  1. Energy Intake-Less than or equal to 75% of estimated energy requirement, Greater than or equal to 7 days    2. Weight Loss-5% loss or greater, (2 weeks)  3. Fat Loss-Moderate subcutaneous fat loss, Orbital  4. Muscle Loss-Moderate muscle mass loss, Temples (temporalis muscle), Clavicles (pectoralis and deltoids)  5. Fluid Accumulation-Unable to assess,    6.   Strength-Not measured    Nutrition Risk Level: High    Nutrient Needs:  · Estimated Daily Total Kcal: 7131-3879  · Estimated Daily Protein (g):   · Estimated Daily Total Fluid (ml/day): 1 ml/kcal or per MD    Nutrition Diagnosis:   · Problem: Inadequate oral intake  · Etiology: related to Insufficient energy/nutrient consumption, Alteration in GI function     Signs and symptoms:  as evidenced by Diet history of poor intake, Intake 0-25%, Weight loss    Objective Information:  · Nutrition-Focused Physical Findings: Active BS  · Wound Type: Surgical Wound(abdomen)  · Current Nutrition Therapies:  · Oral Diet Orders: Clear Liquid   · Oral Diet intake: 1-25%(clear liquids)  · Oral Nutrition Supplement (ONS) Orders: Clear Liquid Oral Supplement  · ONS intake: 51-75%  · Anthropometric Measures:  · Ht: 5' 8\" (172.7 cm)   · Current Body Wt: 161 lb (73 kg)  · Usual Body Wt: 174 lb (78.9 kg)(April 1, 2019)  · % Weight Change:  6.4% weight loss x 2 weeks  · Ideal Body Wt: 154 lb (69.9 kg)  · BMI Classification: BMI 18.5 - 24.9 Normal Weight    Nutrition Interventions:   Continue current diet, Continue current ONS  Continued Inpatient Monitoring    Nutrition Evaluation:   · Evaluation: Goals set   · Goals: Pt will tolerate diet and advancement with PO intakes of 50% or greater this admission    · Monitoring: Nutrition Progression, Meal Intake, Supplement Intake, Diet Tolerance, Pertinent Labs      Electronically signed by Brenna Salvador RD, LD on 4/18/19 at 3:22 PM    Contact Number: Office: [phone]; [address]: [address]

## 2019-04-18 NOTE — PLAN OF CARE
Problem: Pain:  Description  Pain management should include both nonpharmacologic and pharmacologic interventions. Goal: Pain level will decrease  Description  Pain level will decrease  4/18/2019 1048 by Litzy Arroyo RN  Outcome: Ongoing  Note:   Pt reporting pain 6 out of 10 on the pain scale today. PCA pump is effective for patient. Pt states that pain is better today. Will continue to monitor and reassess pain.

## 2019-04-18 NOTE — CONSULTS
Hematology/Oncology Consultation         Patient:   Ariela Obrien       Reason for Consult:  New colon cancer   Requesting Physician: Eleanor Rapp*    Chief Complaint:     Colon mass         History Obtained from:     patient, electronic medical record    History of Present Illness:     Ariela Obrien is a 48 y.o. male  with an insignificant PMH who presents with colon mass and need for right hemicolectomy for colonic adenocarcinoma found on C-scope. He is POD 2 R hemicolectomy and path demonstrates a T2N0M0 disease. CT C/A/P without mets and CEA is 0.8. We are consulted to establish outpatient follow up and determine the need for adjuvant treatment. He is not passing gas. Wells cath in place. Pain controlled, abd pain improving. NO SOB.      Past Medical History:         Diagnosis Date    GERD (gastroesophageal reflux disease)     resolved    Malignant neoplasm of ascending colon ([facility] [address] 4/9/2019       Past Surgical History:         Procedure Laterality Date    COLONOSCOPY N/A 4/4/2019    COLONOSCOPY WITH BIOPSY performed by Brianna Lazaro MD at [address]  4/4/2019    COLONOSCOPY POLYPECTOMY SNARE/COLD BIOPSY performed by Brianna Lazaro MD at [address] Right 4/16/2019    ROBOTIC LAPAROSCOPIC RIGHT COLECTOMY CPT CODE - 16662 performed by Rudi Hsu MD at [address] 4/4/2019    EGD performed by Brianna Lazaro MD at [address]       Current Medications:     Current Facility-Administered Medications   Medication Dose Route Frequency Provider Last Rate Last Dose    prochlorperazine (COMPAZINE) injection 5 mg  5 mg Intravenous Q6H PRN Corrinne Files, MD   5 mg at 04/18/19 0802    acetaminophen (OFIRMEV) infusion 1,000 mg  1,000 mg Intravenous Q6H Corrinne Files, MD        famotidine (PEPCID) injection 20 mg  20 mg Intravenous BID Samia Gant APRN - CNP   20 mg at 04/18/19 0802    diphenhydrAMINE (BENADRYL) injection 6.25 mg  6.25 mg Intravenous Q6H PRN Meredith Al MD   6.25 mg at 04/18/19 0323    sodium chloride flush 0.9 % injection 10 mL  10 mL Intravenous 2 times per day Adalberto Goldman MD   10 mL at 04/17/19 2046    sodium chloride flush 0.9 % injection 10 mL  10 mL Intravenous PRN Adalberto Goldman MD        ondansetron [facility]) injection 4 mg  4 mg Intravenous Q6H PRN Adalberto Goldman MD   4 mg at 04/18/19 0600    enoxaparin (LOVENOX) injection 40 mg  40 mg Subcutaneous Daily Adalberto Goldman MD   40 mg at 04/18/19 0802    midazolam (VERSED) injection 1 mg  1 mg Intravenous PRN Joy Carter MD   1 mg at 04/16/19 1341    naloxone [facility]) injection 0.4 mg  0.4 mg Intravenous PRN Adalberto Goldman MD        HYDROmorphone (DILAUDID) 10 mg/50 mL PCA   Intravenous Continuous Adalberto Goldman MD           Allergies:      Allergies   Allergen Reactions    Prednisone Nausea And Vomiting       Social History:     Social History     Socioeconomic History    Marital status:      Spouse name: Not on file    Number of children: Not on file    Years of education: Not on file    Highest education level: Not on file   Occupational History    Not on file   Social Needs    Financial resource strain: Not on file    Food insecurity:     Worry: Not on file     Inability: Not on file    Transportation needs:     Medical: Not on file     Non-medical: Not on file   Tobacco Use    Smoking status: Never Smoker    Smokeless tobacco: Never Used   Substance and Sexual Activity    Alcohol use: Not Currently     Frequency: Never    Drug use: Never    Sexual activity: Not on file   Lifestyle    Physical activity:     Days per week: Not on file     Minutes per session: Not on file    Stress: Not on file   Relationships    Social connections:     Talks on phone: Not on file     Gets together: Not on file     Attends Oriental orthodox service: Not on file     Active member of club or organization: Not on file     Attends meetings of clubs or organizations: Not on file     Relationship status: Not on file    Intimate partner violence:     Fear of current or ex partner: Not on file     Emotionally abused: Not on file     Physically abused: Not on file     Forced sexual activity: Not on file   Other Topics Concern    Not on file   Social History Narrative    Not on file     Social History     Substance and Sexual Activity   Drug Use Never     Social History     Substance and Sexual Activity   Alcohol Use Not Currently    Frequency: Never     Social History     Substance and Sexual Activity   Sexual Activity Not on file     Social History     Tobacco Use   Smoking Status Never Smoker   Smokeless Tobacco Never Used       Family History:     Family History   Problem Relation Age of Onset    Other Mother         diverticulitis    Diabetes Father     Cancer Father         unsure       Review of Systems:     10 point ROS negative unless noted above     Physical Exam:     /87   Pulse 108   Temp 100.3 °F (37.9 °C) (Oral)   Resp 18   Ht 5' 8\" (1.727 m)   Wt 161 lb 4.8 oz (73.2 kg)   SpO2 98%   BMI 24.53 kg/m²     CONSTITUTIONAL: awake, alert, cooperative, no apparent distress. EYES:  Pupils equal, round and reactive to light, sclera non-icteric, conjunctiva normal  ENT:  Normocephalic, without obvious abnormality, atraumatic, sinuses nontender on palpation, external ears without lesions, oral pharynx with moist mucus membranes, no mucositis.   NECK:  Supple, symmetrical, trachea midline, no adenopathy, thyroid symmetric, not enlarged and no tenderness, skin normal  HEMATOLOGIC/LYMPHATICS:  no cervical lymphadenopathy, no supraclavicular lymphadenopathy, no axillary lymphadenopathy and no inguinal lymphadenopathy  BACK:  Symmetric, no curvature, spinous processes are non-tender on palpation, paraspinous muscles are non-tender on palpation, Not identified  Histologic Type: Adenocarcinoma  Histologic Grade: G1: Well differentiated to G2: Moderately  differentiated  Tumor Extension: Tumor invades submucosa with involvement of the  muscularis propria  Margins: All margins are uninvolved by invasive carcinoma, high-grade  dysplasia, intramucosal adenocarcinoma, and adenoma  Margins examined: distal, proximal, mesenteric (tumor lies 1.3 cm from  mesenteric edge)  Treatment Effect: No known presurgical therapy  Lymphovascular Invasion: Not identified  Perineural Invasion: Not identified  Tumor Deposits: Not identified  Regional Lymph Nodes  Number of Lymph Nodes Involved: 0  Number of Lymph Nodes Examined: 22  Pathologic Stage Classification (pTNM, AJCC 8th Edition)  Primary Tumor (pT)  pT2: Tumor invades the muscularis propria  Regional Lymph Nodes (pN)  pN0: No regional lymph node metastasis  Additional Pathologic Findings: Tubular adenomas        HANDI/HANDI    MAGNESIUM:    Lab Results   Component Value Date    MG 2.10 04/18/2019       PT/INR:    No results found for: PTINR    No results found for: INR    PTT:    No results found for: APTT    U/A:  No results found for: NITRITE, COLORU, PHUR, LABCAST, WBCUA, RBCUA, MUCUS, TRICHOMONAS, YEAST, BACTERIA, CLARITYU, SPECGRAV, LEUKOCYTESUR, UROBILINOGEN, BILIRUBINUR, BLOODU, GLUCOSEU, AMORPHOUS    Imaging:     EXAMINATION:   CT OF THE CHEST WITHOUT CONTRAST 4/9/2019 11:30 am       TECHNIQUE:   CT of the chest was performed without the administration of intravenous   contrast. Multiplanar reformatted images are provided for review.  Dose   modulation, iterative reconstruction, and/or weight based adjustment of the   mA/kV was utilized to reduce the radiation dose to as low as reasonably   achievable.       COMPARISON:   None.       HISTORY:   ORDERING SYSTEM PROVIDED HISTORY: Colonic mass   TECHNOLOGIST PROVIDED HISTORY:   Reason for exam:->colon cancer   Ordering Physician Provided Reason for Exam: recent dx replace as needed     Okay to dc when surgically cleared and will follow up with Dr. Hector Tang in a month to discuss MSI testing. Discussed with Dr. Hector Tang. He will see in the AM.              Thank you very much for allowing me to participate in the care of this patient.     Josiane Paz CNP   Medical Oncology/Hematology    [address], [facility] [address]  Phone: [phone]  Fax: [phone]

## 2019-04-18 NOTE — PROGRESS NOTES
[facility] GENERAL SURGERY    Surgery Progress Note           POD # 2    PATIENT NAME: Jefry Lynn     TODAY'S DATE: 4/18/2019    INTERVAL HISTORY:    C/o nausea this AM, no vomiting. Pain is controlled with PCA. OBJECTIVE:   VITALS:  BP (!) 147/81   Pulse 84   Temp 98.6 °F (37 °C)   Resp 16   Ht 5' 8\" (1.727 m)   Wt 161 lb 4.8 oz (73.2 kg)   SpO2 96%   BMI 24.53 kg/m²     INTAKE/OUTPUT:    I/O last 3 completed shifts: In: 1420.6 [P.O.:840; I.V.:580.6]  Out: 3550 [Urine:3550]  No intake/output data recorded. CONSTITUTIONAL:  awake and alert  LUNGS:  no crackles or wheezing  ABDOMEN:   hypoactive bowel sounds, soft, non-distended, tenderness noted incisions   INCISION: clean, dry, no drainage    Data:  CBC:   Recent Labs     04/16/19  0701 04/17/19  0514 04/18/19  0709   WBC 4.5 6.2 10.0   HGB 9.4* 9.1* 10.4*   HCT 31.6* 31.1* 34.9*    308 389     BMP:    Recent Labs     04/16/19  0701 04/17/19  0514    138   K 4.8 3.6    101   CO2 25 27   BUN 8 5*   CREATININE 0.7* 0.7*   GLUCOSE 86 95     Hepatic: No results for input(s): AST, ALT, ALB, BILITOT, ALKPHOS in the last 72 hours. Mag:      Recent Labs     04/17/19  0514   MG 2.00      Phos:     Recent Labs     04/17/19  0514   PHOS 2.1*          ASSESSMENT AND PLAN: 47 yo male s/p robotic right colectomy on 4/16    - Some nausea this AM, will add Compazine PRN, continue clears for now  - Continue PCA and Ofirmev for pain control  - UOP of 3.5 L over the past 24 hrs, will saline lock his IVF  - Awaiting return of GI function     Arlet Goldman MD PGY 4  General Surgery Resident       Patient seen and agree with above.   Continue clears  PCA  D/c fermín Theodore MD

## 2019-04-19 PROCEDURE — 6360000002 HC RX W HCPCS: Performed by: SURGERY

## 2019-04-19 PROCEDURE — 2500000003 HC RX 250 WO HCPCS: Performed by: CLINICAL NURSE SPECIALIST

## 2019-04-19 PROCEDURE — 99024 POSTOP FOLLOW-UP VISIT: CPT | Performed by: SURGERY

## 2019-04-19 PROCEDURE — 1200000000 HC SEMI PRIVATE

## 2019-04-19 PROCEDURE — 6360000002 HC RX W HCPCS: Performed by: STUDENT IN AN ORGANIZED HEALTH CARE EDUCATION/TRAINING PROGRAM

## 2019-04-19 PROCEDURE — 6370000000 HC RX 637 (ALT 250 FOR IP): Performed by: SURGERY

## 2019-04-19 PROCEDURE — 94770 HC ETCO2 MONITOR DAILY: CPT

## 2019-04-19 PROCEDURE — 2580000003 HC RX 258

## 2019-04-19 PROCEDURE — 2580000003 HC RX 258: Performed by: STUDENT IN AN ORGANIZED HEALTH CARE EDUCATION/TRAINING PROGRAM

## 2019-04-19 RX ORDER — OXYCODONE HYDROCHLORIDE AND ACETAMINOPHEN 5; 325 MG/1; MG/1
2 TABLET ORAL EVERY 4 HOURS PRN
Status: DISCONTINUED | OUTPATIENT
Start: 2019-04-19 | End: 2019-04-21 | Stop reason: HOSPADM

## 2019-04-19 RX ORDER — MAGNESIUM HYDROXIDE/ALUMINUM HYDROXICE/SIMETHICONE 120; 1200; 1200 MG/30ML; MG/30ML; MG/30ML
30 SUSPENSION ORAL EVERY 6 HOURS PRN
Status: DISCONTINUED | OUTPATIENT
Start: 2019-04-19 | End: 2019-04-21 | Stop reason: HOSPADM

## 2019-04-19 RX ORDER — SODIUM CHLORIDE 9 MG/ML
INJECTION, SOLUTION INTRAVENOUS
Status: COMPLETED
Start: 2019-04-19 | End: 2019-04-19

## 2019-04-19 RX ORDER — KETOROLAC TROMETHAMINE 30 MG/ML
30 INJECTION, SOLUTION INTRAMUSCULAR; INTRAVENOUS EVERY 6 HOURS
Status: DISCONTINUED | OUTPATIENT
Start: 2019-04-19 | End: 2019-04-21 | Stop reason: HOSPADM

## 2019-04-19 RX ORDER — OXYCODONE HYDROCHLORIDE AND ACETAMINOPHEN 5; 325 MG/1; MG/1
1 TABLET ORAL EVERY 4 HOURS PRN
Status: DISCONTINUED | OUTPATIENT
Start: 2019-04-19 | End: 2019-04-21 | Stop reason: HOSPADM

## 2019-04-19 RX ORDER — MORPHINE SULFATE 2 MG/ML
2 INJECTION, SOLUTION INTRAMUSCULAR; INTRAVENOUS
Status: DISCONTINUED | OUTPATIENT
Start: 2019-04-19 | End: 2019-04-21 | Stop reason: HOSPADM

## 2019-04-19 RX ADMIN — ALUMINUM HYDROXIDE, MAGNESIUM HYDROXIDE, AND SIMETHICONE 30 ML: 200; 200; 20 SUSPENSION ORAL at 16:46

## 2019-04-19 RX ADMIN — SODIUM CHLORIDE, PRESERVATIVE FREE 10 ML: 5 INJECTION INTRAVENOUS at 22:32

## 2019-04-19 RX ADMIN — ENOXAPARIN SODIUM 40 MG: 40 INJECTION SUBCUTANEOUS at 09:27

## 2019-04-19 RX ADMIN — KETOROLAC TROMETHAMINE 30 MG: 30 INJECTION, SOLUTION INTRAMUSCULAR; INTRAVENOUS at 22:32

## 2019-04-19 RX ADMIN — FAMOTIDINE 20 MG: 10 INJECTION, SOLUTION INTRAVENOUS at 09:27

## 2019-04-19 RX ADMIN — ACETAMINOPHEN 1000 MG: 10 INJECTION, SOLUTION INTRAVENOUS at 05:07

## 2019-04-19 RX ADMIN — Medication 10 ML: at 10:06

## 2019-04-19 RX ADMIN — OXYCODONE AND ACETAMINOPHEN 2 TABLET: 5; 325 TABLET ORAL at 15:22

## 2019-04-19 RX ADMIN — SODIUM CHLORIDE: 9 INJECTION, SOLUTION INTRAVENOUS at 07:45

## 2019-04-19 RX ADMIN — FAMOTIDINE 20 MG: 10 INJECTION, SOLUTION INTRAVENOUS at 20:53

## 2019-04-19 RX ADMIN — SODIUM CHLORIDE, PRESERVATIVE FREE 10 ML: 5 INJECTION INTRAVENOUS at 20:54

## 2019-04-19 RX ADMIN — KETOROLAC TROMETHAMINE 30 MG: 30 INJECTION, SOLUTION INTRAMUSCULAR; INTRAVENOUS at 16:47

## 2019-04-19 RX ADMIN — MORPHINE SULFATE 2 MG: 2 INJECTION, SOLUTION INTRAMUSCULAR; INTRAVENOUS at 14:31

## 2019-04-19 RX ADMIN — MORPHINE SULFATE 2 MG: 2 INJECTION, SOLUTION INTRAMUSCULAR; INTRAVENOUS at 10:23

## 2019-04-19 RX ADMIN — Medication 10 ML: at 21:18

## 2019-04-19 RX ADMIN — OXYCODONE AND ACETAMINOPHEN 2 TABLET: 5; 325 TABLET ORAL at 21:00

## 2019-04-19 RX ADMIN — OXYCODONE AND ACETAMINOPHEN 2 TABLET: 5; 325 TABLET ORAL at 11:10

## 2019-04-19 ASSESSMENT — PAIN DESCRIPTION - PAIN TYPE
TYPE: SURGICAL PAIN
TYPE: ACUTE PAIN;SURGICAL PAIN
TYPE: SURGICAL PAIN;OTHER (COMMENT)
TYPE: ACUTE PAIN;SURGICAL PAIN

## 2019-04-19 ASSESSMENT — PAIN SCALES - GENERAL
PAINLEVEL_OUTOF10: 5
PAINLEVEL_OUTOF10: 10
PAINLEVEL_OUTOF10: 0
PAINLEVEL_OUTOF10: 8
PAINLEVEL_OUTOF10: 10
PAINLEVEL_OUTOF10: 6
PAINLEVEL_OUTOF10: 10
PAINLEVEL_OUTOF10: 5
PAINLEVEL_OUTOF10: 5
PAINLEVEL_OUTOF10: 10

## 2019-04-19 ASSESSMENT — PAIN DESCRIPTION - ORIENTATION
ORIENTATION: LOWER;MID

## 2019-04-19 ASSESSMENT — PAIN DESCRIPTION - DESCRIPTORS
DESCRIPTORS: DISCOMFORT;JABBING;SHARP
DESCRIPTORS: SHARP

## 2019-04-19 ASSESSMENT — PAIN DESCRIPTION - LOCATION
LOCATION: ABDOMEN

## 2019-04-19 ASSESSMENT — PAIN DESCRIPTION - PROGRESSION
CLINICAL_PROGRESSION: NOT CHANGED
CLINICAL_PROGRESSION: OTHER (COMMENT)
CLINICAL_PROGRESSION: NOT CHANGED

## 2019-04-19 ASSESSMENT — PAIN DESCRIPTION - FREQUENCY
FREQUENCY: CONTINUOUS

## 2019-04-19 ASSESSMENT — PAIN DESCRIPTION - ONSET
ONSET: ON-GOING
ONSET: OTHER (COMMENT)

## 2019-04-19 NOTE — PROGRESS NOTES
04/19/19 0757   Oxygen Therapy/Pulse Ox   O2 Device None (Room air)   Resp 19   SpO2 96 %   $End Tidal CO2 37

## 2019-04-19 NOTE — PLAN OF CARE
Pt a/o, rates pain appropriately using 0-10 pain scale; pt using PCA as needed for pain; will continue to monitor resp status and pain level.

## 2019-04-19 NOTE — PROGRESS NOTES
Administered both toradol IV for pain of 10 and Symethicon for gas per order. Repositioned and closed door for noice reduction purpose.

## 2019-04-19 NOTE — PROGRESS NOTES
ONCOLOGY HEMATOLOGY CARE PROGRESS NOTE      SUBJECTIVE:     The patient states he is having abdominal cramping, but overall feels pretty well. ROS:   The remaining 10 point review of symptoms is unremarkable. OBJECTIVE        Physical    VITALS:  BP (!) 150/91   Pulse 91   Temp 97.9 °F (36.6 °C) (Oral)   Resp 19   Ht 5' 8\" (1.727 m)   Wt 176 lb 2.4 oz (79.9 kg)   SpO2 96%   BMI 26.78 kg/m²   TEMPERATURE:  Current - Temp: 97.9 °F (36.6 °C); Max - Temp  Av.1 °F (36.7 °C)  Min: 97.9 °F (36.6 °C)  Max: 98.2 °F (36.8 °C)  PULSE OXIMETRY RANGE: SpO2  Av.7 %  Min: 91 %  Max: 96 %  24HR INTAKE/OUTPUT:      Intake/Output Summary (Last 24 hours) at 2019 1900  Last data filed at 2019 0545  Gross per 24 hour   Intake 1473 ml   Output 2950 ml   Net -1477 ml       CONSTITUTIONAL:  awake, alert, cooperative, no apparent distress, HEENT oral pharynx , no scleral icterus  HEMATOLOGIC/LYMPHATICS:  no cervical lymphadenopathy, no supraclavicular lymphadenopathy, no axillary lymphadenopathy and no inguinal lymphadenopathy  LUNGS:  No increased work of breathing, good air exchange, clear to auscultation bilaterally, no crackles or wheezing  CARDIOVASCULAR:  , regular rate and rhythm, normal S1 and S2, no S3 or S4, and no murmur noted  ABDOMEN:  Bowel sounds are minimal, but present. His abdomen is minimally tender and appropriate. There is no rebound or guarding. MUSCULOSKELETAL:  There is no redness, warmth, or swelling of the joints. EXTREMETIES: No clubbing cynosis or edema  NEUROLOGIC:  Awake, alert, oriented to name, place and time. Cranial nerves II-XII are grossly intact. Motor is 5 out of 5 bilaterally.    SKIN:  no bruising or bleeding      Data      Recent Labs     19  0514 19  0709   WBC 6.2 10.0   HGB 9.1* 10.4*   HCT 31.1* 34.9*    389   MCV 69.0* 70.1*        Recent Labs     19  0514 19  0709    134*   K 3.6 3.9  98*   CO2 27 24   PHOS 2.1* 1.3*   BUN 5* 4*   CREATININE 0.7* 0.7*     No results for input(s): AST, ALT, ALB, BILIDIR, BILITOT, ALKPHOS in the last 72 hours.     Magnesium:    Lab Results   Component Value Date    MG 2.10 04/18/2019    MG 2.00 04/17/2019         Problem List  Patient Active Problem List   Diagnosis    Iron deficiency anemia due to chronic blood loss    Polyp of transverse colon    Colon tumor    Iron malabsorption    Iron deficiency anemia secondary to blood loss (chronic)    Anemia, chronic disease    Malignant neoplasm of ascending colon (HCC)    [facility] [address]    [facility]cinoma of ascending colon ([facility]    Moderate malnutrition ([facility] [address]       ASSESSMENT AND PLAN:    T2, N0, M1 0 colon carcinoma  -He has very early disease with excellent survival  -There is no indication for adjuvant chemotherapy at this time  -Due to his young age, we will send MMR testing when he returns in the office to see if he needs genetic screening  -He has no family history of colon cancer          ONCOLOGIC DISPOSITION:    -No oncologic barriers to discharge    Mynor Lea MD  Please contact through [facility]

## 2019-04-19 NOTE — PROGRESS NOTES
[facility] GENERAL SURGERY    Surgery Progress Note           POD # 3    PATIENT NAME: Vandana Mendoza     TODAY'S DATE: 4/19/2019    INTERVAL HISTORY:    Pt with some improved pain. OBJECTIVE:   VITALS:  BP (!) 150/91   Pulse 91   Temp 97.9 °F (36.6 °C) (Oral)   Resp 19   Ht 5' 8\" (1.727 m)   Wt 176 lb 2.4 oz (79.9 kg)   SpO2 96%   BMI 26.78 kg/m²     INTAKE/OUTPUT:    I/O last 3 completed shifts: In: 9833 [P.O.:1000; I.V.:473]  Out: 2950 [Urine:2950]  No intake/output data recorded. CONSTITUTIONAL:  awake and alert  LUNGS:  no crackles or wheezing  ABDOMEN:   hypoactive bowel sounds, soft, non-distended, tender   INCISION: dry    Data:  CBC:   Recent Labs     04/17/19  0514 04/18/19  0709   WBC 6.2 10.0   HGB 9.1* 10.4*   HCT 31.1* 34.9*    389     BMP:    Recent Labs     04/17/19  0514 04/18/19  0709    134*   K 3.6 3.9    98*   CO2 27 24   BUN 5* 4*   CREATININE 0.7* 0.7*   GLUCOSE 95 116*     Hepatic: No results for input(s): AST, ALT, ALB, BILITOT, ALKPHOS in the last 72 hours. Mag:      Recent Labs     04/17/19  0514 04/18/19  0709   MG 2.00 2.10      Phos:     Recent Labs     04/17/19  0514 04/18/19  0709   PHOS 2.1* 1.3*      INR: No results for input(s): INR in the last 72 hours. Radiology Review:  NA    ASSESSMENT AND PLAN:  48 y.o. male status post robotic right colectomy  1. Full liquid diet  2. D/c pca and start po pain meds  3.   Oncology input appreciated         Electronically signed by Melvin Chambers MD

## 2019-04-19 NOTE — PROGRESS NOTES
Patient reassessed for pain after administration of gas med and pain medication. Patient rates pain at zero and is sitting up in bed eating dinner. No further complains.

## 2019-04-19 NOTE — PROGRESS NOTES
Walked patient along the hallway x2. Patient tolerated well. Still unable to pass flatus and complains of servere gas pains. Will attempt to walk later in the afternoon.

## 2019-04-19 NOTE — PROGRESS NOTES
Patient is finally resting with eyes closed in bed. Patient seem comfortable and was not awakened when I entered the room for post pain medication administration  Assessment.

## 2019-04-20 LAB — SOLUBLE TRANSFERRIN RECEPT: 23.7 MG/L (ref 2.2–5)

## 2019-04-20 PROCEDURE — 6370000000 HC RX 637 (ALT 250 FOR IP): Performed by: SURGERY

## 2019-04-20 PROCEDURE — 6360000002 HC RX W HCPCS: Performed by: SURGERY

## 2019-04-20 PROCEDURE — 2580000003 HC RX 258: Performed by: STUDENT IN AN ORGANIZED HEALTH CARE EDUCATION/TRAINING PROGRAM

## 2019-04-20 PROCEDURE — 99024 POSTOP FOLLOW-UP VISIT: CPT | Performed by: SURGERY

## 2019-04-20 PROCEDURE — 6360000002 HC RX W HCPCS: Performed by: STUDENT IN AN ORGANIZED HEALTH CARE EDUCATION/TRAINING PROGRAM

## 2019-04-20 PROCEDURE — 2500000003 HC RX 250 WO HCPCS: Performed by: CLINICAL NURSE SPECIALIST

## 2019-04-20 PROCEDURE — 1200000000 HC SEMI PRIVATE

## 2019-04-20 RX ADMIN — KETOROLAC TROMETHAMINE 30 MG: 30 INJECTION, SOLUTION INTRAMUSCULAR; INTRAVENOUS at 04:08

## 2019-04-20 RX ADMIN — OXYCODONE AND ACETAMINOPHEN 2 TABLET: 5; 325 TABLET ORAL at 19:38

## 2019-04-20 RX ADMIN — FAMOTIDINE 20 MG: 10 INJECTION, SOLUTION INTRAVENOUS at 09:06

## 2019-04-20 RX ADMIN — Medication 10 ML: at 19:39

## 2019-04-20 RX ADMIN — KETOROLAC TROMETHAMINE 30 MG: 30 INJECTION, SOLUTION INTRAMUSCULAR; INTRAVENOUS at 17:02

## 2019-04-20 RX ADMIN — FAMOTIDINE 20 MG: 10 INJECTION, SOLUTION INTRAVENOUS at 19:38

## 2019-04-20 RX ADMIN — KETOROLAC TROMETHAMINE 30 MG: 30 INJECTION, SOLUTION INTRAMUSCULAR; INTRAVENOUS at 10:43

## 2019-04-20 RX ADMIN — ENOXAPARIN SODIUM 40 MG: 40 INJECTION SUBCUTANEOUS at 09:06

## 2019-04-20 RX ADMIN — OXYCODONE AND ACETAMINOPHEN 2 TABLET: 5; 325 TABLET ORAL at 13:16

## 2019-04-20 RX ADMIN — ALUMINUM HYDROXIDE, MAGNESIUM HYDROXIDE, AND SIMETHICONE 30 ML: 200; 200; 20 SUSPENSION ORAL at 09:06

## 2019-04-20 RX ADMIN — SODIUM CHLORIDE, PRESERVATIVE FREE 10 ML: 5 INJECTION INTRAVENOUS at 04:08

## 2019-04-20 RX ADMIN — OXYCODONE AND ACETAMINOPHEN 2 TABLET: 5; 325 TABLET ORAL at 04:52

## 2019-04-20 ASSESSMENT — PAIN DESCRIPTION - PROGRESSION
CLINICAL_PROGRESSION: NOT CHANGED

## 2019-04-20 ASSESSMENT — PAIN SCALES - GENERAL
PAINLEVEL_OUTOF10: 0
PAINLEVEL_OUTOF10: 9
PAINLEVEL_OUTOF10: 7
PAINLEVEL_OUTOF10: 9
PAINLEVEL_OUTOF10: 10
PAINLEVEL_OUTOF10: 7

## 2019-04-20 NOTE — PROGRESS NOTES
Assessment completed and charted. Pt resting in bed. VSS. Pain controlled with medication. Call light within reach. Bed locked and in lowest position. Bedside table within reach. Pt denies needs at this time. No s/s of acute distress. Will continue to monitor.

## 2019-04-20 NOTE — PROGRESS NOTES
Pt ambulated around the floor x1 during shift. Advanced diet per order and pt request.  Will continue to monitor.

## 2019-04-20 NOTE — PROGRESS NOTES
Patient is alert and oriented;  VSS;  Simethicone given for gas pain; Shift assessment completed; Bed locked and in lowest position. Bedside table and call light within reach; Pt denies further needs. Will continue to monitor.

## 2019-04-20 NOTE — PLAN OF CARE
Problem: Pain:  Goal: Pain level will decrease  Pt instructed on pain scale.  Will notify RN of any changed in pain level and will provide appropriate interventions

## 2019-04-20 NOTE — PLAN OF CARE
Pt a/o, rates pain appropriately using 0-10 pain scale; pt calls out as needed for pain intervention; will continue to monitor and administer intervention as ordered and requested. Niya Villarreal

## 2019-04-21 VITALS
HEIGHT: 68 IN | TEMPERATURE: 98.2 F | BODY MASS INDEX: 24.37 KG/M2 | OXYGEN SATURATION: 96 % | DIASTOLIC BLOOD PRESSURE: 91 MMHG | RESPIRATION RATE: 16 BRPM | SYSTOLIC BLOOD PRESSURE: 155 MMHG | HEART RATE: 71 BPM | WEIGHT: 160.8 LBS

## 2019-04-21 PROCEDURE — 6370000000 HC RX 637 (ALT 250 FOR IP): Performed by: SURGERY

## 2019-04-21 PROCEDURE — 99024 POSTOP FOLLOW-UP VISIT: CPT | Performed by: SURGERY

## 2019-04-21 RX ORDER — OXYCODONE HYDROCHLORIDE AND ACETAMINOPHEN 5; 325 MG/1; MG/1
1 TABLET ORAL EVERY 6 HOURS PRN
Qty: 28 TABLET | Refills: 0 | Status: ON HOLD | OUTPATIENT
Start: 2019-04-21 | End: 2019-04-29

## 2019-04-21 RX ADMIN — OXYCODONE AND ACETAMINOPHEN 2 TABLET: 5; 325 TABLET ORAL at 03:22

## 2019-04-21 RX ADMIN — OXYCODONE AND ACETAMINOPHEN 2 TABLET: 5; 325 TABLET ORAL at 09:23

## 2019-04-21 RX ADMIN — ALUMINUM HYDROXIDE, MAGNESIUM HYDROXIDE, AND SIMETHICONE 30 ML: 200; 200; 20 SUSPENSION ORAL at 09:28

## 2019-04-21 ASSESSMENT — PAIN SCALES - GENERAL
PAINLEVEL_OUTOF10: 10
PAINLEVEL_OUTOF10: 9

## 2019-04-21 NOTE — DISCHARGE SUMMARY
Surgery Discharge Summary    Patient Identification  Campbell Gamez is a 48 y.o. male. :  1965  Admit Date:  2019    Discharge date:   No discharge date for patient encounter. Disposition: home    Discharge Diagnoses: Active Problems:    Colon cancer ([facility] [address]    Carcin[facility] ([facility]    Moderate malnutrition ([facility]  Resolved Problems:    * No resolved hospital problems. *      Discharge condition: good    Discharge Medications:     Current Discharge Medication List      CONTINUE these medications which have NOT CHANGED    Details   omeprazole (PRILOSEC) 40 MG delayed release capsule Take 1 capsule by mouth every morning (before breakfast)  Qty: 30 capsule, Refills: 2      vitamin D (ERGOCALCIFEROL) 12270 units CAPS capsule Take 1 capsule by mouth once a week  Qty: 12 capsule, Refills: 0                Current Discharge Medication List      START taking these medications    Details   oxyCODONE-acetaminophen (PERCOCET) 5-325 MG per tablet Take 1 tablet by mouth every 6 hours as needed for Pain for up to 7 days. Intended supply: 7 days. Take lowest dose possible to manage pain  Qty: 28 tablet, Refills: 0    Comments: Reduce doses taken as pain becomes manageable  Associated Diagnoses: Carcinoma of ascending colon ([facility]               Most Recent Labs:    CBC: No results for input(s): WBC, HGB, HCT, PLT in the last 72 hours. BMP:  No results for input(s): NA, K, CL, CO2, BUN, CREATININE, GLUCOSE in the last 72 hours. Hepatic: No results for input(s): AST, ALT, ALB, BILITOT, ALKPHOS in the last 72 hours. PT/INR:  No results for input(s): INR in the last 72 hours. Consults: hematology/oncology    Surgery: Robotic right colectomy    Patient Instructions: Activity: no heavy lifting, pushing, pulling for 6 weeks, no driving for 2 weeks or while on analgesics  Diet: As tolerated  Follow-up with Dr. Coral Cuenca in 2 weeks.     The patient and/or family/patient representatives, were provided education regarding discharge instructions, ongoing treatment and follow-up. Details of information given to the patient may be found in the discharge instructions located in the EMR. HPI and Hospital Course:   Admitted with anemia from ascending colon cancer. Had robotic resection.  Home today, POD#5      [address] [facility]

## 2019-04-21 NOTE — PROGRESS NOTES
Patient is alert and oriented;  VSS;  Pt ambulating in the halls; Taking only oral meds. Shift assessment completed; Bed locked and in lowest position. Bedside table and call light within reach; Pt denies further needs. Will continue to monitor.

## 2019-04-21 NOTE — PROGRESS NOTES
Patient assessment complete and documented. VSS. A&O x4. Patient with complaints of abd pain. Given PRN pain medication (see MAR). Patient denies any further needs at this time.  Will continue to monitor

## 2019-04-21 NOTE — PROGRESS NOTES
Patient lost IV access tonight. Perfectserved Dr. Randa Zuniga and he is fine with leaving IV out. Will continue to monitor.

## 2019-04-22 ENCOUNTER — TELEPHONE (OUTPATIENT)
Dept: SURGERY | Age: 54
End: 2019-04-22

## 2019-04-22 NOTE — TELEPHONE ENCOUNTER
Pt is calling to ask questions about his meds since surgery. He is needing to take more. The current dose is not working.

## 2019-04-25 ENCOUNTER — TELEPHONE (OUTPATIENT)
Dept: SURGERY | Age: 54
End: 2019-04-25

## 2019-04-25 DIAGNOSIS — Z90.49 S/P PARTIAL COLECTOMY: Primary | ICD-10-CM

## 2019-04-25 RX ORDER — OXYCODONE HYDROCHLORIDE AND ACETAMINOPHEN 5; 325 MG/1; MG/1
1-2 TABLET ORAL EVERY 6 HOURS PRN
Qty: 20 TABLET | Refills: 0 | Status: ON HOLD | OUTPATIENT
Start: 2019-04-25 | End: 2019-04-29 | Stop reason: HOSPADM

## 2019-04-26 ENCOUNTER — HOSPITAL ENCOUNTER (OUTPATIENT)
Dept: CT IMAGING | Age: 54
Discharge: HOME | DRG: 862 | End: 2019-04-26
Payer: COMMERCIAL

## 2019-04-26 ENCOUNTER — TELEPHONE (OUTPATIENT)
Dept: SURGERY | Age: 54
End: 2019-04-26

## 2019-04-26 ENCOUNTER — HOSPITAL ENCOUNTER (INPATIENT)
Age: 54
LOS: 3 days | Discharge: HOME | DRG: 862 | End: 2019-04-29
Attending: EMERGENCY MEDICINE | Admitting: SURGERY
Payer: COMMERCIAL

## 2019-04-26 DIAGNOSIS — C18.2 CARCINOMA OF ASCENDING COLON (HCC): ICD-10-CM

## 2019-04-26 DIAGNOSIS — C18.2 CANCER OF ASCENDING COLON (HCC): ICD-10-CM

## 2019-04-26 DIAGNOSIS — R50.9 FEVER CHILLS: ICD-10-CM

## 2019-04-26 DIAGNOSIS — C18.2 CANCER OF ASCENDING COLON (HCC): Primary | ICD-10-CM

## 2019-04-26 DIAGNOSIS — T81.43XA POSTPROCEDURAL INTRAABDOMINAL ABSCESS: Primary | ICD-10-CM

## 2019-04-26 PROBLEM — K65.1 INTRA-ABDOMINAL ABSCESS (HCC): Status: ACTIVE | Noted: 2019-04-26

## 2019-04-26 LAB
A/G RATIO: 1.1 (ref 1.1–2.2)
ALBUMIN SERPL-MCNC: 3.8 G/DL (ref 3.4–5)
ALP BLD-CCNC: 144 U/L (ref 40–129)
ALT SERPL-CCNC: 14 U/L (ref 10–40)
ANION GAP SERPL CALCULATED.3IONS-SCNC: 17 MMOL/L (ref 3–16)
APTT: 35.4 SEC (ref 26–36)
AST SERPL-CCNC: 12 U/L (ref 15–37)
BASOPHILS ABSOLUTE: 0.1 K/UL (ref 0–0.2)
BASOPHILS RELATIVE PERCENT: 0.4 %
BILIRUB SERPL-MCNC: 0.4 MG/DL (ref 0–1)
BUN BLDV-MCNC: 12 MG/DL (ref 7–20)
CALCIUM SERPL-MCNC: 9 MG/DL (ref 8.3–10.6)
CHLORIDE BLD-SCNC: 96 MMOL/L (ref 99–110)
CO2: 25 MMOL/L (ref 21–32)
CREAT SERPL-MCNC: 0.8 MG/DL (ref 0.9–1.3)
EOSINOPHILS ABSOLUTE: 0 K/UL (ref 0–0.6)
EOSINOPHILS RELATIVE PERCENT: 0.2 %
GFR AFRICAN AMERICAN: >60
GFR NON-AFRICAN AMERICAN: >60
GLOBULIN: 3.6 G/DL
GLUCOSE BLD-MCNC: 118 MG/DL (ref 70–99)
HCT VFR BLD CALC: 30.3 % (ref 40.5–52.5)
HEMATOLOGY PATH CONSULT: NO
HEMOGLOBIN: 9.5 G/DL (ref 13.5–17.5)
INR BLD: 1.39 (ref 0.86–1.14)
LACTIC ACID, SEPSIS: 0.8 MMOL/L (ref 0.4–1.9)
LYMPHOCYTES ABSOLUTE: 0.8 K/UL (ref 1–5.1)
LYMPHOCYTES RELATIVE PERCENT: 6.3 %
MCH RBC QN AUTO: 21.8 PG (ref 26–34)
MCHC RBC AUTO-ENTMCNC: 31.4 G/DL (ref 31–36)
MCV RBC AUTO: 69.5 FL (ref 80–100)
MONOCYTES ABSOLUTE: 1.3 K/UL (ref 0–1.3)
MONOCYTES RELATIVE PERCENT: 10.3 %
NEUTROPHILS ABSOLUTE: 10.6 K/UL (ref 1.7–7.7)
NEUTROPHILS RELATIVE PERCENT: 82.8 %
PDW BLD-RTO: 35.8 % (ref 12.4–15.4)
PLATELET # BLD: 434 K/UL (ref 135–450)
PMV BLD AUTO: 8.3 FL (ref 5–10.5)
POTASSIUM SERPL-SCNC: 4.7 MMOL/L (ref 3.5–5.1)
PROTHROMBIN TIME: 15.8 SEC (ref 9.8–13)
RBC # BLD: 4.36 M/UL (ref 4.2–5.9)
SODIUM BLD-SCNC: 138 MMOL/L (ref 136–145)
TOTAL PROTEIN: 7.4 G/DL (ref 6.4–8.2)
WBC # BLD: 12.8 K/UL (ref 4–11)

## 2019-04-26 PROCEDURE — 6360000004 HC RX CONTRAST MEDICATION: Performed by: SURGERY

## 2019-04-26 PROCEDURE — 83605 ASSAY OF LACTIC ACID: CPT

## 2019-04-26 PROCEDURE — 2580000003 HC RX 258: Performed by: SURGERY

## 2019-04-26 PROCEDURE — 85025 COMPLETE CBC W/AUTO DIFF WBC: CPT

## 2019-04-26 PROCEDURE — 85610 PROTHROMBIN TIME: CPT

## 2019-04-26 PROCEDURE — 6360000002 HC RX W HCPCS: Performed by: EMERGENCY MEDICINE

## 2019-04-26 PROCEDURE — 80053 COMPREHEN METABOLIC PANEL: CPT

## 2019-04-26 PROCEDURE — 96375 TX/PRO/DX INJ NEW DRUG ADDON: CPT

## 2019-04-26 PROCEDURE — 2580000003 HC RX 258: Performed by: EMERGENCY MEDICINE

## 2019-04-26 PROCEDURE — 6370000000 HC RX 637 (ALT 250 FOR IP): Performed by: SURGERY

## 2019-04-26 PROCEDURE — 85730 THROMBOPLASTIN TIME PARTIAL: CPT

## 2019-04-26 PROCEDURE — 87040 BLOOD CULTURE FOR BACTERIA: CPT

## 2019-04-26 PROCEDURE — 1200000000 HC SEMI PRIVATE

## 2019-04-26 PROCEDURE — 99284 EMERGENCY DEPT VISIT MOD MDM: CPT

## 2019-04-26 PROCEDURE — 74177 CT ABD & PELVIS W/CONTRAST: CPT

## 2019-04-26 PROCEDURE — 96374 THER/PROPH/DIAG INJ IV PUSH: CPT

## 2019-04-26 RX ORDER — FAMOTIDINE 20 MG/1
20 TABLET, FILM COATED ORAL 2 TIMES DAILY
Status: DISCONTINUED | OUTPATIENT
Start: 2019-04-26 | End: 2019-04-29 | Stop reason: HOSPADM

## 2019-04-26 RX ORDER — MORPHINE SULFATE 4 MG/ML
4 INJECTION, SOLUTION INTRAMUSCULAR; INTRAVENOUS ONCE
Status: COMPLETED | OUTPATIENT
Start: 2019-04-26 | End: 2019-04-26

## 2019-04-26 RX ORDER — ONDANSETRON 2 MG/ML
4 INJECTION INTRAMUSCULAR; INTRAVENOUS ONCE
Status: COMPLETED | OUTPATIENT
Start: 2019-04-26 | End: 2019-04-26

## 2019-04-26 RX ORDER — ONDANSETRON 2 MG/ML
4 INJECTION INTRAMUSCULAR; INTRAVENOUS EVERY 6 HOURS PRN
Status: DISCONTINUED | OUTPATIENT
Start: 2019-04-26 | End: 2019-04-29 | Stop reason: HOSPADM

## 2019-04-26 RX ORDER — 0.9 % SODIUM CHLORIDE 0.9 %
1000 INTRAVENOUS SOLUTION INTRAVENOUS ONCE
Status: COMPLETED | OUTPATIENT
Start: 2019-04-26 | End: 2019-04-26

## 2019-04-26 RX ORDER — SODIUM CHLORIDE 0.9 % (FLUSH) 0.9 %
10 SYRINGE (ML) INJECTION EVERY 12 HOURS SCHEDULED
Status: DISCONTINUED | OUTPATIENT
Start: 2019-04-26 | End: 2019-04-29 | Stop reason: HOSPADM

## 2019-04-26 RX ORDER — SODIUM CHLORIDE 0.9 % (FLUSH) 0.9 %
10 SYRINGE (ML) INJECTION PRN
Status: DISCONTINUED | OUTPATIENT
Start: 2019-04-26 | End: 2019-04-29 | Stop reason: HOSPADM

## 2019-04-26 RX ORDER — OXYCODONE HYDROCHLORIDE AND ACETAMINOPHEN 5; 325 MG/1; MG/1
2 TABLET ORAL EVERY 4 HOURS PRN
Status: DISCONTINUED | OUTPATIENT
Start: 2019-04-26 | End: 2019-04-29 | Stop reason: HOSPADM

## 2019-04-26 RX ORDER — SODIUM CHLORIDE 9 MG/ML
INJECTION, SOLUTION INTRAVENOUS CONTINUOUS
Status: DISCONTINUED | OUTPATIENT
Start: 2019-04-26 | End: 2019-04-27

## 2019-04-26 RX ORDER — OXYCODONE HYDROCHLORIDE AND ACETAMINOPHEN 5; 325 MG/1; MG/1
1 TABLET ORAL EVERY 4 HOURS PRN
Status: DISCONTINUED | OUTPATIENT
Start: 2019-04-26 | End: 2019-04-29 | Stop reason: HOSPADM

## 2019-04-26 RX ADMIN — OXYCODONE HYDROCHLORIDE AND ACETAMINOPHEN 2 TABLET: 5; 325 TABLET ORAL at 22:38

## 2019-04-26 RX ADMIN — SODIUM CHLORIDE: 9 INJECTION, SOLUTION INTRAVENOUS at 22:35

## 2019-04-26 RX ADMIN — FAMOTIDINE 20 MG: 20 TABLET ORAL at 22:38

## 2019-04-26 RX ADMIN — PIPERACILLIN SODIUM,TAZOBACTAM SODIUM 3.38 G: 3; .375 INJECTION, POWDER, FOR SOLUTION INTRAVENOUS at 20:00

## 2019-04-26 RX ADMIN — IOHEXOL 50 ML: 240 INJECTION, SOLUTION INTRATHECAL; INTRAVASCULAR; INTRAVENOUS; ORAL at 18:00

## 2019-04-26 RX ADMIN — ONDANSETRON 4 MG: 2 INJECTION INTRAMUSCULAR; INTRAVENOUS at 20:00

## 2019-04-26 RX ADMIN — SODIUM CHLORIDE 1000 ML: 9 INJECTION, SOLUTION INTRAVENOUS at 20:00

## 2019-04-26 RX ADMIN — MORPHINE SULFATE 4 MG: 4 INJECTION INTRAVENOUS at 20:00

## 2019-04-26 RX ADMIN — IOPAMIDOL 75 ML: 755 INJECTION, SOLUTION INTRAVENOUS at 18:02

## 2019-04-26 ASSESSMENT — PAIN SCALES - GENERAL
PAINLEVEL_OUTOF10: 9
PAINLEVEL_OUTOF10: 7
PAINLEVEL_OUTOF10: 9
PAINLEVEL_OUTOF10: 4

## 2019-04-26 ASSESSMENT — PAIN DESCRIPTION - LOCATION
LOCATION: ABDOMEN
LOCATION: ABDOMEN

## 2019-04-26 ASSESSMENT — PAIN DESCRIPTION - PAIN TYPE
TYPE: ACUTE PAIN
TYPE: ACUTE PAIN

## 2019-04-26 NOTE — PROGRESS NOTES
IR Attending    Discussed RD with Dr. Rosangela Pelayo     Will set up for RUQ drainage catheter placement tomorrow AM    I have ordered coags    Please reach out to IR with any questions

## 2019-04-26 NOTE — TELEPHONE ENCOUNTER
Patient's temp is over 101, he still has chills. Dr. Rosangela Pelayo ordered stat CT. He will go over to [facility] now.

## 2019-04-26 NOTE — ED NOTES
Bed: 12  Expected date:   Expected time:   Means of arrival:   Comments:  Patient from [address] [place] [address], RN  04/26/19 8323

## 2019-04-26 NOTE — ED PROVIDER NOTES
CHIEF COMPLAINT  Fever (had abdominal CT today which showed abscess. Had abdominal surgery 4/16 for CA )      HISTORY OF PRESENT ILLNESS  Marilyn Isaacs is a 48 y.o. male with a history of colon cancer status post partial colectomy by Dr. Joie Fenton last week. Patient states that he has had increasing fevers and abdominal pain over the last several days. An outpatient CT was obtained by Dr. Joie Fenton showing an intra-abdominal abscess. Patient was sent to the emergency department for labs and admission. No other complaints, modifying factors or associated symptoms. I have reviewed the following from the nursing documentation.     Past Medical History:   Diagnosis Date    GERD (gastroesophageal reflux disease)     resolved    Malignant neoplasm of ascending colon ([facility] [address] 4/9/2019     Past Surgical History:   Procedure Laterality Date    ABDOMEN SURGERY      APPENDECTOMY      COLONOSCOPY N/A 4/4/2019    COLONOSCOPY WITH BIOPSY performed by Avel Buchanan MD at [facility] [address]  4/4/2019    COLONOSCOPY POLYPECTOMY SNARE/COLD BIOPSY performed by Avel Buchanan MD at [address] Right 4/16/2019    ROBOTIC LAPAROSCOPIC RIGHT COLECTOMY CPT CODE - 68707 performed by Angela Dwyer MD at [address] N/A 4/4/2019    EGD performed by Avel Buchanan MD at [facility] History   Problem Relation Age of Onset    Other Mother         diverticulitis    Diabetes Father     Cancer Father         unsure     Social History     Socioeconomic History    Marital status:      Spouse name: Not on file    Number of children: Not on file    Years of education: Not on file    Highest education level: Not on file   Occupational History    Not on file   Social Needs    Financial resource strain: Not on file    Food insecurity:     Worry: Not on file     Inability: Not on file   [facility] Transportation needs:     Medical: Not on file     Non-medical: Not on file   Tobacco Use    Smoking status: Never Smoker    Smokeless tobacco: Never Used   Substance and Sexual Activity    Alcohol use: Not Currently     Frequency: Never    Drug use: Never    Sexual activity: Not on file   Lifestyle    Physical activity:     Days per week: Not on file     Minutes per session: Not on file    Stress: Not on file   Relationships    Social connections:     Talks on phone: Not on file     Gets together: Not on file     Attends Oriental orthodox service: Not on file     Active member of club or organization: Not on file     Attends meetings of clubs or organizations: Not on file     Relationship status: Not on file    Intimate partner violence:     Fear of current or ex partner: Not on file     Emotionally abused: Not on file     Physically abused: Not on file     Forced sexual activity: Not on file   Other Topics Concern    Not on file   Social History Narrative    Not on file     Current Facility-Administered Medications   Medication Dose Route Frequency Provider Last Rate Last Dose    piperacillin-tazobactam (ZOSYN) 3.375 g in dextrose 5 % 50 mL IVPB (mini-bag)  3.375 g Intravenous Once Aníbal Pay, DO        [facility]etron [facility] injection 4 mg  4 mg Intravenous Once Aníbal Pay, DO        0.9 % sodium chloride bolus  1,000 mL Intravenous Once Aníbal Pay, DO        morphine injection 4 mg  4 mg Intravenous Once Aníbal Pay, DO         Current Outpatient Medications   Medication Sig Dispense Refill    oxyCODONE-acetaminophen (PERCOCET) 5-325 MG per tablet Take 1 tablet by mouth every 6 hours as needed for Pain for up to 7 days. Intended supply: 7 days.  Take lowest dose possible to manage pain 28 tablet 0    omeprazole (PRILOSEC) 40 MG delayed release capsule Take 1 capsule by mouth every morning (before breakfast) 30 capsule 2    oxyCODONE-acetaminophen (PERCOCET) 5-325 MG per tablet Take 1-2 tablets by mouth every 6 hours as needed for Pain for up to 5 days. Intended supply: 5 days. Take lowest dose possible to manage pain 20 tablet 0    vitamin D (ERGOCALCIFEROL) 52449 units CAPS capsule Take 1 capsule by mouth once a week 12 capsule 0     Allergies   Allergen Reactions    Prednisone Nausea And Vomiting       REVIEW OF SYSTEMS  10 systems reviewed, pertinent positives per HPI otherwise noted to be negative. PHYSICAL EXAM  BP (!) 144/87   Pulse 96   Temp 102.7 °F (39.3 °C) (Oral)   Resp 18   Ht 5' 8\" (1.727 m)   Wt 168 lb (76.2 kg)   SpO2 97%   BMI 25.54 kg/m²   GENERAL APPEARANCE: Awake and alert. Cooperative. No acute distress. HEAD: Normocephalic. Atraumatic. EYES: PERRL. EOM's grossly intact. ENT: Mucous membranes are moist.   NECK: Supple, trachea midline. HEART: RRR. Normal S1S2, no rubs, gallops, or murmurs noted  LUNGS: Respirations unlabored. CTAB. Good air exchange. No wheezes, rales, or rhonchi. Speaking comfortably in full sentences. ABDOMEN: Soft. Diffuse abdominal tenderness to palpation. Positive guarding and rebound tenderness. Postoperative wounds appear clean, dry, and intact. Decreased bowel sounds throughout. EXTREMITIES: No peripheral edema. MAEE. No acute deformities. SKIN: Warm and dry. No acute rashes. NEUROLOGICAL: Alert and oriented X 3. CN II-XII intact. No gross facial drooping. Strength 5/5, sensation intact. Normal coordination. No pronator drift. Gait normal.   PSYCHIATRIC: Normal mood and affect. LABS  I have reviewed all labs for this visit.    Results for orders placed or performed during the hospital encounter of 04/26/19   CBC Auto Differential   Result Value Ref Range    WBC 12.8 (H) 4.0 - 11.0 K/uL    RBC 4.36 4.20 - 5.90 M/uL    Hemoglobin 9.5 (L) 13.5 - 17.5 g/dL    Hematocrit 30.3 (L) 40.5 - 52.5 %    MCV 69.5 (L) 80.0 - 100.0 fL    MCH 21.8 (L) 26.0 - 34.0 pg    MCHC 31.4 31.0 - 36.0 g/dL    RDW 35.8 (H) 12.4 - 15.4 %    Platelets 112 581 temperature 102.7 °F (39.3 °C), temperature source Oral, resp. rate 18, height 5' 8\" (1.727 m), weight 168 lb (76.2 kg), SpO2 97 %. Buster Edwards was admitted in stable condition.         Radha Ji DO  04/26/19 6527

## 2019-04-27 ENCOUNTER — APPOINTMENT (OUTPATIENT)
Dept: CT IMAGING | Age: 54
DRG: 862 | End: 2019-04-27
Payer: COMMERCIAL

## 2019-04-27 PROBLEM — T81.43XA POSTPROCEDURAL INTRAABDOMINAL ABSCESS: Status: ACTIVE | Noted: 2019-04-26

## 2019-04-27 LAB
ANION GAP SERPL CALCULATED.3IONS-SCNC: 10 MMOL/L (ref 3–16)
BASOPHILS ABSOLUTE: 0.1 K/UL (ref 0–0.2)
BASOPHILS RELATIVE PERCENT: 0.7 %
BUN BLDV-MCNC: 10 MG/DL (ref 7–20)
CALCIUM SERPL-MCNC: 8.7 MG/DL (ref 8.3–10.6)
CHLORIDE BLD-SCNC: 105 MMOL/L (ref 99–110)
CO2: 27 MMOL/L (ref 21–32)
CREAT SERPL-MCNC: 0.7 MG/DL (ref 0.9–1.3)
EOSINOPHILS ABSOLUTE: 0.1 K/UL (ref 0–0.6)
EOSINOPHILS RELATIVE PERCENT: 1.4 %
GFR AFRICAN AMERICAN: >60
GFR NON-AFRICAN AMERICAN: >60
GLUCOSE BLD-MCNC: 94 MG/DL (ref 70–99)
HCT VFR BLD CALC: 29.3 % (ref 40.5–52.5)
HEMOGLOBIN: 9 G/DL (ref 13.5–17.5)
INR BLD: 1.34 (ref 0.86–1.14)
LYMPHOCYTES ABSOLUTE: 1 K/UL (ref 1–5.1)
LYMPHOCYTES RELATIVE PERCENT: 12.7 %
MCH RBC QN AUTO: 21.6 PG (ref 26–34)
MCHC RBC AUTO-ENTMCNC: 30.7 G/DL (ref 31–36)
MCV RBC AUTO: 70.3 FL (ref 80–100)
MONOCYTES ABSOLUTE: 1.1 K/UL (ref 0–1.3)
MONOCYTES RELATIVE PERCENT: 13.7 %
NEUTROPHILS ABSOLUTE: 5.7 K/UL (ref 1.7–7.7)
NEUTROPHILS RELATIVE PERCENT: 71.5 %
PDW BLD-RTO: 35.9 % (ref 12.4–15.4)
PLATELET # BLD: 383 K/UL (ref 135–450)
PMV BLD AUTO: 8.4 FL (ref 5–10.5)
POTASSIUM SERPL-SCNC: 4.6 MMOL/L (ref 3.5–5.1)
PROTHROMBIN TIME: 15.3 SEC (ref 9.8–13)
RBC # BLD: 4.17 M/UL (ref 4.2–5.9)
SODIUM BLD-SCNC: 142 MMOL/L (ref 136–145)
WBC # BLD: 8 K/UL (ref 4–11)

## 2019-04-27 PROCEDURE — 6360000002 HC RX W HCPCS: Performed by: STUDENT IN AN ORGANIZED HEALTH CARE EDUCATION/TRAINING PROGRAM

## 2019-04-27 PROCEDURE — 36415 COLL VENOUS BLD VENIPUNCTURE: CPT

## 2019-04-27 PROCEDURE — 99024 POSTOP FOLLOW-UP VISIT: CPT | Performed by: SURGERY

## 2019-04-27 PROCEDURE — 80048 BASIC METABOLIC PNL TOTAL CA: CPT

## 2019-04-27 PROCEDURE — 87205 SMEAR GRAM STAIN: CPT

## 2019-04-27 PROCEDURE — 94761 N-INVAS EAR/PLS OXIMETRY MLT: CPT

## 2019-04-27 PROCEDURE — 1200000000 HC SEMI PRIVATE

## 2019-04-27 PROCEDURE — 2709999900 CT ABSCESS DRAINAGE W CATH PLACEMENT S&I

## 2019-04-27 PROCEDURE — 87077 CULTURE AEROBIC IDENTIFY: CPT

## 2019-04-27 PROCEDURE — 0W9G30Z DRAINAGE OF PERITONEAL CAVITY WITH DRAINAGE DEVICE, PERCUTANEOUS APPROACH: ICD-10-PCS | Performed by: RADIOLOGY

## 2019-04-27 PROCEDURE — 6360000002 HC RX W HCPCS: Performed by: SURGERY

## 2019-04-27 PROCEDURE — 87070 CULTURE OTHR SPECIMN AEROBIC: CPT

## 2019-04-27 PROCEDURE — 85610 PROTHROMBIN TIME: CPT

## 2019-04-27 PROCEDURE — 6370000000 HC RX 637 (ALT 250 FOR IP): Performed by: SURGERY

## 2019-04-27 PROCEDURE — 2580000003 HC RX 258: Performed by: SURGERY

## 2019-04-27 PROCEDURE — 87186 SC STD MICRODIL/AGAR DIL: CPT

## 2019-04-27 PROCEDURE — 2580000003 HC RX 258: Performed by: STUDENT IN AN ORGANIZED HEALTH CARE EDUCATION/TRAINING PROGRAM

## 2019-04-27 PROCEDURE — 2700000000 HC OXYGEN THERAPY PER DAY

## 2019-04-27 PROCEDURE — 6360000002 HC RX W HCPCS: Performed by: RADIOLOGY

## 2019-04-27 PROCEDURE — 85025 COMPLETE CBC W/AUTO DIFF WBC: CPT

## 2019-04-27 RX ORDER — MIDAZOLAM HYDROCHLORIDE 5 MG/ML
INJECTION INTRAMUSCULAR; INTRAVENOUS
Status: COMPLETED | OUTPATIENT
Start: 2019-04-27 | End: 2019-04-27

## 2019-04-27 RX ORDER — FENTANYL CITRATE 50 UG/ML
INJECTION, SOLUTION INTRAMUSCULAR; INTRAVENOUS
Status: COMPLETED | OUTPATIENT
Start: 2019-04-27 | End: 2019-04-27

## 2019-04-27 RX ORDER — KETOROLAC TROMETHAMINE 30 MG/ML
30 INJECTION, SOLUTION INTRAMUSCULAR; INTRAVENOUS EVERY 6 HOURS PRN
Status: DISCONTINUED | OUTPATIENT
Start: 2019-04-27 | End: 2019-04-28

## 2019-04-27 RX ORDER — SODIUM CHLORIDE, SODIUM LACTATE, POTASSIUM CHLORIDE, CALCIUM CHLORIDE 600; 310; 30; 20 MG/100ML; MG/100ML; MG/100ML; MG/100ML
INJECTION, SOLUTION INTRAVENOUS CONTINUOUS
Status: DISCONTINUED | OUTPATIENT
Start: 2019-04-27 | End: 2019-04-28

## 2019-04-27 RX ADMIN — KETOROLAC TROMETHAMINE 30 MG: 30 INJECTION, SOLUTION INTRAMUSCULAR at 22:56

## 2019-04-27 RX ADMIN — OXYCODONE HYDROCHLORIDE AND ACETAMINOPHEN 2 TABLET: 5; 325 TABLET ORAL at 19:48

## 2019-04-27 RX ADMIN — OXYCODONE HYDROCHLORIDE AND ACETAMINOPHEN 2 TABLET: 5; 325 TABLET ORAL at 15:02

## 2019-04-27 RX ADMIN — PIPERACILLIN SODIUM,TAZOBACTAM SODIUM 3.38 G: 3; .375 INJECTION, POWDER, FOR SOLUTION INTRAVENOUS at 02:06

## 2019-04-27 RX ADMIN — ENOXAPARIN SODIUM 40 MG: 40 INJECTION SUBCUTANEOUS at 13:39

## 2019-04-27 RX ADMIN — MIDAZOLAM HYDROCHLORIDE 0.5 MG: 5 INJECTION INTRAMUSCULAR; INTRAVENOUS at 09:35

## 2019-04-27 RX ADMIN — HYDROMORPHONE HYDROCHLORIDE 1 MG: 1 INJECTION, SOLUTION INTRAMUSCULAR; INTRAVENOUS; SUBCUTANEOUS at 10:29

## 2019-04-27 RX ADMIN — PIPERACILLIN SODIUM,TAZOBACTAM SODIUM 3.38 G: 3; .375 INJECTION, POWDER, FOR SOLUTION INTRAVENOUS at 17:58

## 2019-04-27 RX ADMIN — HYDROMORPHONE HYDROCHLORIDE 1 MG: 1 INJECTION, SOLUTION INTRAMUSCULAR; INTRAVENOUS; SUBCUTANEOUS at 13:39

## 2019-04-27 RX ADMIN — HYDROMORPHONE HYDROCHLORIDE 1 MG: 1 INJECTION, SOLUTION INTRAMUSCULAR; INTRAVENOUS; SUBCUTANEOUS at 16:17

## 2019-04-27 RX ADMIN — KETOROLAC TROMETHAMINE 30 MG: 30 INJECTION, SOLUTION INTRAMUSCULAR at 16:17

## 2019-04-27 RX ADMIN — MIDAZOLAM HYDROCHLORIDE 1 MG: 5 INJECTION INTRAMUSCULAR; INTRAVENOUS at 09:24

## 2019-04-27 RX ADMIN — PIPERACILLIN SODIUM,TAZOBACTAM SODIUM 3.38 G: 3; .375 INJECTION, POWDER, FOR SOLUTION INTRAVENOUS at 10:08

## 2019-04-27 RX ADMIN — SODIUM CHLORIDE, POTASSIUM CHLORIDE, SODIUM LACTATE AND CALCIUM CHLORIDE: 600; 310; 30; 20 INJECTION, SOLUTION INTRAVENOUS at 07:49

## 2019-04-27 RX ADMIN — OXYCODONE HYDROCHLORIDE AND ACETAMINOPHEN 1 TABLET: 5; 325 TABLET ORAL at 06:11

## 2019-04-27 RX ADMIN — FENTANYL CITRATE 25 MCG: 50 INJECTION INTRAMUSCULAR; INTRAVENOUS at 09:35

## 2019-04-27 RX ADMIN — FENTANYL CITRATE 25 MCG: 50 INJECTION INTRAMUSCULAR; INTRAVENOUS at 09:42

## 2019-04-27 RX ADMIN — FENTANYL CITRATE 50 MCG: 50 INJECTION INTRAMUSCULAR; INTRAVENOUS at 09:24

## 2019-04-27 RX ADMIN — FAMOTIDINE 20 MG: 20 TABLET ORAL at 19:48

## 2019-04-27 RX ADMIN — MIDAZOLAM HYDROCHLORIDE 0.5 MG: 5 INJECTION INTRAMUSCULAR; INTRAVENOUS at 09:42

## 2019-04-27 ASSESSMENT — PAIN DESCRIPTION - PAIN TYPE
TYPE: SURGICAL PAIN
TYPE: ACUTE PAIN
TYPE: SURGICAL PAIN

## 2019-04-27 ASSESSMENT — PAIN DESCRIPTION - LOCATION
LOCATION: ABDOMEN

## 2019-04-27 ASSESSMENT — PAIN SCALES - GENERAL
PAINLEVEL_OUTOF10: 5
PAINLEVEL_OUTOF10: 10
PAINLEVEL_OUTOF10: 5
PAINLEVEL_OUTOF10: 4
PAINLEVEL_OUTOF10: 10
PAINLEVEL_OUTOF10: 3
PAINLEVEL_OUTOF10: 9
PAINLEVEL_OUTOF10: 10
PAINLEVEL_OUTOF10: 7

## 2019-04-27 NOTE — H&P
Department of General Surgery - Adult   History and Physical      PATIENT NAME: Robert Deal   YOB: 1965    ADMISSION DATE: 4/26/2019  7:03 PM      TODAY'S DATE: 4/27/2019    CHIEF COMPLAINT:  Right sided pain      HISTORY OF PRESENT ILLNESS:  The patient is a 48 y.o. male  who presents with right sided pain and fevers. Progressed over several days. No nausea. Some diarrhea. Past Medical History:        Diagnosis Date    GERD (gastroesophageal reflux disease)     resolved    Malignant neoplasm of ascending colon ([address] 4/9/2019       Past Surgical History:        Procedure Laterality Date    ABDOMEN SURGERY      APPENDECTOMY      COLONOSCOPY N/A 4/4/2019    COLONOSCOPY WITH BIOPSY performed by Yanira Donnelly MD at [facility] [address]  4/4/2019    COLONOSCOPY POLYPECTOMY SNARE/COLD BIOPSY performed by Yanira Donnelly MD at [address] [facility] [place] 4/16/2019    ROBOTIC LAPAROSCOPIC RIGHT COLECTOMY CPT CODE - 52546 performed by Moy Mckeon MD at [facility] 4/4/2019    EGD performed by Yanira Donnelly MD at [facility]       Medications Prior to Admission:   Prior to Admission medications    Medication Sig Start Date End Date Taking? Authorizing Provider   oxyCODONE-acetaminophen (PERCOCET) 5-325 MG per tablet Take 1-2 tablets by mouth every 6 hours as needed for Pain for up to 5 days. Intended supply: 5 days. Take lowest dose possible to manage pain 4/25/19 4/30/19 Yes Moy Mckeon MD   oxyCODONE-acetaminophen (PERCOCET) 5-325 MG per tablet Take 1 tablet by mouth every 6 hours as needed for Pain for up to 7 days. Intended supply: 7 days.  Take lowest dose possible to manage pain 4/21/19 4/28/19 Yes Luli Mchugh MD   omeprazole (PRILOSEC) 40 MG delayed release capsule Take 1 capsule by mouth every morning (before breakfast) 4/1/19  Yes Elizabeth Hernandez BUN 12 10   CREATININE 0.8* 0.7*   GLUCOSE 118* 94     Hepatic:   Recent Labs     04/26/19  1924   AST 12*   ALT 14   BILITOT 0.4   ALKPHOS 144*     Mag:    No results for input(s): MG in the last 72 hours. Phos:   No results for input(s): PHOS in the last 72 hours. INR:   Recent Labs     04/26/19 1924 04/27/19  0606   INR 1.39* 1.34*       Radiology Review: Images personally reviewed by me. CT -   Postsurgical changes of right colectomy. [place] is a 5 cm rim enhancing gas   and fluid collection adjacent to the anastomosis in the right upper quadrant   concerning for abscess.       The above fluid collection is immediately adjacent to the gallbladder which   is inflamed, demonstrating diffuse wall thickening. ASSESSMENT AND PLAN:  47 yo with intraabdominal abscess, s/p robotic right colectomy  1. Perc drain with IR today  2. Full liquids  3. IV abx      PRACTITIONER CERTIFICATION   I certify that Aren Camp is expected to be hospitalized for >2 days based on the above assessment and plan.       Electronically signed by Beverly Jackson, [address] [place] [address]  [id number]

## 2019-04-27 NOTE — PRE SEDATION
Sedation Pre-Procedure Note    Patient Name: Campbell Gamez   YOB: 1965  Room/Bed: 5474/1387-35  Medical Record Number: [id number]  Date: 4/27/2019   Time: 9:53 AM       Indication:  49 yo male with history of colon cancer presents with right upper quadrant abscess on CT from 4/26/19    Consent: I have discussed with the patient and/or the patient representative the indication, alternatives, and the possible risks and/or complications of the planned procedure and the anesthesia methods. The patient and/or patient representative appear to understand and agree to proceed. Vital Signs:   Vitals:    04/27/19 0940   BP: (!) 96/52   Pulse: 62   Resp: 11   Temp:    SpO2: 100%       Past Medical History:   has a past medical history of GERD (gastroesophageal reflux disease) and Malignant neoplasm of ascending colon ([facility] [address] Past Surgical History:   has a past surgical history that includes Colonoscopy (N/A, 4/4/2019); Upper gastrointestinal endoscopy (N/A, 4/4/2019); Colonoscopy (4/4/2019); hemicolectomy (Right, 4/16/2019); Abdomen surgery; and Appendectomy. Medications:   Scheduled Meds:    piperacillin-tazobactam  3.375 g Intravenous Q8H    sodium chloride flush  10 mL Intravenous 2 times per day    famotidine  20 mg Oral BID     Continuous Infusions:    lactated ringers 125 mL/hr at 04/27/19 0749     PRN Meds: sodium chloride flush, ondansetron, oxyCODONE-acetaminophen **OR** oxyCODONE-acetaminophen, HYDROmorphone  Home Meds:   Prior to Admission medications    Medication Sig Start Date End Date Taking? Authorizing Provider   oxyCODONE-acetaminophen (PERCOCET) 5-325 MG per tablet Take 1-2 tablets by mouth every 6 hours as needed for Pain for up to 5 days. Intended supply: 5 days.  Take lowest dose possible to manage pain 4/25/19 4/30/19 Yes Chrissie Osborn MD   oxyCODONE-acetaminophen (PERCOCET) 5-325 MG per tablet Take 1 tablet by mouth every 6 hours as needed for Pain for up to 7

## 2019-04-27 NOTE — CARE COORDINATION
CASE MANAGEMENT INITIAL ASSESSMENT      Reviewed chart and met with patient today, re: DCP services  Explained Case Management role/services. Family present: none  Primary contact information: Phillip Sanchez spouse 0     Admit date/status: 4/26 IP  Diagnosis: intrabdominal abcess    Insurance: [facility]  Precert required for SNF - Y      3 night stay required  N    Living arrangements, Adls, care needs, prior to admission: Pt lives with spouse and children in 2nd floor apt with 20 steps in. Pt very independent PTA, continues to drive. Is post colectomy d/c 4/21. Transportation: likely private    [address] [place] [address] at home: N/A Walker__Cane__RTS__ BSC__Shower Chair__  02__ HHN__ CPAP__  BiPap__  Hospital Bed__ W/C___ Other__________    Services in the home and/or outpatient, prior to admission: N/A    Dialysis Facility (if applicable) N/A  · Name:  · Address:  · Dialysis Schedule:  · Phone:  · Fax:    PT/OT recs: [facility] Exemption Notification (HEN): N/A    Barriers to discharge: none    Plan/comments: Pt did meet with financial last visit and has no other concerns for d/c. Did have perc drain placed this admission and feels he can manage this for the planned 2 week placement OP. Please inform DCP if any other needs arise.     ECOC on chart for MD signature

## 2019-04-27 NOTE — PROGRESS NOTES
Admitted to room 544 via stretcher from the emergency room. Alert and oriented, wife at bedside. Oriented to call light system, verbalizes understanding.

## 2019-04-27 NOTE — PROGRESS NOTES
Patient's 8 Polish right abdominal abscess drain placed without complication. 40 ml pink-tinged tan purulent thick fluid removed et sent for testing per physician's orders. Patient tolerated procedure well et returned to pre-Wendy score prior to transfer to Floor. Report called to Marco Lainez Floor RN. Condition stable at time of transfer to Floor.

## 2019-04-27 NOTE — PLAN OF CARE
Problem: Pain:  Goal: Pain level will decrease  Description  Pain level will decrease  Note:   Resting quietly in bed. No signs of discomfort noted. Call light in reach.

## 2019-04-28 PROCEDURE — 6360000002 HC RX W HCPCS: Performed by: STUDENT IN AN ORGANIZED HEALTH CARE EDUCATION/TRAINING PROGRAM

## 2019-04-28 PROCEDURE — 1200000000 HC SEMI PRIVATE

## 2019-04-28 PROCEDURE — 6360000002 HC RX W HCPCS: Performed by: SURGERY

## 2019-04-28 PROCEDURE — 2580000003 HC RX 258

## 2019-04-28 PROCEDURE — 2580000003 HC RX 258: Performed by: STUDENT IN AN ORGANIZED HEALTH CARE EDUCATION/TRAINING PROGRAM

## 2019-04-28 PROCEDURE — 6370000000 HC RX 637 (ALT 250 FOR IP): Performed by: SURGERY

## 2019-04-28 PROCEDURE — 99024 POSTOP FOLLOW-UP VISIT: CPT | Performed by: SURGERY

## 2019-04-28 PROCEDURE — 2580000003 HC RX 258: Performed by: SURGERY

## 2019-04-28 RX ORDER — IBUPROFEN 400 MG/1
400 TABLET ORAL EVERY 8 HOURS PRN
Status: DISCONTINUED | OUTPATIENT
Start: 2019-04-28 | End: 2019-04-29 | Stop reason: HOSPADM

## 2019-04-28 RX ORDER — SODIUM CHLORIDE 9 MG/ML
INJECTION, SOLUTION INTRAVENOUS
Status: COMPLETED
Start: 2019-04-28 | End: 2019-04-28

## 2019-04-28 RX ADMIN — PIPERACILLIN SODIUM,TAZOBACTAM SODIUM 3.38 G: 3; .375 INJECTION, POWDER, FOR SOLUTION INTRAVENOUS at 01:51

## 2019-04-28 RX ADMIN — OXYCODONE HYDROCHLORIDE AND ACETAMINOPHEN 1 TABLET: 5; 325 TABLET ORAL at 06:42

## 2019-04-28 RX ADMIN — OXYCODONE HYDROCHLORIDE AND ACETAMINOPHEN 1 TABLET: 5; 325 TABLET ORAL at 12:18

## 2019-04-28 RX ADMIN — FAMOTIDINE 20 MG: 20 TABLET ORAL at 22:17

## 2019-04-28 RX ADMIN — OXYCODONE HYDROCHLORIDE AND ACETAMINOPHEN 2 TABLET: 5; 325 TABLET ORAL at 22:19

## 2019-04-28 RX ADMIN — OXYCODONE HYDROCHLORIDE AND ACETAMINOPHEN 2 TABLET: 5; 325 TABLET ORAL at 17:12

## 2019-04-28 RX ADMIN — SODIUM CHLORIDE, PRESERVATIVE FREE 10 ML: 5 INJECTION INTRAVENOUS at 08:51

## 2019-04-28 RX ADMIN — SODIUM CHLORIDE, POTASSIUM CHLORIDE, SODIUM LACTATE AND CALCIUM CHLORIDE: 600; 310; 30; 20 INJECTION, SOLUTION INTRAVENOUS at 00:37

## 2019-04-28 RX ADMIN — FAMOTIDINE 20 MG: 20 TABLET ORAL at 08:51

## 2019-04-28 RX ADMIN — SODIUM CHLORIDE 10 ML: 9 INJECTION, SOLUTION INTRAVENOUS at 18:11

## 2019-04-28 RX ADMIN — ENOXAPARIN SODIUM 40 MG: 40 INJECTION SUBCUTANEOUS at 08:51

## 2019-04-28 RX ADMIN — PIPERACILLIN SODIUM,TAZOBACTAM SODIUM 3.38 G: 3; .375 INJECTION, POWDER, FOR SOLUTION INTRAVENOUS at 10:00

## 2019-04-28 RX ADMIN — PIPERACILLIN SODIUM,TAZOBACTAM SODIUM 3.38 G: 3; .375 INJECTION, POWDER, FOR SOLUTION INTRAVENOUS at 18:10

## 2019-04-28 ASSESSMENT — PAIN SCALES - GENERAL
PAINLEVEL_OUTOF10: 8
PAINLEVEL_OUTOF10: 4
PAINLEVEL_OUTOF10: 10
PAINLEVEL_OUTOF10: 4
PAINLEVEL_OUTOF10: 5
PAINLEVEL_OUTOF10: 5

## 2019-04-28 NOTE — PLAN OF CARE
Problem: Pain:  Goal: Pain level will decrease  Description  Pain level will decrease  Outcome: Ongoing  Note:   Pt rating pain on scale of 0-10. Instructed to use call light when needing something for pain. Pt verbalizes understanding. Will continue to monitor.

## 2019-04-28 NOTE — PROGRESS NOTES
[facility] GENERAL SURGERY    Surgery Progress Note            PATIENT NAME: Koko Amador     TODAY'S DATE: 4/28/2019    INTERVAL HISTORY:      Doing well this AM  Pain controlled  Denies nausea  States ready go go home      OBJECTIVE:   VITALS:  /84   Pulse 55   Temp 97.8 °F (36.6 °C) (Oral)   Resp 16   Ht 5' 8\" (1.727 m)   Wt 168 lb (76.2 kg)   SpO2 96%   BMI 25.54 kg/m²     INTAKE/OUTPUT:    I/O last 3 completed shifts: In: 150 [P.O.:150]  Out: 1285 [Urine:1245; Drains:40]  No intake/output data recorded. CONSTITUTIONAL:  awake and alert  ABDOMEN:  Soft, approprietly ttp, drain - seropurulent   INCISION: clean, dry, healing    Data:  CBC:   Recent Labs     04/26/19 1924 04/27/19  0606   WBC 12.8* 8.0   HGB 9.5* 9.0*   HCT 30.3* 29.3*    383     BMP:    Recent Labs     04/26/19 1924 04/27/19  0606    142   K 4.7 4.6   CL 96* 105   CO2 25 27   BUN 12 10   CREATININE 0.8* 0.7*   GLUCOSE 118* 94     Hepatic:   Recent Labs     04/26/19 1924   AST 12*   ALT 14   BILITOT 0.4   ALKPHOS 144*     Mag:    No results for input(s): MG in the last 72 hours. Phos:   No results for input(s): PHOS in the last 72 hours. INR:   Recent Labs     04/26/19 1924 04/27/19  0606   INR 1.39* 1.34*         Radiology Review:  CT    ASSESSMENT AND PLAN:  48 y.o. male status post robotic right colectomy, now with intra-abdominal abscess     - Doing well  - Regular diet, saline lock, PO pain meds  - Dc home later today   - Drain teaching     Electronically signed by Rupali Friedman MD     Patient seen and agree with above. Keep until tomorrow.     Shakira Baron MD

## 2019-04-29 VITALS
HEIGHT: 68 IN | TEMPERATURE: 98 F | HEART RATE: 63 BPM | RESPIRATION RATE: 14 BRPM | WEIGHT: 168 LBS | DIASTOLIC BLOOD PRESSURE: 86 MMHG | BODY MASS INDEX: 25.46 KG/M2 | SYSTOLIC BLOOD PRESSURE: 136 MMHG | OXYGEN SATURATION: 96 %

## 2019-04-29 DIAGNOSIS — T81.43XA POSTPROCEDURAL INTRAABDOMINAL ABSCESS: Primary | ICD-10-CM

## 2019-04-29 LAB
BODY FLUID CULTURE, STERILE: ABNORMAL
BODY FLUID CULTURE, STERILE: ABNORMAL
GRAM STAIN RESULT: ABNORMAL
ORGANISM: ABNORMAL

## 2019-04-29 PROCEDURE — 2580000003 HC RX 258: Performed by: SURGERY

## 2019-04-29 PROCEDURE — 6360000002 HC RX W HCPCS: Performed by: SURGERY

## 2019-04-29 PROCEDURE — 6370000000 HC RX 637 (ALT 250 FOR IP): Performed by: SURGERY

## 2019-04-29 PROCEDURE — 99024 POSTOP FOLLOW-UP VISIT: CPT | Performed by: SURGERY

## 2019-04-29 PROCEDURE — 6360000002 HC RX W HCPCS: Performed by: STUDENT IN AN ORGANIZED HEALTH CARE EDUCATION/TRAINING PROGRAM

## 2019-04-29 RX ORDER — OXYCODONE HYDROCHLORIDE AND ACETAMINOPHEN 5; 325 MG/1; MG/1
1 TABLET ORAL EVERY 6 HOURS PRN
Qty: 28 TABLET | Refills: 0 | Status: SHIPPED | OUTPATIENT
Start: 2019-04-29 | End: 2019-05-06

## 2019-04-29 RX ORDER — AMOXICILLIN AND CLAVULANATE POTASSIUM 875; 125 MG/1; MG/1
1 TABLET, FILM COATED ORAL 2 TIMES DAILY
Qty: 20 TABLET | Refills: 0 | Status: SHIPPED | OUTPATIENT
Start: 2019-04-29 | End: 2019-05-09

## 2019-04-29 RX ADMIN — FAMOTIDINE 20 MG: 20 TABLET ORAL at 08:37

## 2019-04-29 RX ADMIN — PIPERACILLIN SODIUM,TAZOBACTAM SODIUM 3.38 G: 3; .375 INJECTION, POWDER, FOR SOLUTION INTRAVENOUS at 03:12

## 2019-04-29 RX ADMIN — ENOXAPARIN SODIUM 40 MG: 40 INJECTION SUBCUTANEOUS at 08:37

## 2019-04-29 RX ADMIN — OXYCODONE HYDROCHLORIDE AND ACETAMINOPHEN 2 TABLET: 5; 325 TABLET ORAL at 08:37

## 2019-04-29 ASSESSMENT — PAIN SCALES - GENERAL: PAINLEVEL_OUTOF10: 9

## 2019-04-29 NOTE — PROGRESS NOTES
Pt and wife given discharge instructions and verbalized understanding. Writer demonstrated draining the LEONEL. Supplies given for dressing change if needed and for drain output. Scripts given for antibiotic and pain meds.  Pt to be taken out by w/c to be driven home by his wife

## 2019-04-29 NOTE — DISCHARGE SUMMARY
Surgery Discharge Summary    Patient Identification  Marilyn Isaacs is a 48 y.o. male. :  1965  Admit Date:  2019    Discharge date:   No discharge date for patient encounter. Disposition: home    Discharge Diagnoses: Active Problems:    Postprocedural intraabdominal abscess  Resolved Problems:    * No resolved hospital problems. *      Discharge condition: good    Discharge Medications:     Current Discharge Medication List      CONTINUE these medications which have NOT CHANGED    Details   oxyCODONE-acetaminophen (PERCOCET) 5-325 MG per tablet Take 1-2 tablets by mouth every 6 hours as needed for Pain for up to 5 days. Intended supply: 5 days.  Take lowest dose possible to manage pain  Qty: 20 tablet, Refills: 0    Comments: Reduce doses taken as pain becomes manageable  May need 1-2 tabs for postop pain control  Associated Diagnoses: S/P partial colectomy      omeprazole (PRILOSEC) 40 MG delayed release capsule Take 1 capsule by mouth every morning (before breakfast)  Qty: 30 capsule, Refills: 2      vitamin D (ERGOCALCIFEROL) 96492 units CAPS capsule Take 1 capsule by mouth once a week  Qty: 12 capsule, Refills: 0                Current Discharge Medication List      START taking these medications    Details   amoxicillin-clavulanate (AUGMENTIN) 875-125 MG per tablet Take 1 tablet by mouth 2 times daily for 10 days  Qty: 20 tablet, Refills: 0               Most Recent Labs:    CBC:   Recent Labs     19  0606   WBC 12.8* 8.0   HGB 9.5* 9.0*   HCT 30.3* 29.3*    383     BMP:    Recent Labs     19  0606    142   K 4.7 4.6   CL 96* 105   CO2 25 27   BUN 12 10   CREATININE 0.8* 0.7*   GLUCOSE 118* 94     Hepatic:   Recent Labs     19   AST 12*   ALT 14   BILITOT 0.4   ALKPHOS 144*     PT/INR:    Recent Labs     19  0606   INR 1.39* 1.34*         Consults: IR    Surgery: IR perc drain    Patient Instructions: Activity: no heavy lifting, pushing, pulling for 6 weeks, no driving for 2 weeks or while on analgesics  Diet: As tolerated  Follow-up with Dr. Cathie Fields in 1 week. The patient and/or family/patient representatives, were provided education regarding discharge instructions, ongoing treatment and follow-up. Details of information given to the patient may be found in the discharge instructions located in the EMR. HPI and Hospital Course:   Admitted with fever after right colectomy. Had abscess. Drained by IR.  Home today with follow-up CT and office visit in 1 weeks      [address]

## 2019-04-30 ENCOUNTER — TELEPHONE (OUTPATIENT)
Dept: SURGERY | Age: 54
End: 2019-04-30

## 2019-04-30 NOTE — TELEPHONE ENCOUNTER
Advised patient his repeat CT is scheduled on Monday, May 6, 2019 at [facility]. Arrive at 1:15 p.m., test at 2:30 p.m. NPO 4 hrs prior. He will see Dr. Coral Cuenca in the office after the CT.

## 2019-05-01 LAB
BLOOD CULTURE, ROUTINE: NORMAL
CULTURE, BLOOD 2: NORMAL

## 2019-05-03 DIAGNOSIS — Z90.49 S/P RIGHT COLECTOMY: Primary | ICD-10-CM

## 2019-05-03 RX ORDER — OXYCODONE HYDROCHLORIDE AND ACETAMINOPHEN 5; 325 MG/1; MG/1
1 TABLET ORAL EVERY 6 HOURS PRN
Qty: 20 TABLET | Refills: 0 | Status: SHIPPED | OUTPATIENT
Start: 2019-05-03 | End: 2019-05-08

## 2019-05-06 ENCOUNTER — HOSPITAL ENCOUNTER (OUTPATIENT)
Dept: CT IMAGING | Age: 54
Discharge: HOME | End: 2019-05-06
Payer: COMMERCIAL

## 2019-05-06 ENCOUNTER — OFFICE VISIT (OUTPATIENT)
Dept: SURGERY | Age: 54
End: 2019-05-06

## 2019-05-06 VITALS
HEART RATE: 69 BPM | WEIGHT: 158.8 LBS | HEIGHT: 68 IN | DIASTOLIC BLOOD PRESSURE: 86 MMHG | SYSTOLIC BLOOD PRESSURE: 143 MMHG | BODY MASS INDEX: 24.07 KG/M2

## 2019-05-06 DIAGNOSIS — C18.2 MALIGNANT NEOPLASM OF ASCENDING COLON (HCC): Primary | ICD-10-CM

## 2019-05-06 DIAGNOSIS — Z90.49 S/P RIGHT COLECTOMY: ICD-10-CM

## 2019-05-06 DIAGNOSIS — T81.43XA POSTPROCEDURAL INTRAABDOMINAL ABSCESS: ICD-10-CM

## 2019-05-06 PROCEDURE — 6360000004 HC RX CONTRAST MEDICATION: Performed by: SURGERY

## 2019-05-06 PROCEDURE — 99024 POSTOP FOLLOW-UP VISIT: CPT | Performed by: SURGERY

## 2019-05-06 PROCEDURE — 74177 CT ABD & PELVIS W/CONTRAST: CPT

## 2019-05-06 RX ORDER — OXYCODONE HYDROCHLORIDE AND ACETAMINOPHEN 5; 325 MG/1; MG/1
1 TABLET ORAL EVERY 6 HOURS PRN
Qty: 20 TABLET | Refills: 0 | Status: SHIPPED | OUTPATIENT
Start: 2019-05-08 | End: 2019-05-13

## 2019-05-06 RX ADMIN — IOHEXOL 50 ML: 240 INJECTION, SOLUTION INTRATHECAL; INTRAVASCULAR; INTRAVENOUS; ORAL at 15:02

## 2019-05-06 RX ADMIN — IOPAMIDOL 75 ML: 755 INJECTION, SOLUTION INTRAVENOUS at 15:02

## 2019-05-06 NOTE — PROGRESS NOTES
HPI: Nursing notes reviewed. Patient reports pain is improving. Gets some in right side and back. Some pain here with bowel movements if straining and randomly at times. Some pain after eating too. No fevers or chills. Energy low. ROS:  10 point review of systems performed with pertinent positives in HPI    Phys:    Abd - soft, mild pain, nondistended, perc drain serous   Incisions - well healed    Assesment: 49 yo s/p robotic right colectomy    Plan: 1. CT from today without residual abscess - perc drain removed. 2.  Symptoms related to residual inflammation. Finish abx   3.   F/u in two weeks

## 2019-05-07 ENCOUNTER — TELEPHONE (OUTPATIENT)
Dept: FAMILY MEDICINE CLINIC | Age: 54
End: 2019-05-07

## 2019-05-07 DIAGNOSIS — C80.1 CANCER (HCC): Primary | ICD-10-CM

## 2019-05-15 ENCOUNTER — TELEPHONE (OUTPATIENT)
Dept: SURGERY | Age: 54
End: 2019-05-15

## 2019-05-15 NOTE — TELEPHONE ENCOUNTER
Advised patient excision of scalp cyst is scheduled at [facility] on Thursday, May 23, 2019. Arrive at 12:00 a.m., surgery at 1:00 p.m. Local anesthesia.

## 2019-05-20 ENCOUNTER — OFFICE VISIT (OUTPATIENT)
Dept: SURGERY | Age: 54
End: 2019-05-20

## 2019-05-20 VITALS
DIASTOLIC BLOOD PRESSURE: 78 MMHG | WEIGHT: 159 LBS | SYSTOLIC BLOOD PRESSURE: 124 MMHG | BODY MASS INDEX: 24.1 KG/M2 | HEIGHT: 68 IN

## 2019-05-20 DIAGNOSIS — Z90.49 S/P RIGHT COLECTOMY: Primary | ICD-10-CM

## 2019-05-20 PROCEDURE — 99024 POSTOP FOLLOW-UP VISIT: CPT | Performed by: SURGERY

## 2019-05-23 ENCOUNTER — HOSPITAL ENCOUNTER (OUTPATIENT)
Age: 54
Setting detail: OUTPATIENT SURGERY
Discharge: HOME | End: 2019-05-23
Attending: SURGERY | Admitting: SURGERY
Payer: COMMERCIAL

## 2019-05-23 VITALS
WEIGHT: 157 LBS | HEART RATE: 80 BPM | DIASTOLIC BLOOD PRESSURE: 105 MMHG | RESPIRATION RATE: 16 BRPM | OXYGEN SATURATION: 100 % | SYSTOLIC BLOOD PRESSURE: 162 MMHG | BODY MASS INDEX: 23.79 KG/M2 | HEIGHT: 68 IN | TEMPERATURE: 97.4 F

## 2019-05-23 DIAGNOSIS — D23.4 DERMOID CYST OF SCALP: ICD-10-CM

## 2019-05-23 PROCEDURE — 2580000003 HC RX 258: Performed by: SURGERY

## 2019-05-23 PROCEDURE — 88304 TISSUE EXAM BY PATHOLOGIST: CPT

## 2019-05-23 PROCEDURE — 2500000003 HC RX 250 WO HCPCS: Performed by: SURGERY

## 2019-05-23 PROCEDURE — 7100000011 HC PHASE II RECOVERY - ADDTL 15 MIN: Performed by: SURGERY

## 2019-05-23 PROCEDURE — 3600000003 HC SURGERY LEVEL 3 BASE: Performed by: SURGERY

## 2019-05-23 PROCEDURE — 7100000010 HC PHASE II RECOVERY - FIRST 15 MIN: Performed by: SURGERY

## 2019-05-23 PROCEDURE — 3600000013 HC SURGERY LEVEL 3 ADDTL 15MIN: Performed by: SURGERY

## 2019-05-23 PROCEDURE — 11423 EXC H-F-NK-SP B9+MARG 2.1-3: CPT | Performed by: SURGERY

## 2019-05-23 PROCEDURE — 2709999900 HC NON-CHARGEABLE SUPPLY: Performed by: SURGERY

## 2019-05-23 PROCEDURE — 11422 EXC H-F-NK-SP B9+MARG 1.1-2: CPT | Performed by: SURGERY

## 2019-05-23 RX ORDER — OXYCODONE HYDROCHLORIDE AND ACETAMINOPHEN 5; 325 MG/1; MG/1
1 TABLET ORAL EVERY 6 HOURS PRN
Qty: 8 TABLET | Refills: 0 | Status: SHIPPED | OUTPATIENT
Start: 2019-05-23 | End: 2019-05-25

## 2019-05-23 RX ORDER — BUPIVACAINE HYDROCHLORIDE AND EPINEPHRINE 5; 5 MG/ML; UG/ML
INJECTION, SOLUTION PERINEURAL PRN
Status: DISCONTINUED | OUTPATIENT
Start: 2019-05-23 | End: 2019-05-23 | Stop reason: ALTCHOICE

## 2019-05-23 RX ORDER — MAGNESIUM HYDROXIDE 1200 MG/15ML
LIQUID ORAL CONTINUOUS PRN
Status: COMPLETED | OUTPATIENT
Start: 2019-05-23 | End: 2019-05-23

## 2019-05-23 ASSESSMENT — PAIN SCALES - GENERAL: PAINLEVEL_OUTOF10: 0

## 2019-05-23 ASSESSMENT — PAIN - FUNCTIONAL ASSESSMENT: PAIN_FUNCTIONAL_ASSESSMENT: 0-10

## 2019-05-23 NOTE — H&P
I have reviewed the history and physical and examined the patient. I find no relevant changes. I have reviewed with the patient and/or family members, during the preoperative office visit the risks, benefits, and alternatives to the procedure.     Latisha Lee

## 2019-05-23 NOTE — OP NOTE
Date of Surgery: 5/23/19    Preop Dx:  Sebaceous Cysts of Scalp x3    Postop Dx:  Same    Procedure:  Excision of Sebaceous Cysts of Scalp x3    Surgeon:  Rik Xavier    Assistant:      Anesthesia:  local    EBL:   <50ml    Specimen:  Sebaceous cysts of scalp x3    Complications: none    Drains/Lines:  none    Indications:  47 yo with enlarging uncomfortable sebaceous cysts of scalp    Description:  Patient was given adequate description of the risks and rewards of the procedure, including bleeding, infection and recurrence and freely consented. He was placed in lateral position. Prepped and draped in usual sterile fashion. Local anesthetic instilled over all three cysts. Incisions made with #15 blade over each. Connective tissue loosened using sharp and blunt dissection. Each cyst extended into subcutaneous space. Two of them measured 1.5x1. 5x1.5cm and one measured 2.5x2.5x2.5cm. All sent to pathology. Hemostasis achieved with electrocautery. Incisions closed with 3-0 vicryl in subcutaneous space and 3-0 nylon for epidermis. All suture, sponge and instrument count correct times two at end of case. Transferred to PACU in stable condition.     Shakira Baron MD

## 2019-06-06 ENCOUNTER — TELEPHONE (OUTPATIENT)
Dept: SURGERY | Age: 54
End: 2019-06-06

## 2019-06-06 NOTE — TELEPHONE ENCOUNTER
Pt called complaining of extreme weakness w/ low grade fever/chills 99.8, mild headache, he is eating but still has much discomfort when going to the BR, still having mucousy runny stools, can't sleep well and has to wake up several times through the night to urinate. Spoke w/ CNP who advised if his symptoms worsen and his fever goes above 101 to go to ER. He will keep his f/u appt w/ Dr Connie nolen for this [facility] 6/10/19.

## 2019-06-10 ENCOUNTER — OFFICE VISIT (OUTPATIENT)
Dept: SURGERY | Age: 54
End: 2019-06-10

## 2019-06-10 VITALS
DIASTOLIC BLOOD PRESSURE: 80 MMHG | HEIGHT: 68 IN | BODY MASS INDEX: 24.71 KG/M2 | WEIGHT: 163 LBS | SYSTOLIC BLOOD PRESSURE: 132 MMHG

## 2019-06-10 DIAGNOSIS — Z90.49 S/P RIGHT COLECTOMY: Primary | ICD-10-CM

## 2019-06-10 PROCEDURE — 99024 POSTOP FOLLOW-UP VISIT: CPT | Performed by: SURGERY

## 2019-06-10 NOTE — PROGRESS NOTES
HPI: Nursing notes reviewed. Patient reports no pain in scalp. No drainage. Some diarrhea daily 2-3 times. No fevers. ROS:  10 point review of systems performed with pertinent positives in HPI    Phys:    Abd - soft, nontender, nondistended   Scalp - no erythema,     Assesment: 47 yo s/p scalp cyst excision x3    Plan: 1. Sutures removed in office   2. No activity limitations   3. Start fiber daily for diarrhea   4.   To see Oncology soon

## 2019-06-21 ENCOUNTER — TELEPHONE (OUTPATIENT)
Dept: FAMILY MEDICINE CLINIC | Age: 54
End: 2019-06-21

## 2019-07-05 ENCOUNTER — OFFICE VISIT (OUTPATIENT)
Dept: FAMILY MEDICINE CLINIC | Age: 54
End: 2019-07-05
Payer: COMMERCIAL

## 2019-07-05 VITALS
WEIGHT: 169 LBS | OXYGEN SATURATION: 98 % | HEIGHT: 67 IN | DIASTOLIC BLOOD PRESSURE: 114 MMHG | HEART RATE: 70 BPM | BODY MASS INDEX: 26.53 KG/M2 | SYSTOLIC BLOOD PRESSURE: 184 MMHG

## 2019-07-05 DIAGNOSIS — R79.89 LOW VITAMIN D LEVEL: ICD-10-CM

## 2019-07-05 DIAGNOSIS — F51.02 ADJUSTMENT INSOMNIA: Primary | ICD-10-CM

## 2019-07-05 DIAGNOSIS — I10 HYPERTENSION, UNSPECIFIED TYPE: ICD-10-CM

## 2019-07-05 DIAGNOSIS — T81.40XA POSTOPERATIVE INFECTION, UNSPECIFIED TYPE, INITIAL ENCOUNTER: ICD-10-CM

## 2019-07-05 LAB
ALBUMIN SERPL-MCNC: 4.9 G/DL (ref 3.4–5)
ALP BLD-CCNC: 111 U/L (ref 40–129)
ALT SERPL-CCNC: 16 U/L (ref 10–40)
ANION GAP SERPL CALCULATED.3IONS-SCNC: 11 MMOL/L (ref 3–16)
AST SERPL-CCNC: 17 U/L (ref 15–37)
BASOPHILS ABSOLUTE: 0 K/UL (ref 0–0.2)
BASOPHILS RELATIVE PERCENT: 0.5 %
BILIRUB SERPL-MCNC: <0.2 MG/DL (ref 0–1)
BILIRUBIN DIRECT: <0.2 MG/DL (ref 0–0.3)
BILIRUBIN, INDIRECT: NORMAL MG/DL (ref 0–1)
BUN BLDV-MCNC: 14 MG/DL (ref 7–20)
CALCIUM SERPL-MCNC: 9.9 MG/DL (ref 8.3–10.6)
CHLORIDE BLD-SCNC: 105 MMOL/L (ref 99–110)
CO2: 26 MMOL/L (ref 21–32)
CREAT SERPL-MCNC: 0.7 MG/DL (ref 0.9–1.3)
EOSINOPHILS ABSOLUTE: 0.1 K/UL (ref 0–0.6)
EOSINOPHILS RELATIVE PERCENT: 2.3 %
GFR AFRICAN AMERICAN: >60
GFR NON-AFRICAN AMERICAN: >60
GLUCOSE BLD-MCNC: 101 MG/DL (ref 70–99)
HCT VFR BLD CALC: 44.5 % (ref 40.5–52.5)
HEMOGLOBIN: 14.5 G/DL (ref 13.5–17.5)
LYMPHOCYTES ABSOLUTE: 1.3 K/UL (ref 1–5.1)
LYMPHOCYTES RELATIVE PERCENT: 21.2 %
MCH RBC QN AUTO: 27.2 PG (ref 26–34)
MCHC RBC AUTO-ENTMCNC: 32.7 G/DL (ref 31–36)
MCV RBC AUTO: 83.2 FL (ref 80–100)
MONOCYTES ABSOLUTE: 0.5 K/UL (ref 0–1.3)
MONOCYTES RELATIVE PERCENT: 8.8 %
NEUTROPHILS ABSOLUTE: 4 K/UL (ref 1.7–7.7)
NEUTROPHILS RELATIVE PERCENT: 67.2 %
PDW BLD-RTO: 19.1 % (ref 12.4–15.4)
PLATELET # BLD: 205 K/UL (ref 135–450)
PMV BLD AUTO: 8.8 FL (ref 5–10.5)
POTASSIUM SERPL-SCNC: 4.1 MMOL/L (ref 3.5–5.1)
RBC # BLD: 5.34 M/UL (ref 4.2–5.9)
SODIUM BLD-SCNC: 142 MMOL/L (ref 136–145)
TOTAL PROTEIN: 7.6 G/DL (ref 6.4–8.2)
WBC # BLD: 5.9 K/UL (ref 4–11)

## 2019-07-05 PROCEDURE — 3017F COLORECTAL CA SCREEN DOC REV: CPT | Performed by: NURSE PRACTITIONER

## 2019-07-05 PROCEDURE — 1036F TOBACCO NON-USER: CPT | Performed by: NURSE PRACTITIONER

## 2019-07-05 PROCEDURE — G8419 CALC BMI OUT NRM PARAM NOF/U: HCPCS | Performed by: NURSE PRACTITIONER

## 2019-07-05 PROCEDURE — G8427 DOCREV CUR MEDS BY ELIG CLIN: HCPCS | Performed by: NURSE PRACTITIONER

## 2019-07-05 PROCEDURE — 99214 OFFICE O/P EST MOD 30 MIN: CPT | Performed by: NURSE PRACTITIONER

## 2019-07-05 RX ORDER — LISINOPRIL AND HYDROCHLOROTHIAZIDE 25; 20 MG/1; MG/1
1 TABLET ORAL DAILY
Qty: 90 TABLET | Refills: 1 | Status: SHIPPED | OUTPATIENT
Start: 2019-07-05 | End: 2019-12-16 | Stop reason: SDUPTHER

## 2019-07-05 RX ORDER — OMEPRAZOLE 40 MG/1
40 CAPSULE, DELAYED RELEASE ORAL
Qty: 90 CAPSULE | Refills: 3 | Status: SHIPPED | OUTPATIENT
Start: 2019-07-05 | End: 2020-08-18 | Stop reason: ALTCHOICE

## 2019-07-05 ASSESSMENT — ENCOUNTER SYMPTOMS
SHORTNESS OF BREATH: 1
BLURRED VISION: 0
ORTHOPNEA: 0

## 2019-07-05 NOTE — PROGRESS NOTES
Non-medical: Not on file   Tobacco Use    Smoking status: Never Smoker    Smokeless tobacco: Never Used   Substance and Sexual Activity    Alcohol use: Not Currently     Frequency: Never     Comment: rarely    Drug use: Never    Sexual activity: Not on file   Lifestyle    Physical activity:     Days per week: Not on file     Minutes per session: Not on file    Stress: Not on file   Relationships    Social connections:     Talks on phone: Not on file     Gets together: Not on file     Attends Worship service: Not on file     Active member of club or organization: Not on file     Attends meetings of clubs or organizations: Not on file     Relationship status: Not on file    Intimate partner violence:     Fear of current or ex partner: Not on file     Emotionally abused: Not on file     Physically abused: Not on file     Forced sexual activity: Not on file   Other Topics Concern    Not on file   Social History Narrative    Not on file       Current Outpatient Medications on File Prior to Visit   Medication Sig Dispense Refill    vitamin D (ERGOCALCIFEROL) 60581 units CAPS capsule Take 1 capsule by mouth once a week 12 capsule 0     No current facility-administered medications on file prior to visit. Review of Systems   Constitutional: Positive for malaise/fatigue. Eyes: Negative for blurred vision. Respiratory: Positive for shortness of breath. Cardiovascular: Negative. Negative for chest pain, palpitations and orthopnea. Endocrine:        Iron deficiency anemia   Genitourinary:        Of colon cancer stage IV with metastases successful removal of most of the polyps. He is to go back in another year for another esophagogastroduodenostomy/colonoscopy to see if any polyps are left. Musculoskeletal: Negative for neck pain. Neurological: Positive for headaches. Hematological: Bruises/bleeds easily. Psychiatric/Behavioral: Negative.         Objective:     Physical Exam Constitutional: He is oriented to person, place, and time. He appears well-developed and well-nourished. HENT:   Head: Normocephalic and atraumatic. Eyes: Conjunctivae are normal.   Neck: Neck supple. Cardiovascular: Regular rhythm and normal heart sounds. Exam reveals no gallop and no friction rub. No murmur heard. Pulmonary/Chest: Effort normal and breath sounds normal. He has no wheezes. He has no rales. Abdominal: Soft. Bowel sounds are normal. He exhibits no distension and no mass. There is no tenderness. There is no rebound and no guarding. No hernia. All incision well approximated well-healed on abdomen. No opening of any kind   Musculoskeletal: Normal range of motion. Neurological: He is alert and oriented to person, place, and time. Skin: Skin is warm and dry. Capillary refill takes less than 2 seconds. Psychiatric: He has a normal mood and affect. His behavior is normal. Judgment and thought content normal.   Nursing note and vitals reviewed. Assessment:       ICD-10-CM    1. Adjustment insomnia F51.02    2. Postoperative infection, unspecified type, initial encounter T81.40XA CBC WITH AUTO DIFFERENTIAL     BASIC METABOLIC PANEL     TSH with Reflex     HEPATIC FUNCTION PANEL     HEPATIC FUNCTION PANEL     TSH with Reflex     BASIC METABOLIC PANEL     CBC WITH AUTO DIFFERENTIAL   3. Low vitamin D level R79.89 Vitamin D 25 Hydroxy   4. Hypertension, unspecified type I10        Plan:     1. Postoperative infection, unspecified type, initial encounter    - CBC WITH AUTO DIFFERENTIAL; Future  - BASIC METABOLIC PANEL; Future  - TSH with Reflex; Future  - HEPATIC FUNCTION PANEL; Future  - HEPATIC FUNCTION PANEL  - TSH with Reflex  - BASIC METABOLIC PANEL  - CBC WITH AUTO DIFFERENTIAL    2. Low vitamin D level    - Vitamin D 25 Hydroxy    3. Adjustment insomnia  Melatonin 400 mg at bedtime    4.  Hypertension, unspecified type  /110 and looking back his BPs have followed similarly

## 2019-07-05 NOTE — TELEPHONE ENCOUNTER
Future Appointments   Date Time Provider Leonila Price   7/5/2019  4:00 PM PARVEZ Medina - CNP [facility] 100 [facility] 4/11/2019

## 2019-07-06 LAB
T4 FREE: 1.2 NG/DL (ref 0.9–1.8)
TSH REFLEX: 4.35 UIU/ML (ref 0.27–4.2)

## 2019-07-07 LAB — VITAMIN D 25-HYDROXY: 30.4 NG/ML

## 2019-07-08 ENCOUNTER — TELEPHONE (OUTPATIENT)
Dept: FAMILY MEDICINE CLINIC | Age: 54
End: 2019-07-08

## 2019-07-08 DIAGNOSIS — F51.02 ADJUSTMENT INSOMNIA: Primary | ICD-10-CM

## 2019-07-08 NOTE — TELEPHONE ENCOUNTER
Left detailed message on recorder for patient to call and make appointment with pulmonologist. Luz Mejia phone number

## 2019-07-19 ENCOUNTER — OFFICE VISIT (OUTPATIENT)
Dept: FAMILY MEDICINE CLINIC | Age: 54
End: 2019-07-19
Payer: COMMERCIAL

## 2019-07-19 ENCOUNTER — OFFICE VISIT (OUTPATIENT)
Dept: PULMONOLOGY | Age: 54
End: 2019-07-19
Payer: COMMERCIAL

## 2019-07-19 VITALS
RESPIRATION RATE: 16 BRPM | HEART RATE: 73 BPM | OXYGEN SATURATION: 97 % | WEIGHT: 168 LBS | BODY MASS INDEX: 26.31 KG/M2 | TEMPERATURE: 98.5 F | SYSTOLIC BLOOD PRESSURE: 142 MMHG | DIASTOLIC BLOOD PRESSURE: 96 MMHG

## 2019-07-19 VITALS
HEART RATE: 87 BPM | RESPIRATION RATE: 16 BRPM | DIASTOLIC BLOOD PRESSURE: 93 MMHG | SYSTOLIC BLOOD PRESSURE: 137 MMHG | OXYGEN SATURATION: 97 % | BODY MASS INDEX: 26.21 KG/M2 | WEIGHT: 167 LBS | HEIGHT: 67 IN

## 2019-07-19 DIAGNOSIS — G47.30 SLEEP APNEA, UNSPECIFIED TYPE: ICD-10-CM

## 2019-07-19 DIAGNOSIS — R10.9 FLANK PAIN, CHRONIC: ICD-10-CM

## 2019-07-19 DIAGNOSIS — N20.0 BILATERAL NEPHROLITHIASIS: ICD-10-CM

## 2019-07-19 DIAGNOSIS — C18.2 CARCINOMA OF ASCENDING COLON (HCC): Primary | ICD-10-CM

## 2019-07-19 DIAGNOSIS — I10 UNCONTROLLED HYPERTENSION: ICD-10-CM

## 2019-07-19 DIAGNOSIS — E02 SUBCLINICAL IODINE-DEFICIENCY HYPOTHYROIDISM: ICD-10-CM

## 2019-07-19 DIAGNOSIS — G89.29 FLANK PAIN, CHRONIC: ICD-10-CM

## 2019-07-19 DIAGNOSIS — I10 ESSENTIAL HYPERTENSION: Primary | ICD-10-CM

## 2019-07-19 DIAGNOSIS — R29.818 SUSPECTED SLEEP APNEA: ICD-10-CM

## 2019-07-19 LAB — TSH SERPL DL<=0.05 MIU/L-ACNC: 4.54 UIU/ML (ref 0.27–4.2)

## 2019-07-19 PROCEDURE — 1036F TOBACCO NON-USER: CPT | Performed by: NURSE PRACTITIONER

## 2019-07-19 PROCEDURE — G8427 DOCREV CUR MEDS BY ELIG CLIN: HCPCS | Performed by: INTERNAL MEDICINE

## 2019-07-19 PROCEDURE — 1036F TOBACCO NON-USER: CPT | Performed by: INTERNAL MEDICINE

## 2019-07-19 PROCEDURE — 99213 OFFICE O/P EST LOW 20 MIN: CPT | Performed by: NURSE PRACTITIONER

## 2019-07-19 PROCEDURE — G8427 DOCREV CUR MEDS BY ELIG CLIN: HCPCS | Performed by: NURSE PRACTITIONER

## 2019-07-19 PROCEDURE — 99203 OFFICE O/P NEW LOW 30 MIN: CPT | Performed by: INTERNAL MEDICINE

## 2019-07-19 PROCEDURE — 3017F COLORECTAL CA SCREEN DOC REV: CPT | Performed by: INTERNAL MEDICINE

## 2019-07-19 PROCEDURE — G8419 CALC BMI OUT NRM PARAM NOF/U: HCPCS | Performed by: INTERNAL MEDICINE

## 2019-07-19 PROCEDURE — G8419 CALC BMI OUT NRM PARAM NOF/U: HCPCS | Performed by: NURSE PRACTITIONER

## 2019-07-19 PROCEDURE — 3017F COLORECTAL CA SCREEN DOC REV: CPT | Performed by: NURSE PRACTITIONER

## 2019-07-19 RX ORDER — AMLODIPINE BESYLATE 5 MG/1
5 TABLET ORAL DAILY
Qty: 90 TABLET | Refills: 3 | Status: SHIPPED | OUTPATIENT
Start: 2019-07-19 | End: 2020-08-12 | Stop reason: SDUPTHER

## 2019-07-19 RX ORDER — AMLODIPINE BESYLATE 5 MG/1
5 TABLET ORAL DAILY
Qty: 30 TABLET | Refills: 3 | Status: SHIPPED | OUTPATIENT
Start: 2019-07-19 | End: 2019-07-19

## 2019-07-19 ASSESSMENT — SLEEP AND FATIGUE QUESTIONNAIRES
HOW LIKELY ARE YOU TO NOD OFF OR FALL ASLEEP WHILE WATCHING TV: 2
HOW LIKELY ARE YOU TO NOD OFF OR FALL ASLEEP WHEN YOU ARE A PASSENGER IN A CAR FOR AN HOUR WITHOUT A BREAK: 3
HOW LIKELY ARE YOU TO NOD OFF OR FALL ASLEEP WHILE SITTING QUIETLY AFTER LUNCH WITHOUT ALCOHOL: 2
HOW LIKELY ARE YOU TO NOD OFF OR FALL ASLEEP WHILE SITTING INACTIVE IN A PUBLIC PLACE: 3
HOW LIKELY ARE YOU TO NOD OFF OR FALL ASLEEP WHILE SITTING AND READING: 2
HOW LIKELY ARE YOU TO NOD OFF OR FALL ASLEEP WHILE LYING DOWN TO REST IN THE AFTERNOON WHEN CIRCUMSTANCES PERMIT: 3
ESS TOTAL SCORE: 15
HOW LIKELY ARE YOU TO NOD OFF OR FALL ASLEEP WHILE SITTING AND TALKING TO SOMEONE: 0
HOW LIKELY ARE YOU TO NOD OFF OR FALL ASLEEP IN A CAR, WHILE STOPPED FOR A FEW MINUTES IN TRAFFIC: 0
NECK CIRCUMFERENCE (INCHES): 14.25

## 2019-07-19 ASSESSMENT — ENCOUNTER SYMPTOMS: RESPIRATORY NEGATIVE: 1

## 2019-07-19 NOTE — PROGRESS NOTES
MA Communication:   The following orders are received by verbal communication from Nydia Yanez MD    Orders include:  HST scheduled 7/25/19       FU scheduled 8/7/19

## 2019-07-19 NOTE — PATIENT INSTRUCTIONS
Remember to bring all pulmonary medications to your next appointment with the office. Please keep all of your future appointments scheduled by [address], [facility] office. Out of respect for other patients and providers, you may be asked to reschedule your appointment if you arrive later than your scheduled appointment time. Appointments cancelled less than 24hrs in advance will be considered a no show. Patients with three missed appointments within 1 year or four missed appointments within 2 years can be dismissed from the practice. You may receive a survey regarding the care you received during your visit. Your input is valuable to us. We encourage you to complete and return your survey. We hope you will choose us in the future for your healthcare needs. .  Berta Needs   [facility] [phone] [address], [address]   [address] [place] [phone] [place] [address], [address] [place] [address] Home Patient [phone] [address], [address]   [facility]       ([address])  [address] [facility] ([address])  Jay Leónin (Sleep) [phone]   107 [facility]  Jose, [address]   [address] [place] [address] [phone] 229 [address] [phone] 1025 [facility].   [facility]   [address] [phone] 251 [address] [facility], [address]   [address] [phone] or  270 [facility] [address], [address] [facility]  ([address]) 71  [address], Luige Mines[address]   [place] [address] [phone]    [facility] Surgical [phone] [address] [place] [address], [address]    [facility]

## 2019-07-19 NOTE — PROGRESS NOTES
Bowel sounds present. No distension or hernia. Notenderness. Musculoskeletal: No cyanosis. No clubbing. No obvious joint deformity. Lymphadenopathy: No cervical or supraclavicular adenopathy. Skin: Skin is warm and dry. No rash or nodules on the exposed extremities. Psychiatric: Normal mood and affect. Behavior is normal.  No anxiety. Neurologic: Alert, awake and oriented. PERRL. Speech fluent      Sleep Medicine Data:  Sitting and reading: Moderate chance of dozing  Watching TV: Moderate chance of dozing  Sitting, inactive in a public place (e.g. a theatre or a meeting): High chance of dozing  As a passenger in a car for an hour without a break: High chance of dozing  Lying down to rest in the afternoon when circumstances permit: High chance of dozing  Sitting and talking to someone: Would never doze  Sitting quietly after a lunch without alcohol: Moderate chance of dozing  In a car, while stopped for a few minutes in traffic: Would never doze  Total score: 15  Neck circumference: 14.25    Data:     Imaging:  I have reviewed radiology images personally. No orders to display     Results for Alexandria Allan (MRN [id number]) as of 7/19/2019 14:15   Ref.  Range 4/26/2019 19:24 4/27/2019 06:06 7/5/2019 12:00   Sodium Latest Ref Range: 136 - 145 mmol/L 138 142 142   Potassium Latest Ref Range: 3.5 - 5.1 mmol/L 4.7 4.6 4.1   Chloride Latest Ref Range: 99 - 110 mmol/L 96 (L) 105 105   CO2 Latest Ref Range: 21 - 32 mmol/L 25 27 26   BUN Latest Ref Range: 7 - 20 mg/dL 12 10 14   Creatinine Latest Ref Range: 0.9 - 1.3 mg/dL 0.8 (L) 0.7 (L) 0.7 (L)   Anion Gap Latest Ref Range: 3 - 16  17 (H) 10 11   GFR Non- Latest Ref Range: >60  >60 >60 >60   GFR  Latest Ref Range: >60  >60 >60 >60   Lactic Acid, Sepsis Latest Ref Range: 0.4 - 1.9 mmol/L 0.8     Glucose Latest Ref Range: 70 - 99 mg/dL 118 (H) 94 101 (H)   Calcium Latest Ref Range: 8.3 - 10.6 mg/dL 9.0 8.7 9.9   Total Protein Latest Postprocedural intraabdominal abscess   TECHNOLOGIST PROVIDED HISTORY:   Additional Contrast?->Oral   Reason for exam:->intra abdominal abscess/S/P colon resection/colon cancer   Ordering Physician Provided Reason for Exam: f/u abdominal abscess-drain in   place x 9 days   Acuity: Acute   Type of Exam: Subsequent/Follow-up   Relevant Medical/Surgical History: no other abd surg       FINDINGS:   Lower Chest: No pleural effusions are noted.  Small hiatal hernia seen. There is nonspecific thickening at the GE junction.       Organs: Small hiatal hernia seen       No perisplenic fluid       Adrenal glands appear normal       No hydronephrosis on the left.  Hypodense nodule seen in the left kidney   measuring 1.3 cm, likely cyst       A few 1-2 mm stones are seen in the left kidney       No hydronephrosis on the right.  2 mm stone seen in the right kidney. Hypodense nodule projects posteriorly off the right kidney measuring 7 mm,   likely cyst.       No intrahepatic ductal dilatation.  Gallbladder is contracted       Drainage catheter is seen in the right upper quadrant, marginating staple   line.  Stranding and trace fluid is seen in the mesentery.  No measurable   fluid collection remaining.  This stranding and fluid tracks to the level the   gallbladder fossa.  Wall thickening of the gallbladder and stranding   surrounding gallbladder is less pronounced       No peripancreatic inflammatory change.       GI/Bowel:       Moderate stool seen in the colon.  Post colectomy changes are noted       Incidentally noted is a jejunal-jejunal intussusception in the left upper   quadrant.       Pelvis: No free fluid in pelvis.  No pelvic adenopathy       Lipoma seen in the thigh musculature on the right       Peritoneum/Retroperitoneum: Atherosclerotic change seen in aorta.  No aneurysm       Bones/Soft Tissues: Spurring is seen in the spine.  Bones appear unchanged.    A few tiny punctate sclerotic foci are seen, likely bone islands           Impression   Resolution of abscess near the anastomotic site in the right upper quadrant. Trace fluid and stranding remains       Wall thickening of the gallbladder and stranding surrounding gallbladder is   less pronounced       Bilateral nonobstructing renal calculi.       Transient jejunal-jejunal intussusception left upper quadrant             Assessment:    1. Suspected sleep apnea    - Home Sleep Study; Future    2. Carcinoma of ascending colon ([facility] [address]      3.  Uncontrolled hypertension          Plan:   · Patient was told about the clinical findings including auscultation and implications  · Patient's recent lab work was reviewed  · Patient has slightly increasing TSH which needs to be trended by the PCP and if patient has worsening TSH then patient may require some Synthroid  · Patient's blood pressure is not controlled at this time in spite of taking lisinopril and if the trend worsens or continues to like that and patient needs to discuss with PCP about titration of antihypertensives  · Patient is also having some chest tightness but not pain along with elevated blood pressure issues and has history of coronary disease in the family-if patient's symptoms continue would recommend patient to discuss with PCP about getting a stress test to make sure that patient does not have any coronary disease of concern  · Patient was also told about the possibility that he is not sleeping well may be because of his hours to go to micturate and patient was told to limit his fluid intake after 5 PM  · Patient does not give history of any prostatism at this time  · Patient was told about the pathophysiology and sequelae of YOLIE  · Home sleep study ordered for the patient  · Patient's further treatment depending on patient's clinical status, follow-up on above recommendation and sleep study results

## 2019-07-19 NOTE — PROGRESS NOTES
Regular rhythm and normal heart sounds. Exam reveals no gallop and no friction rub. No murmur heard. Pulmonary/Chest: Effort normal and breath sounds normal. He has no wheezes. He has no rales. Abdominal: Soft. Musculoskeletal: Normal range of motion. Neurological: He is alert and oriented to person, place, and time. Skin: Skin is warm and dry. Capillary refill takes 2 to 3 seconds. Psychiatric: He has a normal mood and affect. His behavior is normal. Judgment and thought content normal.   Nursing note and vitals reviewed. Assessment:       ICD-10-CM    1. Essential hypertension I10    2. Subclinical iodine-deficiency hypothyroidism E02 TSH without Reflex   3. Flank pain, chronic R10.9     G89.29    4. Sleep apnea, unspecified type G47.30          Plan:     1. Subclinical iodine-deficiency hypothyroidism  TSH 4.32 denies any losing of hair or any other symptoms of hypothyroidism   TSH without Reflex; Future    2. Flank pain, chronic  Left flank pain, wakes him up at night, instructed to drink an increased amount of fluid and avoid calcium producing foods such as cheese or ice cream    3. Essential hypertension  151/90 2 repeat was 142/96, patient complains of a frontal lobe headache. He continues to take his Zestoretic but will add a calcium channel blocker, Norvasc to his daily regime. He should call me on Monday let me know how he is feeling    4. Sleep apnea, unspecified type  Saw Adriano Goodwin  today for assessment of his sleep apnea. Physician feels that he may not have sleep apnea but states he will still get sleep study. I will call patient with results of his lab.   Follow-up in 6 months

## 2019-08-06 ENCOUNTER — TELEPHONE (OUTPATIENT)
Dept: PULMONOLOGY | Age: 54
End: 2019-08-06

## 2019-10-22 ENCOUNTER — OFFICE VISIT (OUTPATIENT)
Dept: FAMILY MEDICINE CLINIC | Age: 54
End: 2019-10-22
Payer: COMMERCIAL

## 2019-10-22 VITALS
RESPIRATION RATE: 16 BRPM | SYSTOLIC BLOOD PRESSURE: 136 MMHG | DIASTOLIC BLOOD PRESSURE: 88 MMHG | WEIGHT: 177 LBS | OXYGEN SATURATION: 97 % | BODY MASS INDEX: 27.72 KG/M2 | HEART RATE: 85 BPM | TEMPERATURE: 98.6 F

## 2019-10-22 DIAGNOSIS — L98.9 SKIN LESION: ICD-10-CM

## 2019-10-22 DIAGNOSIS — I10 HYPERTENSION, UNSPECIFIED TYPE: ICD-10-CM

## 2019-10-22 DIAGNOSIS — F51.01 PRIMARY INSOMNIA: ICD-10-CM

## 2019-10-22 PROCEDURE — 99214 OFFICE O/P EST MOD 30 MIN: CPT | Performed by: NURSE PRACTITIONER

## 2019-10-22 RX ORDER — FLUTICASONE PROPIONATE 50 MCG
2 SPRAY, SUSPENSION (ML) NASAL DAILY
Qty: 1 BOTTLE | Refills: 2 | Status: SHIPPED | OUTPATIENT
Start: 2019-10-22 | End: 2021-05-11 | Stop reason: SDUPTHER

## 2019-10-22 ASSESSMENT — PATIENT HEALTH QUESTIONNAIRE - PHQ9
1. LITTLE INTEREST OR PLEASURE IN DOING THINGS: 0
2. FEELING DOWN, DEPRESSED OR HOPELESS: 0
SUM OF ALL RESPONSES TO PHQ QUESTIONS 1-9: 0
SUM OF ALL RESPONSES TO PHQ QUESTIONS 1-9: 0
SUM OF ALL RESPONSES TO PHQ9 QUESTIONS 1 & 2: 0

## 2019-10-22 ASSESSMENT — ENCOUNTER SYMPTOMS
COUGH: 1
BLURRED VISION: 0

## 2019-11-05 ENCOUNTER — TELEPHONE (OUTPATIENT)
Dept: GASTROENTEROLOGY | Age: 54
End: 2019-11-05

## 2019-12-16 RX ORDER — LISINOPRIL AND HYDROCHLOROTHIAZIDE 25; 20 MG/1; MG/1
1 TABLET ORAL DAILY
Qty: 90 TABLET | Refills: 1 | Status: SHIPPED | OUTPATIENT
Start: 2019-12-16 | End: 2020-08-18 | Stop reason: SDUPTHER

## 2020-03-17 ENCOUNTER — TELEPHONE (OUTPATIENT)
Dept: GASTROENTEROLOGY | Age: 55
End: 2020-03-17

## 2020-03-17 NOTE — TELEPHONE ENCOUNTER
Pt states he is due for a 1 year colonoscopy with Dr. Araceli Villegas which I did confirm with the recalls. I informed patient we are not currently scheduling until May he verbalized understanding. Asked if we could reach out to him once we get the okay to schedule.

## 2020-05-14 ENCOUNTER — TELEPHONE (OUTPATIENT)
Dept: GASTROENTEROLOGY | Age: 55
End: 2020-05-14

## 2020-05-14 NOTE — LETTER
COLONOSCOPY PREP INSTRUCTIONS  MlRALAX SPLIT DOSE   [facility] PHYSICIAN ENDOSCOPY    Your colonoscopy is scheduled on: _6/12/2020 @ Tanya Hernandez Last  Arrival Time: __9:45 am_   DO NOT EAT OR DRINK (INCLUDING WATER) AFTER: _5:45 am - after drinking 2nd dose of prep_  's KEVIN PERSAUD [facility] Gastroenterology [phone]  [facility] Gastroenterology- [phone]    **Please remember to get tested for COVID-19 on 6/5/2020. The testing location information is [facility]    [place], [place], [place]achery Wu [place]  [phone]    Keep these papers together; REVIEW ALL OF THEM AT LEAST 7 DAYS BEFORE THE PROCEDURE. Please bring a current list of your medications, to avoid delays in the admission process. The following instructions must be followed in order to ensure your procedure has optimal outcomes. - KEEP YOUR APPOINTMENT. If for any reason, you are unable to keep your appointment, please notify us within 72 hours before your procedure. - You MUST have a responsible adult to drive you, who MUST remain at our facility the ENTIRE time. If not the procedure will be cancelled. You may go by taxi ONLY if you have a responsible adult with you. You may experience light headedness, dizziness etc., therefore you should have a responsible adult remain with you until the morning after your procedure. - Bring your insurance card and 's license. Call your insurance carrier to verify your benefits, and confirm that our facility is in your network, prior to the procedure date to ensure coverage. The facility name is listed as [facility] or [facility] [address]  and the tax ID# is [id number].  - Due to the safety and privacy of our patients, only one visitor is allowed in the recovery area after the procedure. The center will not be responsible for lost valuables so please leave them at home. - Try to avoid seeds (strawberries, dunia, and rye) for one week prior to your procedure. - If you have questions after beginning the prep, call between 8:30 am & 4:30pm you will speak to a nurse or medical assistant, after 4:30pm you will reach the physician on call. - Hydration (body fluid) is the most important aspect of an effective and safe prep. If you are not drinking enough fluid you may experience cramping, nausea and dizziness. - Common side effects of the prep are nausea, bloating and shaking chills. If any of these occur, take a break from the prep (30 minutes to 1 hour) and then restart. If unable to complete after that notify the Physician as your procedure may need to be cancelled. Nausea medication or an alternative prep is sometimes called in.  - Do not leave home after starting the prep. Frequent, liquid stools may begin as soon as 15 minutes after the first bottle, although it could take up to 4 hours or longer for your first bowel movement. - Even if your stools are clear and watery in appearance, TAKE ALL OF THE PREP.  - Using baby wipes and nonprescription ointments, such as Desitin, will help with the irritation caused by frequent loose stools. - Due to unforeseen schedule changes, you may be asked to move your appointment time to an earlier/later time slot. To insure that your prep gives you the best results for your Colonoscopy, Please follow all directions closely. 3-5 days prior to your procedure:  1. Begin a low-fiber diet (no corn, dried beans, tomatoes, nuts, seeds, lettuce). 2. No bran or bulking agents. 3. Stop taking your multivitamin or iron supplements.   4. If you currently take Aggrenox, Dipyridamole, Persantine, Fondaparinux sodium (Arixtra), Dalteparin sodium Earl Campos), Warfarin (Coumadin), Clopidogrel (Plavix), Prasugrel (Effient), Enoxaparin, Lovenox, or Heparin, Cilostazol (Pletal), Rivoroxaban (Xarelto), Desirudin (Iprivask), Dear Patient,      David Skelton will receive a call from the [facility] pre-registration department prior to your GI procedure. This will help streamline your check-in process on the day of service. During this call a skilled associate will review your demographic and insurance benefits information. The associate will answer any question pertaining to your insurance contract, such as deductible/co-insurance/ co-pay or any other financial concerns. They may offer an amount that you could pay on the day of surgery but this is not a requirement. Thank you for allowing [facility] Gastroenterology to be part of your [address] [facility]  [address], [address] Neil Knutson Michelle 630 [facility] 1163 Is located at the intersection of [address] [place] [address] and [facility], next to [facility]. From [address]: Exit at [address]. Travel on  [address] past [facility] and turn right onto [address].  Then turn left into the main driveway facing the [facility], bare to the right of the driveway and look for the building to the right of the hospital.    If you need further directions, pleae call our main number at [place] [address] [address] [facility] Gastroenterology [address] [place] [address] [phone]   [phone]    Jayden Greene MD                        [address] [place] [address] 5/15/20    10:06 AM    Facility: [facility]                                                             Procedure Date & Time: 2020 @ 10:45 am  Pt arrival: 9:45 am                                                                                    Patient Name:  Alysa Naik     :  1965 PCP:  PARVEZ Barbour - SHILPI [x]Titrate O2 to keep O2 Sat.  > 90%     [x]Discharge instructions per                                                                     Endoscopy Protocol     [x]Discharge home when awake and vital signs stable                                                                          Signature:         Date:5/15/20               Time: 10:06 AM   PHYSICIANS ORDERS

## 2020-05-15 RX ORDER — BISACODYL 5 MG
TABLET, DELAYED RELEASE (ENTERIC COATED) ORAL
Qty: 4 TABLET | Refills: 0 | Status: SHIPPED | OUTPATIENT
Start: 2020-05-15 | End: 2020-08-18 | Stop reason: ALTCHOICE

## 2020-05-15 RX ORDER — POLYETHYLENE GLYCOL 3350 17 G/17G
POWDER, FOR SOLUTION ORAL
Qty: 238 G | Refills: 0 | Status: SHIPPED | OUTPATIENT
Start: 2020-05-15 | End: 2020-08-18 | Stop reason: ALTCHOICE

## 2020-05-15 NOTE — TELEPHONE ENCOUNTER
[phone] (home)     Called and spoke with pt. Pt agreed to schedule his follow up colonoscopy. Pt aware of COVID-19 testing 7 days prior to his colonoscopy. Pt is scheduled for 6/12/2020 @ 10:45 am with Dr. Valente Carbajal @ [address]. Prep medication sent to Dr. Valente Carbajal. Prep instructions mailed to pt.

## 2020-06-09 ENCOUNTER — OFFICE VISIT (OUTPATIENT)
Dept: PRIMARY CARE CLINIC | Age: 55
End: 2020-06-09

## 2020-06-10 LAB
SARS-COV-2: NOT DETECTED
SOURCE: NORMAL

## 2020-06-11 NOTE — RESULT ENCOUNTER NOTE
Please contact patient with their testing results: Your test for COVID-19, also known as novel coronavirus, came back negative. No virus was detected from the sample collected. Until your symptoms are fully resolved, you may still be contagious. We recommend that you remain isolated for 7 days minimum or 72 hours after your symptoms have completely resolved, whichever is longer. Continually monitor symptoms. Contact a medical provider if symptoms are worsening. If you have any additional questions, contact your PCP.     For additional information, please visit the Centers for Disease Control and Prevention   [email or web address]

## 2020-06-12 ENCOUNTER — TELEPHONE (OUTPATIENT)
Dept: GASTROENTEROLOGY | Age: 55
End: 2020-06-12

## 2020-06-12 ENCOUNTER — HOSPITAL ENCOUNTER (OUTPATIENT)
Age: 55
Setting detail: OUTPATIENT SURGERY
Discharge: HOME | End: 2020-06-12
Attending: INTERNAL MEDICINE | Admitting: INTERNAL MEDICINE
Payer: COMMERCIAL

## 2020-06-12 VITALS
DIASTOLIC BLOOD PRESSURE: 92 MMHG | WEIGHT: 180 LBS | TEMPERATURE: 97.5 F | HEIGHT: 69 IN | SYSTOLIC BLOOD PRESSURE: 126 MMHG | RESPIRATION RATE: 14 BRPM | HEART RATE: 75 BPM | BODY MASS INDEX: 26.66 KG/M2 | OXYGEN SATURATION: 93 %

## 2020-06-12 PROCEDURE — 99153 MOD SED SAME PHYS/QHP EA: CPT | Performed by: INTERNAL MEDICINE

## 2020-06-12 PROCEDURE — 45385 COLONOSCOPY W/LESION REMOVAL: CPT | Performed by: INTERNAL MEDICINE

## 2020-06-12 PROCEDURE — 6360000002 HC RX W HCPCS: Performed by: INTERNAL MEDICINE

## 2020-06-12 PROCEDURE — 99152 MOD SED SAME PHYS/QHP 5/>YRS: CPT | Performed by: INTERNAL MEDICINE

## 2020-06-12 PROCEDURE — 7100000011 HC PHASE II RECOVERY - ADDTL 15 MIN: Performed by: INTERNAL MEDICINE

## 2020-06-12 PROCEDURE — 2709999900 HC NON-CHARGEABLE SUPPLY: Performed by: INTERNAL MEDICINE

## 2020-06-12 PROCEDURE — 2580000003 HC RX 258: Performed by: INTERNAL MEDICINE

## 2020-06-12 PROCEDURE — 7100000010 HC PHASE II RECOVERY - FIRST 15 MIN: Performed by: INTERNAL MEDICINE

## 2020-06-12 PROCEDURE — 88305 TISSUE EXAM BY PATHOLOGIST: CPT

## 2020-06-12 PROCEDURE — 3609010600 HC COLONOSCOPY POLYPECTOMY SNARE/COLD BIOPSY: Performed by: INTERNAL MEDICINE

## 2020-06-12 RX ORDER — AMITRIPTYLINE HYDROCHLORIDE 25 MG/1
25 TABLET, FILM COATED ORAL NIGHTLY
COMMUNITY
End: 2020-08-18 | Stop reason: SDUPTHER

## 2020-06-12 RX ORDER — DIPHENHYDRAMINE HYDROCHLORIDE 50 MG/ML
INJECTION INTRAMUSCULAR; INTRAVENOUS PRN
Status: DISCONTINUED | OUTPATIENT
Start: 2020-06-12 | End: 2020-06-12 | Stop reason: ALTCHOICE

## 2020-06-12 RX ORDER — PANTOPRAZOLE SODIUM 20 MG/1
20 TABLET, DELAYED RELEASE ORAL DAILY
Qty: 90 TABLET | Refills: 3 | Status: SHIPPED | OUTPATIENT
Start: 2020-06-12 | End: 2020-08-18 | Stop reason: SDUPTHER

## 2020-06-12 RX ORDER — PANTOPRAZOLE SODIUM 20 MG/1
20 TABLET, DELAYED RELEASE ORAL DAILY
Qty: 30 TABLET | Refills: 11 | Status: SHIPPED | OUTPATIENT
Start: 2020-06-12 | End: 2020-06-12 | Stop reason: SDUPTHER

## 2020-06-12 RX ORDER — MIDAZOLAM HYDROCHLORIDE 5 MG/ML
INJECTION INTRAMUSCULAR; INTRAVENOUS PRN
Status: DISCONTINUED | OUTPATIENT
Start: 2020-06-12 | End: 2020-06-12 | Stop reason: ALTCHOICE

## 2020-06-12 RX ORDER — SODIUM CHLORIDE 9 MG/ML
INJECTION INTRAVENOUS PRN
Status: DISCONTINUED | OUTPATIENT
Start: 2020-06-12 | End: 2020-06-12 | Stop reason: ALTCHOICE

## 2020-06-12 RX ORDER — SODIUM CHLORIDE, SODIUM LACTATE, POTASSIUM CHLORIDE, CALCIUM CHLORIDE 600; 310; 30; 20 MG/100ML; MG/100ML; MG/100ML; MG/100ML
INJECTION, SOLUTION INTRAVENOUS ONCE
Status: COMPLETED | OUTPATIENT
Start: 2020-06-12 | End: 2020-06-12

## 2020-06-12 RX ORDER — FENTANYL CITRATE 50 UG/ML
INJECTION, SOLUTION INTRAMUSCULAR; INTRAVENOUS PRN
Status: DISCONTINUED | OUTPATIENT
Start: 2020-06-12 | End: 2020-06-12 | Stop reason: ALTCHOICE

## 2020-06-12 RX ADMIN — SODIUM CHLORIDE, POTASSIUM CHLORIDE, SODIUM LACTATE AND CALCIUM CHLORIDE: 600; 310; 30; 20 INJECTION, SOLUTION INTRAVENOUS at 08:21

## 2020-06-12 ASSESSMENT — PAIN SCALES - GENERAL
PAINLEVEL_OUTOF10: 0

## 2020-06-12 ASSESSMENT — PAIN - FUNCTIONAL ASSESSMENT: PAIN_FUNCTIONAL_ASSESSMENT: 0-10

## 2020-06-12 NOTE — PROGRESS NOTES
Preoperative Screening for Elective Surgery/Invasive Procedures While COVID-19 present in the community     Have you tested positive or have been told to self-isolate for COVID-19 like symptoms within the past 28 days?  Do you currently have any of the following symptoms? o Fever >100.0 F or 99.9 F in immunocompromised patients? o New onset cough, shortness of breath or difficulty breathing?  o New onset sore throat, myalgia (muscle aches and pains), headache, loss of taste/smell or diarrhea?  Have you had a potential exposure to COVID-19 within the past 14 days by:  o Close contact with a confirmed case? o Close contact with a healthcare worker,  or essential infrastructure worker (grocery store, [employer], gas station, public utilities or transportation)? o Do you reside in a congregate setting such as; skilled nursing facility, adult home, correctional facility, homeless shelter or other institutional setting?  o Have you had recent travel to a known COVID-19 hotspot? Indicate if the patient has a positive screen by answering yes to one or more of the above questions. Patients who test positive or screen positive prior to surgery or on the day of surgery should be evaluated in conjunction with the surgeon/proceduralist/anesthesiologist to determine the urgency of the procedure.        No to all questions

## 2020-06-12 NOTE — H&P
Worry: Not on file     Inability: Not on file    Transportation needs     Medical: Not on file     Non-medical: Not on file   Tobacco Use    Smoking status: Never Smoker    Smokeless tobacco: Never Used   Substance and Sexual Activity    Alcohol use: Not Currently     Frequency: Never     Comment: rarely    Drug use: Never    Sexual activity: Not on file   Lifestyle    Physical activity     Days per week: Not on file     Minutes per session: Not on file    Stress: Not on file   Relationships    Social connections     Talks on phone: Not on file     Gets together: Not on file     Attends [employer] service: Not on file     Active member of club or organization: Not on file     Attends meetings of clubs or organizations: Not on file     Relationship status: Not on file    Intimate partner violence     Fear of current or ex partner: Not on file     Emotionally abused: Not on file     Physically abused: Not on file     Forced sexual activity: Not on file   Other Topics Concern    Not on file   Social History Narrative    Not on file     SURGICAL HISTORY     Past Surgical History:   Procedure Laterality Date    APPENDECTOMY      COLON SURGERY      I & D of colon abscess [post op    COLONOSCOPY N/A 4/4/2019    COLONOSCOPY WITH BIOPSY performed by Radha Boston MD at [place] [address]  4/4/2019    COLONOSCOPY POLYPECTOMY SNARE/COLD BIOPSY performed by Radha Boston MD at [address] 4/16/2019    ROBOTIC LAPAROSCOPIC RIGHT COLECTOMY CPT CODE - 49345 performed by Katie Lunsford MD at [address] [place] [address] 4/4/2019    EGD performed by Radha Boston MD at [facility] [facility]   (This list may include medications prescribed during this encounter as epic can not insert only the list prior to this encounter.)  No current facility-administered medications on file prior to encounter. Current Outpatient Medications on File Prior to Encounter   Medication Sig Dispense Refill    amitriptyline (ELAVIL) 25 MG tablet Take 25 mg by mouth nightly      polyethylene glycol (GLYCOLAX) 17 GM/SCOOP powder Use as directed for colonoscopy prep 238 g 0    bisacodyl (BISACODYL) 5 MG EC tablet Take all 4 tablets day before colonoscopy 4 tablet 0    lisinopril-hydrochlorothiazide (PRINZIDE;ZESTORETIC) 20-25 MG per tablet Take 1 tablet by mouth daily 90 tablet 1    amLODIPine (NORVASC) 5 MG tablet Take 1 tablet by mouth daily 90 tablet 3    fluticasone (FLONASE ALLERGY RELIEF) 50 MCG/ACT nasal spray 2 sprays by Nasal route daily 1 Bottle 2    omeprazole (PRILOSEC) 40 MG delayed release capsule Take 1 capsule by mouth every morning (before breakfast) 90 capsule 3     ALLERGIES     Allergies   Allergen Reactions    Prednisone Nausea And Vomiting     IMMUNIZATIONS     There is no immunization history on file for this patient. REVIEW OF SYSTEMS   See HPI for further details and pertinent postiives. Negative for the following:  Constitutional: Negative for weight change. Negative for appetite change and fatigue. HENT: Negative for nosebleeds, sore throat, mouth sores, and voice change. Respiratory: Negative for cough, choking and chest tightness. Cardiovascular: Negative for chest pain   Gastrointestinal: See HPI  Musculoskeletal: Negative for arthralgias. Skin: Negative for pallor. Neurological: Negative for weakness and light-headedness. Hematological: Negative for adenopathy. Does not bruise/bleed easily. Psychiatric/Behavioral: Negative for suicidal ideas.    PHYSICAL EXAM   VITAL SIGNS:  Vitals:    06/12/20 0805   BP: (!) 152/93   Pulse: 81   Resp: 18   Temp: 97.5 °F (36.4 °C)   SpO2: 99%     Wt Readings from Last 3 Encounters:   06/12/20 180 lb (81.6 kg)   10/22/19 177 lb (80.3 kg)   07/19/19 168 lb (76.2 kg)     Constitutional: Well developed, Well nourished, No acute distress, Non-toxic appearance. HENT: Normocephalic, Atraumatic, Bilateral external ears normal, Oropharynx moist, No oral exudates, Nose normal.   Eyes: Conjunctiva normal, No discharge. Neck: Normal range of motion, No tenderness, Supple, No stridor. Lymphatic: No lymphadenopathy noted. Cardiovascular: Normal heart rate, Normal rhythm, No murmurs, No rubs, No gallops. Thorax & Lungs: Normal breath sounds, No respiratory distress, No wheezing, No chest tenderness. Abdomen: scars consistent with stated surgeries,  Soft NTND   Rectal:  Deferred. Skin: Warm, Dry, No erythema, No rash. Back: No tenderness, No CVA tenderness. Extremities: Intact distal pulses, No edema, No tenderness, No cyanosis, No clubbing. Neurologic: Alert & oriented x 3, Normal motor function, Normal sensory function, No focal deficits noted.    RADIOLOGY/PROCEDURES     Reviewed per [address] reviewed per epic    FOLLOWUP     Personal history of colon Colonoscopy          KARY Talbertja 21 6/12/20 8:32 AM EDT    CC:  PARVEZ Cabezas - CNP

## 2020-07-17 ENCOUNTER — HOSPITAL ENCOUNTER (OUTPATIENT)
Age: 55
Discharge: HOME | End: 2020-07-17
Payer: COMMERCIAL

## 2020-07-17 ENCOUNTER — HOSPITAL ENCOUNTER (OUTPATIENT)
Dept: CT IMAGING | Age: 55
Discharge: HOME | End: 2020-07-17
Payer: COMMERCIAL

## 2020-07-17 LAB
BUN BLDV-MCNC: 19 MG/DL (ref 7–20)
CREAT SERPL-MCNC: 0.8 MG/DL (ref 0.9–1.3)
GFR AFRICAN AMERICAN: >60
GFR NON-AFRICAN AMERICAN: >60

## 2020-07-17 PROCEDURE — 74177 CT ABD & PELVIS W/CONTRAST: CPT

## 2020-07-17 PROCEDURE — 82565 ASSAY OF CREATININE: CPT

## 2020-07-17 PROCEDURE — 84520 ASSAY OF UREA NITROGEN: CPT

## 2020-07-17 PROCEDURE — 6360000004 HC RX CONTRAST MEDICATION: Performed by: INTERNAL MEDICINE

## 2020-07-17 PROCEDURE — 36415 COLL VENOUS BLD VENIPUNCTURE: CPT

## 2020-07-17 RX ADMIN — IOHEXOL 50 ML: 240 INJECTION, SOLUTION INTRATHECAL; INTRAVASCULAR; INTRAVENOUS; ORAL at 15:47

## 2020-07-17 RX ADMIN — IOPAMIDOL 75 ML: 755 INJECTION, SOLUTION INTRAVENOUS at 15:47

## 2020-08-11 NOTE — TELEPHONE ENCOUNTER
Future Appointments   Date Time Provider Leonila Price   8/18/2020 10:00 AM Steffi Bean, APRN - CNP [facility] [address]

## 2020-08-12 RX ORDER — AMLODIPINE BESYLATE 5 MG/1
5 TABLET ORAL DAILY
Qty: 90 TABLET | Refills: 3 | Status: SHIPPED | OUTPATIENT
Start: 2020-08-12 | End: 2020-08-18 | Stop reason: SDUPTHER

## 2020-08-18 ENCOUNTER — VIRTUAL VISIT (OUTPATIENT)
Dept: FAMILY MEDICINE CLINIC | Age: 55
End: 2020-08-18
Payer: COMMERCIAL

## 2020-08-18 PROCEDURE — 99214 OFFICE O/P EST MOD 30 MIN: CPT | Performed by: NURSE PRACTITIONER

## 2020-08-18 RX ORDER — LISINOPRIL AND HYDROCHLOROTHIAZIDE 25; 20 MG/1; MG/1
1 TABLET ORAL DAILY
Qty: 90 TABLET | Refills: 1 | Status: SHIPPED | OUTPATIENT
Start: 2020-08-18 | End: 2020-08-21 | Stop reason: SDUPTHER

## 2020-08-18 RX ORDER — AMLODIPINE BESYLATE 5 MG/1
5 TABLET ORAL DAILY
Qty: 90 TABLET | Refills: 3 | Status: SHIPPED | OUTPATIENT
Start: 2020-08-18 | End: 2021-06-18 | Stop reason: SDUPTHER

## 2020-08-18 RX ORDER — PANTOPRAZOLE SODIUM 20 MG/1
20 TABLET, DELAYED RELEASE ORAL DAILY
Qty: 90 TABLET | Refills: 3 | Status: SHIPPED | OUTPATIENT
Start: 2020-08-18 | End: 2021-08-27

## 2020-08-18 RX ORDER — AMITRIPTYLINE HYDROCHLORIDE 25 MG/1
25 TABLET, FILM COATED ORAL NIGHTLY
Qty: 30 TABLET | Refills: 3 | Status: SHIPPED | OUTPATIENT
Start: 2020-08-18 | End: 2021-01-07 | Stop reason: SDUPTHER

## 2020-08-18 ASSESSMENT — PATIENT HEALTH QUESTIONNAIRE - PHQ9
2. FEELING DOWN, DEPRESSED OR HOPELESS: 0
1. LITTLE INTEREST OR PLEASURE IN DOING THINGS: 0
SUM OF ALL RESPONSES TO PHQ QUESTIONS 1-9: 0
SUM OF ALL RESPONSES TO PHQ QUESTIONS 1-9: 0
SUM OF ALL RESPONSES TO PHQ9 QUESTIONS 1 & 2: 0

## 2020-08-18 ASSESSMENT — ENCOUNTER SYMPTOMS
COUGH: 0
SHORTNESS OF BREATH: 0
RESPIRATORY NEGATIVE: 1

## 2020-08-18 NOTE — PROGRESS NOTES
2020    TELEHEALTH EVALUATION -- Audio/Visual (During KMBUR-23 public health emergency)    HPI:    Lilibeth Quiñones (:  1965) has requested an audio/video evaluation for the following concern(s):refill meds    Pt has been  Out of his meds since last week. States his BP has been doing great. He feels fine. We reviewed the results of his CT abd/pelvis an chest, normal. The colonoscopy dine in  shows one malignant polyp removed. Next colonoscopy in 3 years. He should contact Dr. Rudy Partida if he has any further questions    Review of Systems   Respiratory: Negative. Negative for cough and shortness of breath. Cardiovascular: Negative. Negative for chest pain, palpitations and leg swelling. Hypertension 136/88 controlled with Zestoretic and amlodipine   Gastrointestinal:        History of colon cancer malignant neoplasm of ascending colon. Patient underwent surgery and chemotherapy and doing well now. History of GERD treated with a PPI   Allergic/Immunologic: Positive for environmental allergies (Flonase). Psychiatric/Behavioral: Positive for sleep disturbance (Amitriptyline). Prior to Visit Medications    Medication Sig Taking?  Authorizing Provider   amLODIPine (NORVASC) 5 MG tablet Take 1 tablet by mouth daily Yes PARVEZ Boyle CNP   amitriptyline (ELAVIL) 25 MG tablet Take 1 tablet by mouth nightly Yes PARVEZ Boyle CNP   pantoprazole (PROTONIX) 20 MG tablet Take 1 tablet by mouth daily Yes PARVEZ Boyle CNP   lisinopril-hydroCHLOROthiazide (PRINZIDE;ZESTORETIC) 20-25 MG per tablet Take 1 tablet by mouth daily Yes PARVEZ Boyle CNP   fluticasone (FLONASE ALLERGY RELIEF) 50 MCG/ACT nasal spray 2 sprays by Nasal route daily  Patient not taking: Reported on 2020  PARVEZ Boyle CNP       Social History     Tobacco Use    Smoking status: Never Smoker    Smokeless tobacco: Never Used   Substance Use visit) encounter to address concerns as mentioned above. A caregiver was present when appropriate. Due to this being a TeleHealth encounter (During CDUQP-35 public health emergency), evaluation of the following organ systems was limited: Vitals/Constitutional/EENT/Resp/CV/GI//MS/Neuro/Skin/Heme-Lymph-Imm. Pursuant to the emergency declaration under the [address] [place] [address] and the [employer] and [employer] Act, this Virtual Visit was conducted with patient's (and/or legal guardian's) consent, to reduce the patient's risk of exposure to COVID-19 and provide necessary medical care. The patient (and/or legal guardian) has also been advised to contact this office for worsening conditions or problems, and seek emergency medical treatment and/or call 911 if deemed necessary. Patient identification was verified at the start of the visit: Yes    Total time spent on this encounter: 15-25    Services were provided through a video synchronous discussion virtually to substitute for in-person clinic visit. Patient and provider were located at their individual homes. --PARVEZ Bernabe CNP on 8/18/2020 at 10:34 AM    An electronic signature was used to authenticate this note.

## 2020-08-21 RX ORDER — LISINOPRIL AND HYDROCHLOROTHIAZIDE 25; 20 MG/1; MG/1
1 TABLET ORAL DAILY
Qty: 90 TABLET | Refills: 1 | Status: SHIPPED | OUTPATIENT
Start: 2020-08-21 | End: 2021-05-07

## 2020-10-08 ENCOUNTER — TELEPHONE (OUTPATIENT)
Dept: GASTROENTEROLOGY | Age: 55
End: 2020-10-08

## 2020-10-08 NOTE — TELEPHONE ENCOUNTER
Had cancer 1 year ago, removed 11-12 inches of colon  Colonoscopy was just done by you a few months ago    Pt complains of being very uncomfortable, feels like 3 fingers pressing in side, dull pressure and a burning pain in side. This has been going on for 3 weeks and is getting worst, past few days has been the worst and when he eats a bug meal it gets a lot worse.      Please advise

## 2020-10-13 ENCOUNTER — VIRTUAL VISIT (OUTPATIENT)
Dept: GASTROENTEROLOGY | Age: 55
End: 2020-10-13
Payer: COMMERCIAL

## 2020-10-13 PROCEDURE — 99214 OFFICE O/P EST MOD 30 MIN: CPT | Performed by: INTERNAL MEDICINE

## 2020-10-13 RX ORDER — PANTOPRAZOLE SODIUM 20 MG/1
20 TABLET, DELAYED RELEASE ORAL
Qty: 60 TABLET | Refills: 1 | Status: SHIPPED | OUTPATIENT
Start: 2020-10-13 | End: 2020-10-14 | Stop reason: SDUPTHER

## 2020-10-13 NOTE — PROGRESS NOTES
[address] [facility],  [address] [place] [address] Ave  [place], [address] [place] [address]  Phone: [phone]   Fax:[phone]    CHIEF COMPLAINT     TELEHEALTH EVALUATION -- Audio/Visual (During PQWPL-45 public health emergency)    HPI (location/symptom, timing/onset, duration, quality/severity, context, modifying factors, and associated signs/symptoms)     Thank you PARVEZ Jacobs - CNP for asking me to see Pedro Stone in consultation. He is a  [2] White [1] 47 y.o. . male seen independently who presents with the following GI complaints:    Pedro Stone (:  1965) has requested an audio/video evaluation for the following concern(s):      Chief Complaint   Patient presents with    Follow-up     discomfort and burning      Complains of moderate luq pain just under the rib cage discomfort and burning without associated heartburn, dysphagia, cough, chest pain, globus, regurgitation, nausea, or vomiting. Modifying factors remains on chronic protonix for symptomatic heartburn if he misses doses and requests refills. Did not require chemoradiation for his colon cancer last year and most recent CT 3 months ago without evidence of mets. Following a gerd diet. Has grown a alba since he was last seen. Has not had recent labs. CT CHEST ABDOMEN PELVIS W CONTRAST  Narrative: EXAMINATION:  CT OF THE CHEST, ABDOMEN, AND PELVIS WITH CONTRAST 2020 3:40 pm    TECHNIQUE:  CT of the chest, abdomen and pelvis was performed with the administration of  intravenous contrast. Multiplanar reformatted images are provided for review. Dose modulation, iterative reconstruction, and/or weight based adjustment of  the mA/kV was utilized to reduce the radiation dose to as low as reasonably  achievable.     COMPARISON:  CT abdomen and pelvis 2019, CT chest 2019    HISTORY:  ORDERING SYSTEM PROVIDED HISTORY: Carcinoma of ascending colon [facility])  TECHNOLOGIST PROVIDED HISTORY:  Additional Contrast?->Oral  Reason for exam:->staging  Reason for Exam: Carcinoma of ascending colon  Acuity: Chronic  Type of Exam: Subsequent/Follow-up    FINDINGS:    Chest:    Mediastinum: No mediastinal adenopathy. Thoracic aorta normal in caliber. Aberrant right subclavian artery noted. No pericardial effusion    Lungs/pleura: No suspicious pulmonary nodule. Atelectasis in the lung bases. No pleural effusion or pleural tumor    Soft Tissues/Bones: No axillary adenopathy. Stable sclerotic lesion of the  head of the right 8th rib. No new suspicious bone lesion    Abdomen/Pelvis:    Organs: Small nonobstructing bilateral renal calculi. 0.5 cm right renal  cyst.  Remaining solid organs and gallbladder unremarkable. Specifically, no  hepatic or adrenal masses    GI/Bowel: Status post right hemicolectomy. Diverticula of the sigmoid. No  other intrinsic gastrointestinal abnormality. No enlarged mesenteric lymph  nodes    Pelvis: Urinary bladder unremarkable. No pelvic adenopathy. Small bilateral  fat containing inguinal hernias    Peritoneum/Retroperitoneum: Interval removal of pigtail drainage catheter  from the right flank region. No recurrent fluid collection. No ascites. No  retroperitoneal adenopathy. Aorta unremarkable    Bones/Soft Tissues: No suspicious bone lesion. No groin adenopathy  Impression: 1. No evidence of intrathoracic or abdominopelvic metastatic disease  2. Stable sclerotic lesion of the head of the right 8th rib, likely a bone  island. 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 He recently received some steroids for tendinitis along with a few days of nsaids and developed suprapubic pain along with 1 dark stool with flecks of blood. No heartburn, dysphagia, weight loss. Father had stage 4 cancer of unknown primary.  Received call from his pcp last night to go to ER for critical Hg of 6.3. Not dizzy, sob, etc.  No abdominal pain at this time. Pertinent PMH, FH, SH is reviewed below. Last EGD/colonoscopy:4/4/2019 -normal esophagus, stomach, and duodenum  -blood, Minimal in amount, located in the fundus  -esophagitis, grade 1   -diverticulosis, mild in degree, involving the sigmoid  -polyp(s) - #1, pedunculated 10 mm in size, located in the transverse colon, removed by cold snare and retrieved for pathology  -apple core tumor/mass located in the presumed ascending colon. Could not advance scope through it; biopsies were obtained  Last Colonoscopy: 6/12/2020 -diverticulosis, mild in degree, involving the sigmoid  -polyp(s) - #1, 5 mm in size, located in the descending colon, removed by cold snare and retrieved for pathology  -evidence of previous right hemicolectomy    Review of available records reveals:   Wt Readings from Last 50 Encounters:   06/12/20 180 lb (81.6 kg)   10/22/19 177 lb (80.3 kg)   07/19/19 168 lb (76.2 kg)   07/19/19 167 lb (75.8 kg)   07/05/19 169 lb (76.7 kg)   06/10/19 163 lb (73.9 kg)   05/23/19 157 lb (71.2 kg)   05/20/19 159 lb (72.1 kg)   05/06/19 158 lb 12.8 oz (72 kg)   04/26/19 168 lb (76.2 kg)   04/21/19 160 lb 12.8 oz (72.9 kg)   04/11/19 171 lb (77.6 kg)   04/11/19 167 lb 3.2 oz (75.8 kg)   04/08/19 171 lb 6.4 oz (77.7 kg)   04/04/19 171 lb (77.6 kg)   04/02/19 171 lb (77.6 kg)   04/01/19 172 lb (78 kg)       No components found for: HGBA1C  BP Readings from Last 3 Encounters:   06/12/20 (!) 126/92   10/22/19 136/88   07/19/19 (!) 142/96     Health Maintenance   Topic Date Due    Hepatitis C screen  1965    HIV screen  11/24/1980    DTaP/Tdap/Td vaccine (1 - Tdap) 11/24/1984    Diabetes screen  11/24/2005    Shingles Vaccine (1 of 2) 11/24/2015    Potassium monitoring  07/05/2020    Flu vaccine (1) 09/01/2020    Creatinine monitoring  07/17/2021    Colon cancer screen colonoscopy  06/12/2023    Lipid screen  04/01/2024    activity: Not on file   Other Topics Concern    Not on file   Social History Narrative    Not on file     SURGICAL HISTORY     Past Surgical History:   Procedure Laterality Date    APPENDECTOMY      COLON SURGERY      I & D of colon abscess [post op    COLONOSCOPY N/A 4/4/2019    COLONOSCOPY WITH BIOPSY performed by Reba Peters MD at [facility]  4/4/2019    COLONOSCOPY POLYPECTOMY SNARE/COLD BIOPSY performed by Reba Peters MD at [facility] N/A 6/12/2020    COLONOSCOPY POLYPECTOMY SNARE performed by Héctor Wolfe MD at [address] Right 4/16/2019    ROBOTIC LAPAROSCOPIC RIGHT COLECTOMY CPT CODE - 49465 performed by Sonido Sood MD at [address] [facility] [address] N/A 4/4/2019    EGD performed by Reba Peters MD at [facility]   (This list may include medications prescribed during this encounter as epic can not insert only the list prior to this encounter.)  Current Outpatient Rx   Medication Sig Dispense Refill    lisinopril-hydroCHLOROthiazide (PRINZIDE;ZESTORETIC) 20-25 MG per tablet Take 1 tablet by mouth daily 90 tablet 1    amLODIPine (NORVASC) 5 MG tablet Take 1 tablet by mouth daily 90 tablet 3    amitriptyline (ELAVIL) 25 MG tablet Take 1 tablet by mouth nightly 30 tablet 3    pantoprazole (PROTONIX) 20 MG tablet Take 1 tablet by mouth daily 90 tablet 3    fluticasone (FLONASE ALLERGY RELIEF) 50 MCG/ACT nasal spray 2 sprays by Nasal route daily 1 Bottle 2    pantoprazole (PROTONIX) 20 MG tablet Take 1 tablet by mouth 2 times daily (before meals) 180 tablet 0     ALLERGIES     Allergies   Allergen Reactions    Prednisone Nausea And Vomiting     IMMUNIZATIONS     There is no immunization history on file for this patient.   REVIEW OF SYSTEMS (2-9 systems for level 4, 10 or more for level 5)   See HPI for further details and pertinent postiives. Negative for the following:  Constitutional: Negative for weight change. Negative for appetite change and fatigue. HENT: Negative for nosebleeds, sore throat, mouth sores, and voice change. Respiratory: Negative for cough, choking and chest tightness. Cardiovascular: Negative for chest pain   Gastrointestinal: No abdominal pain, heartburn, bloating, dysphagia, cough, chest pain, globus, regurgitation, diarrhea, constipation, nausea, or vomiting. Positive for luq burning pain. Musculoskeletal: Negative for arthralgias. Skin: Negative for pallor. Neurological: Negative for weakness and light-headedness. Hematological: Negative for adenopathy. Does not bruise/bleed easily. Psychiatric/Behavioral: Negative for suicidal ideas. PHYSICAL EXAM (7 for level 4, 8 or more for level 5)   VITAL SIGNS: There were no vitals taken for this visit. Wt Readings from Last 3 Encounters:   06/12/20 180 lb (81.6 kg)   10/22/19 177 lb (80.3 kg)   07/19/19 168 lb (76.2 kg)     Constitutional: Well developed, Well nourished, No acute distress, Non-toxic appearance. HENT: Normocephalic, Atraumatic, Bilateral external ears normal, Nose normal.   Eyes: Conjunctiva normal.   Neck: Normal range of motion  Thorax & Lungs: No respiratory distress  Skin: No obvious rash from the chest up  Neurologic: Alert & oriented x 3  Due to this being a TeleHealth encounter, evaluation of the following organ systems is limited: Vitals/Constitutional/EENT/Resp/CV/GI//MS/Neuro/Skin/Heme-Lymph-Imm. RADIOLOGY/PROCEDURES       FINAL IMPRESSION   No orders of the defined types were placed in this encounter. Alejandrina Mojica was seen today for follow-up. Diagnoses and all orders for this visit:    LUQ pain  -     Discontinue: pantoprazole (PROTONIX) 20 MG tablet; Take 1 tablet by mouth 2 times daily (before meals)    Discussed potential additional workup with continued symptoms despite increasing his ppi.   ORDERED FUTURE/PENDING TESTS Lab Frequency Next Occurrence   CT CHEST ABDOMEN PELVIS W CONTRAST Once 11/22/2019   COVID-19 Ambulatory Once 05/15/2020     Services were provided through a video synchronous discussion virtually with JAYA SALEEM to substitute for in-person clinic visit. Patient and provider were located at their individual homes/office. FOLLOWUP   Return 1 month in office if continued symptoms. Juan Alberto Caldera 10/16/20 5:32 AM EDT    CC:  Lenin Berry, APRN - CNP  9}  Pursuant to the emergency declaration under the [facility] waiver authority and the Coronavirus Preparedness and [employer] Act, this Virtual  Visit was conducted, with patient's consent, to reduce the patient's risk of exposure to COVID-19 and provide continuity of care for an established patient. Time spent: 25 minutes    Services were provided through a video synchronous discussion virtually to substitute for in-person clinic visit.

## 2020-10-14 RX ORDER — PANTOPRAZOLE SODIUM 20 MG/1
20 TABLET, DELAYED RELEASE ORAL
Qty: 180 TABLET | Refills: 0 | Status: SHIPPED | OUTPATIENT
Start: 2020-10-14 | End: 2021-02-08 | Stop reason: SDUPTHER

## 2021-01-07 ENCOUNTER — OFFICE VISIT (OUTPATIENT)
Dept: GASTROENTEROLOGY | Age: 56
End: 2021-01-07
Payer: COMMERCIAL

## 2021-01-07 VITALS
BODY MASS INDEX: 29.65 KG/M2 | HEART RATE: 94 BPM | HEIGHT: 69 IN | DIASTOLIC BLOOD PRESSURE: 86 MMHG | WEIGHT: 200.2 LBS | TEMPERATURE: 97.7 F | SYSTOLIC BLOOD PRESSURE: 129 MMHG

## 2021-01-07 DIAGNOSIS — K58.0 IRRITABLE BOWEL SYNDROME WITH DIARRHEA: Primary | ICD-10-CM

## 2021-01-07 DIAGNOSIS — G47.00 INSOMNIA, UNSPECIFIED TYPE: ICD-10-CM

## 2021-01-07 PROCEDURE — 99213 OFFICE O/P EST LOW 20 MIN: CPT | Performed by: INTERNAL MEDICINE

## 2021-01-07 RX ORDER — AMITRIPTYLINE HYDROCHLORIDE 50 MG/1
50 TABLET, FILM COATED ORAL NIGHTLY
Qty: 30 TABLET | Refills: 11 | Status: SHIPPED | OUTPATIENT
Start: 2021-01-07 | End: 2021-08-27 | Stop reason: SDUPTHER

## 2021-01-07 NOTE — PROGRESS NOTES
[id number] [facility],  [address] [place] [address] Ave  Almo, Madhavi[address] [place] [address]  Phone: [phone]   [facility]     Chief Complaint   Patient presents with    Follow-up     left side pain worse after eating       HPI (location/symptom, timing/onset, duration, quality/severity, context, modifying factors, and associated signs/symptoms)     Thank you PARVEZ Curtis - CNP for asking me to see Estefanía Duong in consultation. He is a  [2] White [1] 54 y.o. . male seen independently who presents with the following GI complaints:    Estefanía Duong  Continues to experience luq pain and swelling/bloating. Will usually be fine when he wakes then get progressively worse at the day progresses and he eats. Feels like there is a bottle neck that needs to open. Tends toward loose stool or diarrhea since colectomy. No better with protonix which does help heartburn. No better with low gas diet. Was recently prescribed elavil for sleep which has not helped his abdominal complaints or sleep. Last Encounter Reviewed: 10/13/2020   Complains of moderate luq pain just under the rib cage discomfort and burning without associated heartburn, dysphagia, cough, chest pain, globus, regurgitation, nausea, or vomiting. Modifying factors remains on chronic protonix for symptomatic heartburn if he misses doses and requests refills. Did not require chemoradiation for his colon cancer last year and most recent CT 3 months ago without evidence of mets. Following a gerd diet. Has grown a alba since he was last seen. Has not had recent labs.     CT CHEST ABDOMEN PELVIS W CONTRAST  Narrative: EXAMINATION:  CT OF THE CHEST, ABDOMEN, AND PELVIS WITH CONTRAST 7/17/2020 3:40 pm     TECHNIQUE:  CT of the chest, abdomen and pelvis was performed with the administration of  intravenous contrast. Multiplanar reformatted images are provided for review.   Dose modulation, iterative reconstruction, and/or weight based adjustment of  the mA/kV was utilized to reduce the radiation dose to as low as reasonably  achievable.     COMPARISON:  CT abdomen and pelvis 05/06/2019, CT chest 04/09/2019     HISTORY:  ORDERING SYSTEM PROVIDED HISTORY: Carcinoma of ascending colon [facility])  TECHNOLOGIST PROVIDED HISTORY:  Additional Contrast?->Oral  Reason for exam:->staging  Reason for Exam: Carcinoma of ascending colon  Acuity: Chronic  Type of Exam: Subsequent/Follow-up     FINDINGS:     Chest:     Mediastinum: No mediastinal adenopathy. Thoracic aorta normal in caliber. Aberrant right subclavian artery noted. No pericardial effusion     Lungs/pleura: No suspicious pulmonary nodule. Atelectasis in the lung bases. No pleural effusion or pleural tumor     Soft Tissues/Bones: No axillary adenopathy. Stable sclerotic lesion of the  head of the right 8th rib. No new suspicious bone lesion     Abdomen/Pelvis:     Organs: Small nonobstructing bilateral renal calculi. 0.5 cm right renal  cyst.  Remaining solid organs and gallbladder unremarkable. Specifically, no  hepatic or adrenal masses     GI/Bowel: Status post right hemicolectomy. Diverticula of the sigmoid. No  other intrinsic gastrointestinal abnormality. No enlarged mesenteric lymph  nodes     Pelvis: Urinary bladder unremarkable. No pelvic adenopathy. Small bilateral  fat containing inguinal hernias     Peritoneum/Retroperitoneum: Interval removal of pigtail drainage catheter  from the right flank region. No recurrent fluid collection. No ascites. No  retroperitoneal adenopathy. Aorta unremarkable     Bones/Soft Tissues: No suspicious bone lesion. No groin adenopathy  Impression: 1. No evidence of intrathoracic or abdominopelvic metastatic disease  2. Stable sclerotic lesion of the head of the right 8th rib, likely a bone  island.   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  No heartburn, dysphagia, weight loss.  Father had stage 4 cancer of unknown primary. Received call from his pcp last night to go to ER for critical Hg of 6.3.  Not dizzy, sob, etc.  No abdominal pain at this time.     Pertinent PMH, FH, SH is reviewed below. Last EGD/colonoscopy:4/4/2019 -normal esophagus, stomach, and duodenum  -blood, Minimal in amount, located in the fundus  -esophagitis, grade 1   -diverticulosis, mild in degree, involving the sigmoid  -polyp(s) - #1, pedunculated 10 mm in size, located in the transverse colon, removed by cold snare and retrieved for pathology  -apple core tumor/mass located in the presumed ascending colon.  Could not advance scope through it; biopsies were obtained  Last Colonoscopy: 6/12/2020 -diverticulosis, mild in degree, involving the sigmoid  -polyp(s) - #1, 5 mm in size, located in the descending colon, removed by cold snare and retrieved for pathology  -evidence of previous right hemicolectomy    Review of available records reveals:   Wt Readings from Last 50 Encounters:   01/07/21 200 lb 3.2 oz (90.8 kg)   06/12/20 180 lb (81.6 kg)   10/22/19 177 lb (80.3 kg)   07/19/19 168 lb (76.2 kg)   07/19/19 167 lb (75.8 kg)   07/05/19 169 lb (76.7 kg)   06/10/19 163 lb (73.9 kg)   05/23/19 157 lb (71.2 kg)   05/20/19 159 lb (72.1 kg)   05/06/19 158 lb 12.8 oz (72 kg)   04/26/19 168 lb (76.2 kg)   04/21/19 160 lb 12.8 oz (72.9 kg)   04/11/19 171 lb (77.6 kg)   04/11/19 167 lb 3.2 oz (75.8 kg)   04/08/19 171 lb 6.4 oz (77.7 kg)   04/04/19 171 lb (77.6 kg)   04/02/19 171 lb (77.6 kg)   04/01/19 172 lb (78 kg)       No components found for: HGBA1C  BP Readings from Last 3 Encounters:   01/07/21 129/86   06/12/20 (!) 126/92   10/22/19 136/88     Health Maintenance   Topic Date Due    Hepatitis C screen  1965    HIV screen  11/24/1980    DTaP/Tdap/Td vaccine (1 - Tdap) 11/24/1984    Diabetes screen  11/24/2005    Shingles Vaccine (1 of 2) 11/24/2015    Potassium monitoring  07/05/2020    Flu vaccine (1) 09/01/2020    Creatinine monitoring  07/17/2021    Colon cancer screen colonoscopy  06/12/2023    Lipid screen  04/01/2024    Hepatitis A vaccine  Aged Out    Hepatitis B vaccine  Aged Out    Hib vaccine  Aged Out    Meningococcal (ACWY) vaccine  Aged Out    Pneumococcal 0-64 years Vaccine  Aged Out       No components found for: [facility]     PAST MEDICAL HISTORY     Past Medical History:   Diagnosis Date    GERD (gastroesophageal reflux disease)     resolved    Kidney stones     Malignant neoplasm of ascending colon ([address] 4/9/2019    Uncontrolled hypertension 7/19/2019     FAMILY HISTORY     Family History   Problem Relation Age of Onset    Other Mother         diverticulitis    Diabetes Father     Cancer Father         bladder  w mets     SOCIAL HISTORY     Social History     Socioeconomic History    Marital status:      Spouse name: Not on file    Number of children: Not on file    Years of education: Not on file    Highest education level: Not on file   Occupational History    Not on file   Social Needs    Financial resource strain: Not on file    Food insecurity     Worry: Not on file     Inability: Not on file    Transportation needs     Medical: Not on file     Non-medical: Not on file   Tobacco Use    Smoking status: Never Smoker    Smokeless tobacco: Never Used   Substance and Sexual Activity    Alcohol use: Not Currently     Frequency: Never     Comment: rarely    Drug use: Never    Sexual activity: Not on file   Lifestyle    Physical activity     Days per week: Not on file     Minutes per session: Not on file    Stress: Not on file   Relationships    Social connections     Talks on phone: Not on file     Gets together: Not on file Attends Druze service: Not on file     Active member of club or organization: Not on file     Attends meetings of clubs or organizations: Not on file     Relationship status: Not on file    Intimate partner violence     Fear of current or ex partner: Not on file     Emotionally abused: Not on file     Physically abused: Not on file     Forced sexual activity: Not on file   Other Topics Concern    Not on file   Social History Narrative    Not on file     SURGICAL HISTORY     Past Surgical History:   Procedure Laterality Date    APPENDECTOMY      COLON SURGERY      I & D of colon abscess [post op    COLONOSCOPY N/A 4/4/2019    COLONOSCOPY WITH BIOPSY performed by Belkis Monet MD at [facility] [address]  4/4/2019    COLONOSCOPY POLYPECTOMY SNARE/COLD BIOPSY performed by Belkis Monet MD at [place] [address] 6/12/2020    COLONOSCOPY POLYPECTOMY [address] performed by Shona Staton MD at [address] Right 4/16/2019    ROBOTIC LAPAROSCOPIC RIGHT COLECTOMY CPT CODE - 01282 performed by Buck Dowd MD at [address] 4/4/2019    EGD performed by Belkis Monet MD at [facility] [address]   (This list may include medications prescribed during this encounter as epic can not insert only the list prior to this encounter.)  Current Outpatient Rx   Medication Sig Dispense Refill    rifaximin (XIFAXAN) 550 MG tablet Take 1 tablet by mouth 3 times daily for 14 days 42 tablet 2    amitriptyline (ELAVIL) 50 MG tablet Take 1 tablet by mouth nightly 30 tablet 11    lisinopril-hydroCHLOROthiazide (PRINZIDE;ZESTORETIC) 20-25 MG per tablet Take 1 tablet by mouth daily 90 tablet 1    amLODIPine (NORVASC) 5 MG tablet Take 1 tablet by mouth daily 90 tablet 3    pantoprazole (PROTONIX) 20 MG tablet Take 1 tablet by mouth daily 90 tablet 3    fluticasone (FLONASE ALLERGY RELIEF) 50 MCG/ACT nasal spray 2 sprays by Nasal route daily 1 Bottle 2    pantoprazole (PROTONIX) 20 MG tablet Take 1 tablet by mouth 2 times daily (before meals) 180 tablet 0     ALLERGIES     Allergies   Allergen Reactions    Prednisone Nausea And Vomiting     IMMUNIZATIONS     There is no immunization history on file for this patient. REVIEW OF SYSTEMS (2-9 systems for level 4, 10 or more for level 5)   See HPI for further details and pertinent postiives. Negative for the following:  Constitutional: Negative for weight change. Negative for appetite change and fatigue. HENT: Negative for nosebleeds, sore throat, mouth sores, and voice change. Respiratory: Negative for cough, choking and chest tightness. Cardiovascular: Negative for chest pain   Gastrointestinal:  No heartburn, dysphagia, cough, chest pain, globus, regurgitation, constipation, nausea, or vomiting. Positive for bloating, diarrhea, luq pain. Musculoskeletal: Negative for arthralgias. Skin: Negative for pallor. Neurological: Negative for weakness and light-headedness. Hematological: Negative for adenopathy. Does not bruise/bleed easily. Psychiatric/Behavioral: Negative for suicidal ideas. PHYSICAL EXAM (7 for level 4, 8 or more for level 5)   VITAL SIGNS: /86 (Site: Left Wrist, Position: Sitting, Cuff Size: Medium Adult)   Pulse 94   Temp 97.7 °F (36.5 °C) (Temporal)   Ht 5' 9\" (1.753 m)   Wt 200 lb 3.2 oz (90.8 kg)   BMI 29.56 kg/m²   Wt Readings from Last 3 Encounters:   01/07/21 200 lb 3.2 oz (90.8 kg)   06/12/20 180 lb (81.6 kg)   10/22/19 177 lb (80.3 kg)     Constitutional: Well developed, Well nourished, No acute distress, Non-toxic appearance. HENT: Normocephalic, Atraumatic, Bilateral external ears normal, Oropharynx moist, No oral exudates, Nose normal.   Eyes: Conjunctiva normal, No discharge. Neck: Normal range of motion, No tenderness, Supple, No stridor.    Lymphatic: No cervical, subclavian, or axillary lymphadenopathy. Cardiovascular: Normal heart rate, Normal rhythm, No murmurs, No rubs, No gallops. Thorax & Lungs: Normal breath sounds, No respiratory distress, No wheezing, No chest tenderness. No gynecomastia. Abdomen/GI: scars consistent with stated surgeries, no hernias, no HSM, soft NTND   Rectal:  Deferred. Skin: Warm, Dry, No erythema, No rash. No bruising. No spider hemangiomas. Back: No tenderness, No CVA tenderness. Lower Extremities: Intact distal pulses, No edema, No tenderness, No cyanosis, No clubbing. Neurologic: Alert & oriented x 3, Normal motor function, Normal sensory function, No focal deficits noted. No asterixis. RADIOLOGY/PROCEDURES         FINAL IMPRESSION   No orders of the defined types were placed in this encounter. Sam Silva was seen today for follow-up. Diagnoses and all orders for this visit:    Irritable bowel syndrome with diarrhea  -     rifaximin (XIFAXAN) 550 MG tablet; Take 1 tablet by mouth 3 times daily for 14 days  -     amitriptyline (ELAVIL) 50 MG tablet; Take 1 tablet by mouth nightly    Insomnia, unspecified type  -     amitriptyline (ELAVIL) 50 MG tablet; Take 1 tablet by mouth nightly    Also discussed trials of FD-guard if xifaxan does not help. Discussed potential sbft with continued symptoms. ORDERED FUTURE/PENDING TESTS     Lab Frequency Next Occurrence   COVID-19 Ambulatory Once 05/15/2020       FOLLOWUP   Return if symptoms worsen or fail to improve.           Tomy Iverson 1/7/21 3:44 PM EST    CC:  PARVEZ George - CNP

## 2021-02-08 DIAGNOSIS — R10.12 LUQ PAIN: ICD-10-CM

## 2021-02-08 RX ORDER — PANTOPRAZOLE SODIUM 20 MG/1
20 TABLET, DELAYED RELEASE ORAL
Qty: 180 TABLET | Refills: 1 | Status: SHIPPED | OUTPATIENT
Start: 2021-02-08 | End: 2021-08-27 | Stop reason: SDUPTHER

## 2021-02-08 NOTE — TELEPHONE ENCOUNTER
Future Appointments   Date Time Provider Leonila Price   2/15/2021  3:30 PM [facility] Rufus Rad     LOV 8/18/2020

## 2021-05-10 NOTE — TELEPHONE ENCOUNTER
Future Appointments   Date Time Provider Leonila Price   5/14/2021  5:00 PM [facility] CT [facility] CT Rufus Rad     LOV 8/18/2020

## 2021-05-11 RX ORDER — FLUTICASONE PROPIONATE 50 MCG
2 SPRAY, SUSPENSION (ML) NASAL DAILY
Qty: 1 BOTTLE | Refills: 2 | Status: SHIPPED | OUTPATIENT
Start: 2021-05-11 | End: 2022-03-21

## 2021-05-14 ENCOUNTER — HOSPITAL ENCOUNTER (OUTPATIENT)
Dept: CT IMAGING | Age: 56
Discharge: HOME | End: 2021-05-14
Payer: COMMERCIAL

## 2021-05-14 DIAGNOSIS — C18.2 MALIGNANT NEOPLASM OF ASCENDING COLON (HCC): ICD-10-CM

## 2021-05-14 PROCEDURE — 74177 CT ABD & PELVIS W/CONTRAST: CPT

## 2021-05-14 PROCEDURE — 6360000004 HC RX CONTRAST MEDICATION: Performed by: INTERNAL MEDICINE

## 2021-05-14 RX ADMIN — IOPAMIDOL 100 ML: 755 INJECTION, SOLUTION INTRAVENOUS at 17:55

## 2021-05-14 RX ADMIN — IOHEXOL 50 ML: 240 INJECTION, SOLUTION INTRATHECAL; INTRAVASCULAR; INTRAVENOUS; ORAL at 17:55

## 2021-06-17 DIAGNOSIS — G47.00 INSOMNIA, UNSPECIFIED TYPE: ICD-10-CM

## 2021-06-17 DIAGNOSIS — K58.0 IRRITABLE BOWEL SYNDROME WITH DIARRHEA: ICD-10-CM

## 2021-06-18 RX ORDER — AMLODIPINE BESYLATE 5 MG/1
5 TABLET ORAL DAILY
Qty: 30 TABLET | Refills: 0 | Status: SHIPPED | OUTPATIENT
Start: 2021-06-18 | End: 2021-06-21

## 2021-06-21 RX ORDER — AMLODIPINE BESYLATE 5 MG/1
5 TABLET ORAL DAILY
Qty: 90 TABLET | Refills: 0 | Status: SHIPPED | OUTPATIENT
Start: 2021-06-21 | End: 2021-11-22

## 2021-08-13 DIAGNOSIS — I10 ESSENTIAL HYPERTENSION: Primary | ICD-10-CM

## 2021-08-13 RX ORDER — LISINOPRIL AND HYDROCHLOROTHIAZIDE 25; 20 MG/1; MG/1
1 TABLET ORAL DAILY
Qty: 30 TABLET | Refills: 0 | Status: SHIPPED | OUTPATIENT
Start: 2021-08-13 | End: 2021-08-19 | Stop reason: SDUPTHER

## 2021-08-13 NOTE — TELEPHONE ENCOUNTER
----- Message from Jai Casper sent at 8/13/2021  7:20 AM EDT -----  Subject: Refill Request    QUESTIONS  Name of Medication? lisinopril-hydroCHLOROthiazide (PRINZIDE;ZESTORETIC)   20-25 MG per tablet  Patient-reported dosage and instructions? TAKE 1 TABLET BY MOUTH DAILY  How many days do you have left? 2  Preferred Pharmacy? [facility]/PH[facility]Y #[id number]  Pharmacy phone number (if available)? [phone]  ---------------------------------------------------------------------------  --------------  Delon SABA  What is the best way for the office to contact you? OK to leave message on   voicemail  Preferred Call Back Phone Number?  [phone]

## 2021-08-13 NOTE — TELEPHONE ENCOUNTER
Patient has not been seen in the office since 2020. He needs to make an appointment for hypertension. He also needs lab work before the visit if possible.

## 2021-08-19 ENCOUNTER — TELEPHONE (OUTPATIENT)
Dept: FAMILY MEDICINE CLINIC | Age: 56
End: 2021-08-19

## 2021-08-19 RX ORDER — LISINOPRIL AND HYDROCHLOROTHIAZIDE 25; 20 MG/1; MG/1
1 TABLET ORAL DAILY
Qty: 90 TABLET | Refills: 0 | Status: SHIPPED | OUTPATIENT
Start: 2021-08-19 | End: 2021-11-10

## 2021-08-19 NOTE — TELEPHONE ENCOUNTER
Please tell pt I filled his 90 day supply but will not do it again unless  He is seen in the office.  His last visit was a year ago

## 2021-08-19 NOTE — TELEPHONE ENCOUNTER
Pt called stating insurance will only cover a 90 day supply of medication    lisinopril-hydroCHLOROthiazide (PRINZIDE;ZESTORETIC) 20-25 MG per tablet     [address] [place] [address], [address] [place] [address], S..          Last appt. 8/18/2020    No future appointments.

## 2021-08-27 ENCOUNTER — OFFICE VISIT (OUTPATIENT)
Dept: FAMILY MEDICINE CLINIC | Age: 56
End: 2021-08-27
Payer: COMMERCIAL

## 2021-08-27 VITALS
TEMPERATURE: 97.1 F | OXYGEN SATURATION: 98 % | RESPIRATION RATE: 16 BRPM | BODY MASS INDEX: 29.71 KG/M2 | HEART RATE: 93 BPM | SYSTOLIC BLOOD PRESSURE: 110 MMHG | HEIGHT: 69 IN | WEIGHT: 200.6 LBS | DIASTOLIC BLOOD PRESSURE: 80 MMHG

## 2021-08-27 DIAGNOSIS — R19.7 DIARRHEA, UNSPECIFIED TYPE: ICD-10-CM

## 2021-08-27 DIAGNOSIS — I10 ESSENTIAL HYPERTENSION: Primary | ICD-10-CM

## 2021-08-27 DIAGNOSIS — K21.00 GASTROESOPHAGEAL REFLUX DISEASE WITH ESOPHAGITIS WITHOUT HEMORRHAGE: ICD-10-CM

## 2021-08-27 DIAGNOSIS — C18.2 MALIGNANT NEOPLASM OF ASCENDING COLON (HCC): ICD-10-CM

## 2021-08-27 DIAGNOSIS — E78.5 ELEVATED LIPIDS: ICD-10-CM

## 2021-08-27 DIAGNOSIS — E02 SUBCLINICAL IODINE-DEFICIENCY HYPOTHYROIDISM: ICD-10-CM

## 2021-08-27 LAB
A/G RATIO: 1.7 (ref 1.1–2.2)
ALBUMIN SERPL-MCNC: 4.7 G/DL (ref 3.4–5)
ALP BLD-CCNC: 160 U/L (ref 40–129)
ALT SERPL-CCNC: 52 U/L (ref 10–40)
ANION GAP SERPL CALCULATED.3IONS-SCNC: 11 MMOL/L (ref 3–16)
AST SERPL-CCNC: 28 U/L (ref 15–37)
BASOPHILS ABSOLUTE: 0 K/UL (ref 0–0.2)
BASOPHILS RELATIVE PERCENT: 0.5 %
BILIRUB SERPL-MCNC: 0.3 MG/DL (ref 0–1)
BUN BLDV-MCNC: 15 MG/DL (ref 7–20)
CALCIUM SERPL-MCNC: 10 MG/DL (ref 8.3–10.6)
CHLORIDE BLD-SCNC: 96 MMOL/L (ref 99–110)
CHOLESTEROL, FASTING: 224 MG/DL (ref 0–199)
CO2: 26 MMOL/L (ref 21–32)
CREAT SERPL-MCNC: 0.8 MG/DL (ref 0.9–1.3)
EOSINOPHILS ABSOLUTE: 0.1 K/UL (ref 0–0.6)
EOSINOPHILS RELATIVE PERCENT: 1.6 %
GFR AFRICAN AMERICAN: >60
GFR NON-AFRICAN AMERICAN: >60
GLOBULIN: 2.7 G/DL
GLUCOSE BLD-MCNC: 106 MG/DL (ref 70–99)
HCT VFR BLD CALC: 42.9 % (ref 40.5–52.5)
HDLC SERPL-MCNC: 36 MG/DL (ref 40–60)
HEMOGLOBIN: 15.3 G/DL (ref 13.5–17.5)
LDL CHOLESTEROL CALCULATED: 137 MG/DL
LYMPHOCYTES ABSOLUTE: 1.7 K/UL (ref 1–5.1)
LYMPHOCYTES RELATIVE PERCENT: 19.8 %
MCH RBC QN AUTO: 30.4 PG (ref 26–34)
MCHC RBC AUTO-ENTMCNC: 35.6 G/DL (ref 31–36)
MCV RBC AUTO: 85.5 FL (ref 80–100)
MONOCYTES ABSOLUTE: 0.8 K/UL (ref 0–1.3)
MONOCYTES RELATIVE PERCENT: 9.4 %
NEUTROPHILS ABSOLUTE: 6.1 K/UL (ref 1.7–7.7)
NEUTROPHILS RELATIVE PERCENT: 68.7 %
PDW BLD-RTO: 13.3 % (ref 12.4–15.4)
PLATELET # BLD: 257 K/UL (ref 135–450)
PMV BLD AUTO: 8.4 FL (ref 5–10.5)
POTASSIUM SERPL-SCNC: 4.1 MMOL/L (ref 3.5–5.1)
RBC # BLD: 5.01 M/UL (ref 4.2–5.9)
SODIUM BLD-SCNC: 133 MMOL/L (ref 136–145)
TOTAL PROTEIN: 7.4 G/DL (ref 6.4–8.2)
TRIGLYCERIDE, FASTING: 254 MG/DL (ref 0–150)
TSH REFLEX: 5.19 UIU/ML (ref 0.27–4.2)
VITAMIN D 25-HYDROXY: 14.9 NG/ML
VLDLC SERPL CALC-MCNC: 51 MG/DL
WBC # BLD: 8.8 K/UL (ref 4–11)

## 2021-08-27 PROCEDURE — 36415 COLL VENOUS BLD VENIPUNCTURE: CPT | Performed by: NURSE PRACTITIONER

## 2021-08-27 PROCEDURE — 99213 OFFICE O/P EST LOW 20 MIN: CPT | Performed by: NURSE PRACTITIONER

## 2021-08-27 RX ORDER — AMITRIPTYLINE HYDROCHLORIDE 50 MG/1
50 TABLET, FILM COATED ORAL NIGHTLY
Qty: 30 TABLET | Refills: 11 | Status: SHIPPED | OUTPATIENT
Start: 2021-08-27 | End: 2022-10-28

## 2021-08-27 RX ORDER — PANTOPRAZOLE SODIUM 20 MG/1
20 TABLET, DELAYED RELEASE ORAL
Qty: 180 TABLET | Refills: 1 | Status: SHIPPED | OUTPATIENT
Start: 2021-08-27 | End: 2022-06-09 | Stop reason: SDUPTHER

## 2021-08-27 SDOH — ECONOMIC STABILITY: FOOD INSECURITY: WITHIN THE PAST 12 MONTHS, THE FOOD YOU BOUGHT JUST DIDN'T LAST AND YOU DIDN'T HAVE MONEY TO GET MORE.: NEVER TRUE

## 2021-08-27 SDOH — ECONOMIC STABILITY: FOOD INSECURITY: WITHIN THE PAST 12 MONTHS, YOU WORRIED THAT YOUR FOOD WOULD RUN OUT BEFORE YOU GOT MONEY TO BUY MORE.: NEVER TRUE

## 2021-08-27 ASSESSMENT — PATIENT HEALTH QUESTIONNAIRE - PHQ9
1. LITTLE INTEREST OR PLEASURE IN DOING THINGS: 0
SUM OF ALL RESPONSES TO PHQ QUESTIONS 1-9: 0
SUM OF ALL RESPONSES TO PHQ QUESTIONS 1-9: 0
2. FEELING DOWN, DEPRESSED OR HOPELESS: 0
SUM OF ALL RESPONSES TO PHQ QUESTIONS 1-9: 0
SUM OF ALL RESPONSES TO PHQ9 QUESTIONS 1 & 2: 0

## 2021-08-27 ASSESSMENT — SOCIAL DETERMINANTS OF HEALTH (SDOH): HOW HARD IS IT FOR YOU TO PAY FOR THE VERY BASICS LIKE FOOD, HOUSING, MEDICAL CARE, AND HEATING?: NOT HARD AT ALL

## 2021-08-28 LAB — T4 FREE: 1.2 NG/DL (ref 0.9–1.8)

## 2021-08-29 DIAGNOSIS — E78.2 MIXED HYPERLIPIDEMIA: Primary | ICD-10-CM

## 2021-08-29 DIAGNOSIS — N40.0 BENIGN PROSTATIC HYPERPLASIA WITHOUT LOWER URINARY TRACT SYMPTOMS: ICD-10-CM

## 2021-08-29 RX ORDER — ERGOCALCIFEROL 1.25 MG/1
50000 CAPSULE ORAL WEEKLY
Qty: 12 CAPSULE | Refills: 1 | Status: SHIPPED | OUTPATIENT
Start: 2021-08-29 | End: 2022-09-16 | Stop reason: SDUPTHER

## 2021-08-29 ASSESSMENT — ENCOUNTER SYMPTOMS
WHEEZING: 0
SHORTNESS OF BREATH: 0
CHEST TIGHTNESS: 0
RESPIRATORY NEGATIVE: 1

## 2021-08-29 NOTE — PROGRESS NOTES
Subjective:      Chief Complaint   Patient presents with    Hypertension    Discuss Labs     would like to cholesterol       Patient ID: Joy Chaudhry is a 54 y.o. male who presents for discussion of his medications, overall wellness and lab work. Last ov for me was in 2019. He has been to see various specialities in between times. He was initially introduced into this office and [facility] for colon cancer,of which he is now in remission. He denied any problems there and states he follow w oncology routinely. States he is doing fine, understands all of his medications and how to take them. Last set of labs were in 2019 fairly normal but I will run another set today at the request of the pt. HPI see above    Family History   Problem Relation Age of Onset    Other Mother         diverticulitis    Diabetes Father     Cancer Father         bladder  w mets       Social History     Socioeconomic History    Marital status:      Spouse name: Not on file    Number of children: Not on file    Years of education: Not on file    Highest education level: Not on file   Occupational History    Not on file   Tobacco Use    Smoking status: Never Smoker    Smokeless tobacco: Never Used   Vaping Use    Vaping Use: Never assessed   Substance and Sexual Activity    Alcohol use: Not Currently     Comment: rarely    Drug use: Never    Sexual activity: Not on file   Other Topics Concern    Not on file   Social History Narrative    Not on file     Social Determinants of Health     Financial Resource Strain: Low Risk     Difficulty of Paying Living Expenses: Not hard at all   Food Insecurity: No Food Insecurity    Worried About [address] in the Last Year: Never true    [address]  in the Last Year: Never true   Transportation Needs:     Lack of Transportation (Medical):      Lack of Transportation (Non-Medical):    Physical Activity:     Days of Exercise per Week:     Minutes of Exercise per Session:    Stress:     Feeling of Stress :    Social Connections:     Frequency of Communication with Friends and Family:     Frequency of Social Gatherings with Friends and Family:     Attends Restoration Services:     Active Member of Clubs or Organizations:     Attends Club or Organization Meetings:     Marital Status:    Intimate Partner Violence:     Fear of Current or Ex-Partner:     Emotionally Abused:     Physically Abused:     Sexually Abused:        Current Outpatient Medications on File Prior to Visit   Medication Sig Dispense Refill    rifaximin (XIFAXAN) 550 MG tablet Take 550 mg by mouth 3 times daily      lisinopril-hydroCHLOROthiazide (PRINZIDE;ZESTORETIC) 20-25 MG per tablet Take 1 tablet by mouth daily 90 tablet 0    amLODIPine (NORVASC) 5 MG tablet Take 1 tablet by mouth daily 90 tablet 0    fluticasone (FLONASE ALLERGY RELIEF) 50 MCG/ACT nasal spray 2 sprays by Nasal route daily 1 Bottle 2     No current facility-administered medications on file prior to visit. Review of Systems   Respiratory: Negative. Negative for chest tightness, shortness of breath and wheezing. Cardiovascular: Negative. Negative for chest pain, palpitations and leg swelling. Htn controlled w zestoretic   Gastrointestinal:        GERD_ppi    Hx malignant neoplasm of colon 2019 surgically corrected   Genitourinary:        Hx kidney stone   Psychiatric/Behavioral: Negative. Objective:     Physical Exam  Vitals reviewed. Constitutional:       Appearance: He is normal weight. HENT:      Head: Normocephalic and atraumatic. Right Ear: Ear canal normal.      Left Ear: Ear canal normal.      Nose: Nose normal.      Mouth/Throat:      Mouth: Mucous membranes are moist.      Pharynx: Oropharynx is clear. Eyes:      General: No scleral icterus. Conjunctiva/sclera: Conjunctivae normal.   Neck:      Vascular: No carotid bruit.    Cardiovascular:      Rate and Rhythm: Normal rate and

## 2021-08-30 DIAGNOSIS — E55.9 VITAMIN D DEFICIENCY: Primary | ICD-10-CM

## 2021-11-10 RX ORDER — LISINOPRIL AND HYDROCHLOROTHIAZIDE 25; 20 MG/1; MG/1
TABLET ORAL
Qty: 90 TABLET | Refills: 0 | Status: SHIPPED | OUTPATIENT
Start: 2021-11-10 | End: 2022-02-03

## 2021-11-22 RX ORDER — AMLODIPINE BESYLATE 5 MG/1
TABLET ORAL
Qty: 90 TABLET | Refills: 0 | Status: SHIPPED | OUTPATIENT
Start: 2021-11-22 | End: 2022-02-28

## 2021-11-22 NOTE — TELEPHONE ENCOUNTER
Future Appointments   Date Time Provider Leonila Price   11/30/2021  9:40  [facility]     8/27/2021

## 2021-12-08 DIAGNOSIS — E78.2 MIXED HYPERLIPIDEMIA: ICD-10-CM

## 2021-12-08 DIAGNOSIS — E55.9 VITAMIN D DEFICIENCY: ICD-10-CM

## 2021-12-08 DIAGNOSIS — I10 ESSENTIAL HYPERTENSION: ICD-10-CM

## 2021-12-08 DIAGNOSIS — N40.0 BENIGN PROSTATIC HYPERPLASIA WITHOUT LOWER URINARY TRACT SYMPTOMS: ICD-10-CM

## 2021-12-08 LAB
CHOLESTEROL, FASTING: 199 MG/DL (ref 0–199)
GLUCOSE FASTING: 90 MG/DL (ref 70–99)
HDLC SERPL-MCNC: 36 MG/DL (ref 40–60)
LDL CHOLESTEROL CALCULATED: 120 MG/DL
PROSTATE SPECIFIC ANTIGEN: 0.96 NG/ML (ref 0–4)
TRIGLYCERIDE, FASTING: 217 MG/DL (ref 0–150)
VITAMIN D 25-HYDROXY: 21.7 NG/ML
VLDLC SERPL CALC-MCNC: 43 MG/DL

## 2021-12-16 DIAGNOSIS — R79.89 LOW SERUM VITAMIN D: Primary | ICD-10-CM

## 2022-01-18 ENCOUNTER — VIRTUAL VISIT (OUTPATIENT)
Dept: FAMILY MEDICINE CLINIC | Age: 57
End: 2022-01-18
Payer: COMMERCIAL

## 2022-01-18 DIAGNOSIS — I10 HYPERTENSION, UNSPECIFIED TYPE: ICD-10-CM

## 2022-01-18 DIAGNOSIS — E87.1 HYPONATREMIA: ICD-10-CM

## 2022-01-18 DIAGNOSIS — R79.89 LOW SERUM VITAMIN D: ICD-10-CM

## 2022-01-18 DIAGNOSIS — N20.0 KIDNEY STONES: Primary | ICD-10-CM

## 2022-01-18 PROCEDURE — 99213 OFFICE O/P EST LOW 20 MIN: CPT | Performed by: NURSE PRACTITIONER

## 2022-01-18 RX ORDER — ERGOCALCIFEROL 1.25 MG/1
50000 CAPSULE ORAL WEEKLY
Qty: 12 CAPSULE | Refills: 0 | Status: SHIPPED | OUTPATIENT
Start: 2022-01-18 | End: 2022-04-25 | Stop reason: CLARIF

## 2022-01-18 ASSESSMENT — ENCOUNTER SYMPTOMS
SHORTNESS OF BREATH: 0
COUGH: 0
RESPIRATORY NEGATIVE: 1

## 2022-01-18 NOTE — PROGRESS NOTES
Subjective:      Chief Complaint   Patient presents with    Hypertension     been moderating b/p has been up and kidney stone pain       Patient ID: Karon Scott is a 64 y.o. male who presents for several concerns. First he is worried about his blood pressure being up. States he was having some dull right kidney pain and checked his blood pressure was 135/90. Another time he woke up very anxious about his job and it was similar. I asked him to check his pressure when he is not worried about something when he is sitting at home calmly. Has a history of kidney stones as seen on his CT scan from April of this year. The scans are nonobstructing so it was felt that they would not bother with them. However now the the stones are beginning to be uncomfortable and affecting his day. He can feel it when he is walking the pain goes into his buttocks and down his thigh. I will refer him to urology. His renal function is normal BUN is normal and so his GFR. Hyponatremia133. He is concerned about this stating he knows it is a water pill and his blood pressure medicine and he drinks a lot but voids a lot too. He was told by a nurse that he should stop taking the blood pressure pill. However we cannot do that at this point unless we substitute it with something else because his blood pressure when he is stressed is a bit high. I would like to keep the blood pressure medicine as is and repeat his electrolytes to see what his numbers are doing currently. His vitamin D level is a little low at 21.7 we will call in a supplement for that and have a recheck in 3 months. PMH: Colon cancer resolved, GERD, hypertension, kidney stones.   Has not received COVID-vaccine    HPI see above    Family History   Problem Relation Age of Onset    Other Mother         diverticulitis    Diabetes Father     Cancer Father         bladder  w mets       Social History     Socioeconomic History    Marital status:  Spouse name: Not on file    Number of children: Not on file    Years of education: Not on file    Highest education level: Not on file   Occupational History    Not on file   Tobacco Use    Smoking status: Never Smoker    Smokeless tobacco: Never Used   Vaping Use    Vaping Use: Not on file   Substance and Sexual Activity    Alcohol use: Not Currently     Comment: rarely    Drug use: Never    Sexual activity: Not on file   Other Topics Concern    Not on file   Social History Narrative    Not on file     Social Determinants of Health     Financial Resource Strain: Low Risk     Difficulty of Paying Living Expenses: Not hard at all   Food Insecurity: No Food Insecurity    Worried About [address] in the Last Year: Never true    [address]  [employer] in the Last Year: Never true   Transportation Needs:     Lack of Transportation (Medical): Not on file    Lack of Transportation (Non-Medical):  Not on file   Physical Activity:     Days of Exercise per Week: Not on file    Minutes of Exercise per Session: Not on file   Stress:     Feeling of Stress : Not on file   Social Connections:     Frequency of Communication with Friends and Family: Not on file    Frequency of Social Gatherings with Friends and Family: Not on file    Attends Adventism Services: Not on file    Active Member of [address] [place] [address] [employer] or Organizations: Not on file    Attends Club or Organization Meetings: Not on file    Marital Status: Not on file   Intimate Partner Violence:     Fear of Current or Ex-Partner: Not on file    Emotionally Abused: Not on file    Physically Abused: Not on file    Sexually Abused: Not on file   Housing Stability:     Unable to Pay for Housing in the Last Year: Not on file    Number of [place] in the Last Year: Not on file    Unstable Housing in the Last Year: Not on file       Current Outpatient Medications on File Prior to Visit   Medication Sig Dispense Refill    amLODIPine (NORVASC) 5 MG tablet TAKE 1 TABLET BY MOUTH EVERY DAY 90 tablet 0    lisinopril-hydroCHLOROthiazide (PRINZIDE;ZESTORETIC) 20-25 MG per tablet TAKE 1 TABLET BY MOUTH EVERY DAY 90 tablet 0    amitriptyline (ELAVIL) 50 MG tablet Take 1 tablet by mouth nightly 30 tablet 11    pantoprazole (PROTONIX) 20 MG tablet Take 1 tablet by mouth 2 times daily (before meals) 180 tablet 1    fluticasone (FLONASE ALLERGY RELIEF) 50 MCG/ACT nasal spray 2 sprays by Nasal route daily 1 Bottle 2    vitamin D (ERGOCALCIFEROL) 1.25 MG (09302 UT) CAPS capsule Take 1 capsule by mouth once a week (Patient not taking: Reported on 1/18/2022) 12 capsule 1     No current facility-administered medications on file prior to visit. Review of Systems   Respiratory: Negative. Negative for cough and shortness of breath. Cardiovascular: Negative for chest pain and palpitations. Hypertension lisinopril and Norvasc  Hyperlipidemia levels from this year were total 199 HDL 36  triglycerides 217 diet and exercise   Gastrointestinal:        GERDPPI   Genitourinary: Negative for dysuria and hematuria. History of kidney stones on the right. Nonobstructing   Psychiatric/Behavioral: Positive for sleep disturbance (Elavil). Objective:     Physical Exam  Neurological:      Mental Status: He is alert. Psychiatric:         Mood and Affect: Mood normal.         Behavior: Behavior normal.         Thought Content: Thought content normal.         Judgment: Judgment normal.         Assessment:     1. Kidney stones  Patient now having discomfort when he walks. - Bernadette Solorzano MD, Urology, [facility]    2. Low serum vitamin D  Replacement sent  - VITAMIN D 25 HYDROXY; Future    3. Hyponatremia  We will check electrolytes, continue same meds for BP  - COMPREHENSIVE METABOLIC PANEL; Future  - CBC WITH AUTO DIFFERENTIAL; Future  - TSH with Reflex; Future    4.  Hypertension, unspecified type  Continue same meds      Plan:     As above  Call for any problems or concerns

## 2022-01-18 NOTE — PATIENT INSTRUCTIONS
Hypertension135/90 with pain and worry.   Would like to get a blood pressure at rest.  Kidney stone currently not obstructing however it is causing him some pain and discomfort urology referral  Low D- vitamin D replacement  Hyponatremia 133-continue meds and check an electrolyte panel

## 2022-02-03 RX ORDER — LISINOPRIL AND HYDROCHLOROTHIAZIDE 25; 20 MG/1; MG/1
TABLET ORAL
Qty: 90 TABLET | Refills: 0 | Status: SHIPPED | OUTPATIENT
Start: 2022-02-03 | End: 2022-08-02 | Stop reason: SDUPTHER

## 2022-02-28 ENCOUNTER — TELEPHONE (OUTPATIENT)
Dept: FAMILY MEDICINE CLINIC | Age: 57
End: 2022-02-28

## 2022-02-28 DIAGNOSIS — E78.2 MIXED HYPERLIPIDEMIA: Primary | ICD-10-CM

## 2022-02-28 RX ORDER — AMLODIPINE BESYLATE 5 MG/1
TABLET ORAL
Qty: 90 TABLET | Refills: 0 | Status: SHIPPED | OUTPATIENT
Start: 2022-02-28 | End: 2022-03-21

## 2022-02-28 NOTE — TELEPHONE ENCOUNTER
Please call patient and ask him to make an appointment for his cholesterol.   I will place orders in for fasting check

## 2022-03-21 RX ORDER — FLUTICASONE PROPIONATE 50 MCG
SPRAY, SUSPENSION (ML) NASAL
Qty: 16 G | Refills: 2 | Status: SHIPPED | OUTPATIENT
Start: 2022-03-21

## 2022-03-21 RX ORDER — AMLODIPINE BESYLATE 5 MG/1
TABLET ORAL
Qty: 90 TABLET | Refills: 0 | Status: SHIPPED | OUTPATIENT
Start: 2022-03-21 | End: 2022-06-20

## 2022-04-22 ENCOUNTER — HOSPITAL ENCOUNTER (OUTPATIENT)
Dept: CT IMAGING | Age: 57
Discharge: HOME | End: 2022-04-22
Payer: COMMERCIAL

## 2022-04-22 DIAGNOSIS — C18.9 MALIGNANT NEOPLASM OF COLON, UNSPECIFIED PART OF COLON (HCC): ICD-10-CM

## 2022-04-22 LAB
GFR AFRICAN AMERICAN: >60
GFR NON-AFRICAN AMERICAN: >60
PERFORMED ON: NORMAL
POC CREATININE: 1 MG/DL (ref 0.9–1.3)
POC SAMPLE TYPE: NORMAL

## 2022-04-22 PROCEDURE — 6360000004 HC RX CONTRAST MEDICATION: Performed by: INTERNAL MEDICINE

## 2022-04-22 PROCEDURE — 82565 ASSAY OF CREATININE: CPT

## 2022-04-22 PROCEDURE — 74177 CT ABD & PELVIS W/CONTRAST: CPT

## 2022-04-22 RX ADMIN — IOPAMIDOL 75 ML: 755 INJECTION, SOLUTION INTRAVENOUS at 08:59

## 2022-04-22 RX ADMIN — IOHEXOL 50 ML: 240 INJECTION, SOLUTION INTRATHECAL; INTRAVASCULAR; INTRAVENOUS; ORAL at 08:58

## 2022-04-25 ENCOUNTER — INITIAL CONSULT (OUTPATIENT)
Dept: SURGERY | Age: 57
End: 2022-04-25
Payer: COMMERCIAL

## 2022-04-25 VITALS
WEIGHT: 202 LBS | HEIGHT: 69 IN | TEMPERATURE: 97.4 F | DIASTOLIC BLOOD PRESSURE: 54 MMHG | SYSTOLIC BLOOD PRESSURE: 106 MMHG | HEART RATE: 90 BPM | BODY MASS INDEX: 29.92 KG/M2

## 2022-04-25 DIAGNOSIS — K43.2 INCISIONAL HERNIA, WITHOUT OBSTRUCTION OR GANGRENE: Primary | ICD-10-CM

## 2022-04-25 PROCEDURE — 99243 OFF/OP CNSLTJ NEW/EST LOW 30: CPT | Performed by: SURGERY

## 2022-04-29 NOTE — PROGRESS NOTES
Department of General Surgery Consult    PATIENT NAME: Tommy Clifton   YOB: 1965    ADMISSION DATE: No admission date for patient encounter. TODAY'S DATE: 4/25/22    Reason for Consult:  Incisional hernia    Chief Complaint: bulge    Requesting Physician:  Karla Anderson    HISTORY OF PRESENT ILLNESS:              The patient is a 64 y.o. male who presents with bulge at incision from prior partial colectomy for colon cancer. Has noticed for a few months. No pain. Mild discomfort with activity. No nausea. No emesis. .    Past Medical History:        Diagnosis Date    Colon cancer ([facility] 04/2019    GERD (gastroesophageal reflux disease)     resolved    Kidney stones     Malignant neoplasm of ascending colon ([facility] [address] 4/9/2019    Uncontrolled hypertension 7/19/2019       Past Surgical History:        Procedure Laterality Date    APPENDECTOMY      COLON SURGERY      I & D of colon abscess [post op    COLONOSCOPY N/A 4/4/2019    COLONOSCOPY WITH BIOPSY performed by Kellee Merchant MD at [place] [address]  4/4/2019    COLONOSCOPY POLYPECTOMY SNARE/COLD BIOPSY performed by Kellee Merchant MD at [place] [address] N/A 6/12/2020    COLONOSCOPY POLYPECTOMY SNARE performed by Raúl Camarena MD at [address] Right 4/16/2019    ROBOTIC LAPAROSCOPIC RIGHT COLECTOMY CPT CODE - 23429 performed by Jacklyn Sosa MD at [facility] 4/4/2019    EGD performed by Kellee Merchant MD at [address] [place] [address]       Current Medications:   No current facility-administered medications for this visit. Prior to Admission medications    Medication Sig Start Date End Date Taking?  Authorizing Provider   amLODIPine (NORVASC) 5 MG tablet TAKE 1 TABLET BY MOUTH EVERY DAY 3/21/22  Yes PARVEZ Lord - CNP   fluticasone (FLONASE) 50 MCG/ACT nasal spray SHAKE LIQUID AND USE 2 SPRAYS IN EACH NOSTRIL DAILY 3/21/22  Yes PARVEZ Wilkins CNP   lisinopril-hydroCHLOROthiazide (PRINZIDE;ZESTORETIC) 20-25 MG per tablet TAKE 1 TABLET BY MOUTH EVERY DAY 2/3/22  Yes PARVEZ Wilkins CNP   vitamin D (ERGOCALCIFEROL) 1.25 MG (26942 UT) CAPS capsule Take 1 capsule by mouth once a week 8/29/21  Yes PARVEZ Wilkins CNP   amitriptyline (ELAVIL) 50 MG tablet Take 1 tablet by mouth nightly 8/27/21 4/25/22 Yes PARVEZ Foreman CNP   pantoprazole (PROTONIX) 20 MG tablet Take 1 tablet by mouth 2 times daily (before meals) 8/27/21 4/25/22 Yes PRAVEZ Wilkins CNP        Allergies:  Penicillins and Prednisone    Social History:   Social History     Socioeconomic History    Marital status:      Spouse name: Not on file    Number of children: Not on file    Years of education: Not on file    Highest education level: Not on file   Occupational History    Not on file   Tobacco Use    Smoking status: Never Smoker    Smokeless tobacco: Never Used   Vaping Use    Vaping Use: Not on file   Substance and Sexual Activity    Alcohol use: Not Currently     Comment: rarely    Drug use: Never    Sexual activity: Not on file   Other Topics Concern    Not on file   Social History Narrative    Not on file     Social Determinants of Health     Financial Resource Strain: Low Risk     Difficulty of Paying Living Expenses: Not hard at all   Food Insecurity: No Food Insecurity    Worried About [address] in the Last Year: Never true    [facility] in the Last Year: Never true   Transportation Needs:     Lack of Transportation (Medical): Not on file    Lack of Transportation (Non-Medical):  Not on file   Physical Activity:     Days of Exercise per Week: Not on file    Minutes of Exercise per Session: Not on file   Stress:     Feeling of Stress : Not on file   Social Connections:     Frequency of Communication with Friends and Family: Not on file    Frequency of Social Gatherings with Friends and Family: Not on file    Attends Anabaptism Services: Not on file    Active Member of Clubs or Organizations: Not on file    Attends Club or Organization Meetings: Not on file    Marital Status: Not on file   Intimate Partner Violence:     Fear of Current or Ex-Partner: Not on file    Emotionally Abused: Not on file    Physically Abused: Not on file    Sexually Abused: Not on file   Housing Stability:     Unable to Pay for Housing in the Last Year: Not on file    Number of [place] in the Last Year: Not on file    Unstable Housing in the Last Year: Not on file         Family History:        Problem Relation Age of Onset    Other Mother         diverticulitis    Diabetes Father     Cancer Father         bladder  w mets       REVIEW OF SYSTEMS:  CONSTITUTIONAL:  negative  HEENT:  negative  RESPIRATORY:  negative  CARDIOVASCULAR:  negative  GASTROINTESTINAL:  negative   GENITOURINARY:  negative  HEMATOLOGIC/LYMPHATIC:  negative  NEUROLOGICAL:  Negative  * All other ROS reviewed and negative.        PHYSICAL EXAM:  VITALS:  BP (!) 106/54   Pulse 90   Temp 97.4 °F (36.3 °C)   Ht 5' 9\" (1.753 m)   Wt 202 lb (91.6 kg)   BMI 29.83 kg/m²   24HR INTAKE/OUTPUT:    [unfilled]  [unfilled]      CONSTITUTIONAL:  alert, no apparent distress and normal weight  EYES:  PERRL, sclera clear  ENT:  Normocephalic,atraumatic, without obvious abnormality  NECK:  supple, symmetrical, trachea midline  LUNGS: Resp effort easy and unlabored, no crackles or wheezing  CARDIOVASCULAR:  NO JVD, regular rate   ABDOMEN:  , normal bowel sounds, soft, non-distended, non-tender, incisional hernia  MUSCULOSKELETAL: No clubbing or cyanosis, 0+ pitting edema lower extremities  NEUROLOGIC:  Mental Status Exam:  Level of Alertness:   awake  PSYCHIATRIC:   person, place, time  SKIN:  no bruising or bleeding    DATA:    CBC: No results for input(s): WBC, HGB, HCT, PLT in the last 72 hours. BMP:  No results for input(s): NA, K, CL, CO2, BUN, CREATININE, GLUCOSE in the last 72 hours. Hepatic: No results for input(s): AST, ALT, ALB, BILITOT, ALKPHOS in the last 72 hours. Mag:    No results for input(s): MG in the last 72 hours. Phos:   No results for input(s): PHOS in the last 72 hours. INR: No results for input(s): INR in the last 72 hours. Radiology Review: Images personally reviewed by me. CT - fat containing hernia      IMPRESSION/RECOMMENDATIONS:    65 yo with incisional hernia  1. Discussed his diagnosis and recommendation for repair. Risks of operation and typical recovery reviewed. 2.  Patient wishes to wait if he can due to work schedule. As it is small and no pain that is fine, but instructed to call if worsened symptoms. Otherwise he plans to have done in a few months.     Electronically signed by Elysia Starks, [address] [place] [address]  [id number]

## 2022-05-19 ENCOUNTER — TELEPHONE (OUTPATIENT)
Dept: SURGERY | Age: 57
End: 2022-05-19

## 2022-05-19 NOTE — TELEPHONE ENCOUNTER
Spoke with the patient, unable to schedule for 6/6/22. Patient will look at this schedule and call back to confirm for 5/31 or 6/10. 09-Mar-2021 19:47

## 2022-05-19 NOTE — LETTER
Surgery Scheduling Form:  DEMOGRAPHICS:                                                                                                         .  Patient Name:  Carol Gaytan  Patient :  1965   Patient SS#:      Patient Phone:  [phone] (home) [phone] (work)                        Alt. Patient Phone:                     Patient Address:  [address] [place] [id number]  PCP:  PARVEZ Hamilton CNP  Insurance:  Payor: [address] [place], [place] [address] / Plan: [address] [place] [address] / Product Type: *No Product type* /        Insurance ID Number:    Payor/Plan Subscr  Sex Relation Sub. Ins. ID Effective Group Num   1. Deleonton C 1965 Male Self [id number]                                   [address]     Interpretor Needed:  (NO)  (TYPE)           LATEX ALLERGY:  (NO)  Allergies: PCN and Prednisone  Defibulator or Pacemaker:  (NO)    DIAGNOSIS & PROCEDURE:                                                                                       .  Diagnosis Code/Description:   Incisional hernia N43.2  Operation Code/Description:  Robotic incisional hernia repair with mesh, possible open [place] [address]  Location:  [facility]       Surgeon:  Dr. Bronwyn Bonilla:                                                                                    .  Surgeon's Scheduling Instruction:  elective  Requested Date: 22     OR Time: 1130 am          Patient Arrival Time: 930 am  OR Time Required:  120  Minutes  Anesthesia:  General       Equipment:  robot                                                            SA Required (only for Mac and Gen): yes  Status:  Outpatient        Standard C-Arm (only for port and devan):  n/a   Mini C-Arm: No   PAT Required:   Yes                                          H&P needs to be completed: yes  Cardiac Clearance Requested:  (NO)               PRE-CERTIFICATION INFORMATION: Kb Chambers   Procedure/CPT code: Robotic incisional hernia repair with mesh, possible open [address]        Modifier:

## 2022-05-23 NOTE — TELEPHONE ENCOUNTER
Spoke with the patient, scheduled for dv incisional hernia  on 5/31/22 at 1130 am with an arrival of 930 am. Informed the patient that PAT will also call with further instructions. Informed the patient that [facility] paperwork can be sent to our office and will be completed in a timely manner. Patient verbally states that he/she understands pre-op instructions. Patient notified of pre-op instructions for general/mac anesthesia:    *NPO after midnight     *H&P prior to surgery     *Cardiac clearance, if needed. *Stop all blood thinners, if needed. *Will need a     *Call the office with any further questions. *Procedure/Surgery time at this point is tentative time as PAT will confirm arrival time.

## 2022-05-23 NOTE — TELEPHONE ENCOUNTER
Yuliya Horner 3 days ago     SE       Pt called and said you gave him 2 days to choose for his hernia sx with Dr. José Antonio Regalado  5/31 or 6/10. He said he would like sx on 5/31 anytime that day. Please call him and thank you!

## 2022-05-23 NOTE — PROGRESS NOTES
Adair Shepherd    Age 64 y.o.    male    1965    MRN [id number]    5/31/2022  Arrival Time_____________  OR Time____________[address] [place]     Procedure(s):  ROBOTIC INCISIONAL HERNIA REPAIR WITH MESH, POSSIBLE OPEN PROCEDURE                      General   Surgeon(s):  Chandra Valdez, MD      DAY ADMIT ___  SDS/OP ___  OUTPT IN BED ___        Phone [phone] (home) [phone] (work)    PCP _____________________ Phone_________________ [address] ( ) Epic CE ( ) Appt ________    ADDITIONAL INFO __________________________________ Cardio/Consult _____________    NOTES _____________________________________________________________________    ____________________________________________________________________________    PAT APPT DATE:________ TIME: ________  FAXED QAD: _______  (__) H&P w/ Hospitalist  __________________________________________________________________________  Preop Nurse phone screen complete: _____________  (__) CBC     (__) W/ DIFF ___________     (__) Hgb A1C    ___________  (__) CHEST X RAY   __________  (__) LIPID PROFILE  ___________  (__) EKG   __________  (__) PT/PTT   ___________  (__) PFT's   __________  (__) BMP   ___________  (__) CAROTIDS  __________  (__) CMP   ___________  (__) VEIN MAPPING  __________  (__) U/A   ___________  (__) HISTORY & PHYSICAL __________  (__) URINE C & S  ___________  (__) CARDIAC CLEARANCE __________  (__) U/A W/ FLEX  ___________  (__) PULM.  CLEARANCE __________  (__) SERUM PREGNANCY ___________  (__) Check Epic DOS orders __________  (__) TYPE & SCREEN __________repeat ( ) (__)  __________________ __________  (__) ALBUMIN [place] ___________  (__)  __________________ __________  (__) TRANSFERRIN  ___________  (__)  __________________ __________  (__) LIVER PROFILE  ___________  (__)  __________________ __________  (__) MRSA NASAL SWAB ___________  (__) URINE PREG DOS __________  (__) SED RATE  ___________  (__) BLOOD SUGAR DOS __________  (__) C-REACTIVE PROTEIN ___________    (__) VITAMIN D HYDROXY ___________  (__) BLOOD THINNERS __________    (__) ACE/ ARBS: _____________________     (__) BETABLOCKERS __________________

## 2022-05-24 ENCOUNTER — TELEPHONE (OUTPATIENT)
Dept: FAMILY MEDICINE CLINIC | Age: 57
End: 2022-05-24

## 2022-05-24 NOTE — TELEPHONE ENCOUNTER
----- Message from Cecil Navas sent at 5/24/2022  8:53 AM EDT -----  Subject: Appointment Request    Reason for Call: Routine Pre-Op    QUESTIONS  Type of Appointment? Established Patient  Reason for appointment request? Available appointments did not meet   patient need  Additional Information for Provider? Patient is having surgery on 05/31. Patient needs a pre-op appointment. Patient will do vv, if needed in stead   of in office. Please call patient to schedule.  ---------------------------------------------------------------------------  --------------  CALL BACK INFO  What is the best way for the office to contact you? OK to leave message on   voicemail  Preferred Call Back Phone Number? [phone]  ---------------------------------------------------------------------------  --------------  SCRIPT ANSWERS  Relationship to Patient? Self  Do you have questions for your provider that need to be answered prior to   scheduling your pre-op appointment? No  Have you been diagnosed with, awaiting test results for, or told that you   are suspected of having COVID-19 (Coronavirus)? (If patient has tested   negative or was tested as a requirement for work, school, or travel and   not based on symptoms, answer no)? Yes  Did your symptoms begin within the past 10 days or was your positive test   result within the past 10 days? No  Within the past 10 days have you developed any of the following symptoms   (answer no if symptoms have been present longer than 10 days or began   more than 10 days ago)? Fever or Chills, Cough, Shortness of breath or   difficulty breathing, Loss of taste or smell, Sore throat, Nasal   congestion, Sneezing or runny nose, Fatigue or generalized body aches   (answer no if pain is specific to a body part e.g. back pain), Diarrhea,   Headache? No  Have you had close contact with someone with COVID-19 in the last 7 days?    No  (Service Expert  click yes below to proceed with [employer] As Usual   Scheduling)?  Yes

## 2022-05-24 NOTE — PROGRESS NOTES
1. Do not eat or drink anything after 12 midnight prior to surgery. This includes no water, chewing gum mints, or ice chips. You may brush your teeth and gargle the day of surgery but DO NOT SWALLOW THE WATER. 2. Please see your family doctor/pediatrician for a history and physical and/or concerning medications. Bring any test results/reports from your physician's office. If you are under the care of a heart doctor or specialist please be aware that you may be asked to see him or her for clearance. 3. You may be asked to stop blood thinners such as Coumadin, Plavix, Fragmin, and Lovenox or Anti-inflammatories such as Aspirin, Ibuprofen, Advil, and Naproxen prior to your surgery. Please check with your doctor before stopping these or any other medications. 4. Do not smoke, and do not drink any alcoholic beverages 24 hours prior to surgery. 5. You MUST make arrangements for a responsible adult to take you home after your surgery. For your safety, you will not be allowed to leave alone or drive yourself home. Your surgery will be cancelled if you do not have a ride home. Also for your safety, it is strongly suggested someone stay with you the first 24 hrs after your surgery. 6. A parent/legal guardian must accompany a child scheduled for surgery and plan to stay at the hospital until the child is discharged. Please do not bring other children with you. 7. For your comfort,please wear simple, loose fitting clothing to the hospital.  Please do not bring valuables (money, credit cards, checkbooks, etc.) Do not wear any makeup (including no eye makeup) or nail polish on your fingers or toes. 8. For your safety, please DO NOT wear any jewelry or piercings on day of surgery. All body piercing jewelry must be removed. 9. If you have dentures, they will be removed before going to the OR; for your convenience we will provide you with a container.   If you wear contact lenses or glasses, they will be removed, they will be removed, please bring a case for them. 10. If appicable,Please see your family doctor/pediatrician for a history & physical and/or concerning medications. Bring any test results/reports from your physician's office. 11. Remember to bring Blood Bank bracelet to the hospital on the day of surgery. 12. If you have a Living Will and Durable Power of  for Healthcare, please bring in a copy. 15. Notify your Surgeon if you develop any illness between now and surgery  time, cough, cold, fever, sore throat, nausea, vomiting, etc.  Please notify your surgeon if you experience dizziness, shortness of breath or blurred vision between now & the time of your surgery   14. DO NOT shave your operative site 96 hours prior to surgery. For face & neck surgery, men may use an electric razor 48 hours prior to surgery. 15. Shower the night before surgery with ___Antibacterial soap ___Hibiclens   16. To provide excellent care visitors will be limited to one in the room at any given time. 17.  Please bring picture ID and insurance card. 18.  Visit our web site for additional information:  [employer]/surgery.            L/D on lisinopril evening of Sunday, 29th

## 2022-05-24 NOTE — TELEPHONE ENCOUNTER
Future Appointments   Date Time Provider Leonila Price   5/27/2022 11:20 AM PARVEZ Goldberg - SHILPI LEIJA

## 2022-05-27 ENCOUNTER — OFFICE VISIT (OUTPATIENT)
Dept: FAMILY MEDICINE CLINIC | Age: 57
End: 2022-05-27
Payer: COMMERCIAL

## 2022-05-27 VITALS
RESPIRATION RATE: 18 BRPM | SYSTOLIC BLOOD PRESSURE: 118 MMHG | WEIGHT: 201 LBS | HEART RATE: 82 BPM | DIASTOLIC BLOOD PRESSURE: 84 MMHG | OXYGEN SATURATION: 96 % | BODY MASS INDEX: 29.68 KG/M2

## 2022-05-27 DIAGNOSIS — R29.818 SUSPECTED SLEEP APNEA: ICD-10-CM

## 2022-05-27 DIAGNOSIS — Z01.818 PREOP EXAMINATION: Primary | ICD-10-CM

## 2022-05-27 DIAGNOSIS — E78.2 MIXED HYPERLIPIDEMIA: ICD-10-CM

## 2022-05-27 DIAGNOSIS — C18.2 MALIGNANT NEOPLASM OF ASCENDING COLON (HCC): ICD-10-CM

## 2022-05-27 DIAGNOSIS — E87.1 HYPONATREMIA: ICD-10-CM

## 2022-05-27 DIAGNOSIS — D63.8 ANEMIA, CHRONIC DISEASE: ICD-10-CM

## 2022-05-27 DIAGNOSIS — R79.89 LOW SERUM VITAMIN D: ICD-10-CM

## 2022-05-27 DIAGNOSIS — I10 HYPERTENSION, UNSPECIFIED TYPE: ICD-10-CM

## 2022-05-27 PROBLEM — G47.00 INSOMNIA: Status: ACTIVE | Noted: 2022-05-27

## 2022-05-27 PROBLEM — K90.89 BILE ACID MALABSORPTION SYNDROME: Status: ACTIVE | Noted: 2019-04-04

## 2022-05-27 PROBLEM — Z90.49 HISTORY OF COLECTOMY: Status: ACTIVE | Noted: 2022-05-19

## 2022-05-27 PROBLEM — K58.0 IRRITABLE BOWEL SYNDROME WITH DIARRHEA: Status: ACTIVE | Noted: 2022-05-19

## 2022-05-27 LAB
A/G RATIO: 2.2 (ref 1.1–2.2)
ALBUMIN SERPL-MCNC: 4.9 G/DL (ref 3.4–5)
ALP BLD-CCNC: 138 U/L (ref 40–129)
ALT SERPL-CCNC: 47 U/L (ref 10–40)
ANION GAP SERPL CALCULATED.3IONS-SCNC: 16 MMOL/L (ref 3–16)
AST SERPL-CCNC: 29 U/L (ref 15–37)
ATYPICAL LYMPHOCYTE RELATIVE PERCENT: 1 % (ref 0–6)
BANDED NEUTROPHILS RELATIVE PERCENT: 2 % (ref 0–7)
BASOPHILS ABSOLUTE: 0 K/UL (ref 0–0.2)
BASOPHILS RELATIVE PERCENT: 0 %
BILIRUB SERPL-MCNC: 0.4 MG/DL (ref 0–1)
BUN BLDV-MCNC: 10 MG/DL (ref 7–20)
CALCIUM SERPL-MCNC: 9.5 MG/DL (ref 8.3–10.6)
CHLORIDE BLD-SCNC: 100 MMOL/L (ref 99–110)
CHOLESTEROL, FASTING: 194 MG/DL (ref 0–199)
CO2: 22 MMOL/L (ref 21–32)
CREAT SERPL-MCNC: 0.9 MG/DL (ref 0.9–1.3)
EOSINOPHILS ABSOLUTE: 0 K/UL (ref 0–0.6)
EOSINOPHILS RELATIVE PERCENT: 0 %
GFR AFRICAN AMERICAN: >60
GFR NON-AFRICAN AMERICAN: >60
GLUCOSE BLD-MCNC: 86 MG/DL (ref 70–99)
HCT VFR BLD CALC: 45.4 % (ref 40.5–52.5)
HDLC SERPL-MCNC: 32 MG/DL (ref 40–60)
HEMOGLOBIN: 15.5 G/DL (ref 13.5–17.5)
LDL CHOLESTEROL CALCULATED: 111 MG/DL
LYMPHOCYTES ABSOLUTE: 0.9 K/UL (ref 1–5.1)
LYMPHOCYTES RELATIVE PERCENT: 10 %
MCH RBC QN AUTO: 30.3 PG (ref 26–34)
MCHC RBC AUTO-ENTMCNC: 34.2 G/DL (ref 31–36)
MCV RBC AUTO: 88.6 FL (ref 80–100)
MONOCYTES ABSOLUTE: 0.8 K/UL (ref 0–1.3)
MONOCYTES RELATIVE PERCENT: 9 %
NEUTROPHILS ABSOLUTE: 6.9 K/UL (ref 1.7–7.7)
NEUTROPHILS RELATIVE PERCENT: 78 %
PDW BLD-RTO: 12.9 % (ref 12.4–15.4)
PLATELET # BLD: 234 K/UL (ref 135–450)
PMV BLD AUTO: 8.2 FL (ref 5–10.5)
POTASSIUM SERPL-SCNC: 4.1 MMOL/L (ref 3.5–5.1)
RBC # BLD: 5.12 M/UL (ref 4.2–5.9)
RBC # BLD: NORMAL 10*6/UL
SODIUM BLD-SCNC: 138 MMOL/L (ref 136–145)
T4 FREE: 1 NG/DL (ref 0.9–1.8)
TOTAL PROTEIN: 7.1 G/DL (ref 6.4–8.2)
TRIGLYCERIDE, FASTING: 255 MG/DL (ref 0–150)
TSH REFLEX: 6.23 UIU/ML (ref 0.27–4.2)
VITAMIN D 25-HYDROXY: 28.2 NG/ML
VLDLC SERPL CALC-MCNC: 51 MG/DL
WBC # BLD: 8.6 K/UL (ref 4–11)

## 2022-05-27 PROCEDURE — 93000 ELECTROCARDIOGRAM COMPLETE: CPT | Performed by: NURSE PRACTITIONER

## 2022-05-27 PROCEDURE — 99214 OFFICE O/P EST MOD 30 MIN: CPT | Performed by: NURSE PRACTITIONER

## 2022-05-27 RX ORDER — MONTELUKAST SODIUM 4 MG/1
TABLET, CHEWABLE ORAL
COMMUNITY
Start: 2022-05-19 | End: 2022-08-03 | Stop reason: SDUPTHER

## 2022-05-27 ASSESSMENT — PATIENT HEALTH QUESTIONNAIRE - PHQ9
SUM OF ALL RESPONSES TO PHQ QUESTIONS 1-9: 0
SUM OF ALL RESPONSES TO PHQ QUESTIONS 1-9: 0
1. LITTLE INTEREST OR PLEASURE IN DOING THINGS: 0
SUM OF ALL RESPONSES TO PHQ QUESTIONS 1-9: 0
SUM OF ALL RESPONSES TO PHQ9 QUESTIONS 1 & 2: 0
2. FEELING DOWN, DEPRESSED OR HOPELESS: 0
SUM OF ALL RESPONSES TO PHQ QUESTIONS 1-9: 0

## 2022-05-27 ASSESSMENT — ENCOUNTER SYMPTOMS
CHEST TIGHTNESS: 0
SHORTNESS OF BREATH: 0
NAUSEA: 0
ALLERGIC/IMMUNOLOGIC COMMENTS: FLONASE
TROUBLE SWALLOWING: 0
EYE ITCHING: 0
EYE REDNESS: 0
DIARRHEA: 1
COLOR CHANGE: 0
CONSTIPATION: 0
COUGH: 0
SORE THROAT: 0
SINUS PRESSURE: 0
ABDOMINAL PAIN: 1
WHEEZING: 0

## 2022-05-27 NOTE — PROGRESS NOTES
Preoperative Consultation      Verito Tee  YOB: 1965    Date of Service:  5/27/2022    Vitals:    05/27/22 1113   BP: 118/84   Site: Left Upper Arm   Position: Sitting   Pulse: 82   Resp: 18   SpO2: 96%   Weight: 201 lb (91.2 kg)     Wt Readings from Last 2 Encounters:   05/27/22 201 lb (91.2 kg)   04/25/22 202 lb (91.6 kg)     BP Readings from Last 3 Encounters:   05/27/22 118/84   04/25/22 (!) 106/54   08/27/21 110/80        Chief Complaint   Patient presents with    Pre-op Exam     Hernia repair on 5/31/22 @ [facility] with Dr Goyo Xiong     Allergies   Allergen Reactions    Penicillins     Prednisone Nausea And Vomiting     Outpatient Medications Marked as Taking for the 5/27/22 encounter (Office Visit) with PARVEZ Ann CNP   Medication Sig Dispense Refill    colestipol (COLESTID) 1 g tablet TAKE 1 TABLET BY MOUTH TWICE A DAY      amLODIPine (NORVASC) 5 MG tablet TAKE 1 TABLET BY MOUTH EVERY DAY 90 tablet 0    fluticasone (FLONASE) 50 MCG/ACT nasal spray SHAKE LIQUID AND USE 2 SPRAYS IN EACH NOSTRIL DAILY 16 g 2    lisinopril-hydroCHLOROthiazide (PRINZIDE;ZESTORETIC) 20-25 MG per tablet TAKE 1 TABLET BY MOUTH EVERY DAY 90 tablet 0    vitamin D (ERGOCALCIFEROL) 1.25 MG (04430 UT) CAPS capsule Take 1 capsule by mouth once a week 12 capsule 1    amitriptyline (ELAVIL) 50 MG tablet Take 1 tablet by mouth nightly 30 tablet 11    pantoprazole (PROTONIX) 20 MG tablet Take 1 tablet by mouth 2 times daily (before meals) 180 tablet 1       This patient presents to the office today for a preoperative consultation at the request of surgeon, Dr. Aleksandar Lemus, who plans on performing Robotic incisional hernia repair with mesh, possible open procedure on May31 at [facility].  The current problembegan 2 yearsago, and symptoms have been worsening with time.   Conservative therapy:No.    Planned anesthesia: General   Known anesthesia problems:None   Bleeding risk: No recent or remote history of abnormal bleeding  Personal or FHof DVT/PE: No  Patient objection to receiving blood products: No    Patient Active Problem List   Diagnosis    Iron deficiency anemia due to chronic blood loss    Polyp of transverse colon    Colon tumor    Bile acid malabsorption syndrome    Iron deficiency anemia secondary to blood loss (chronic)    Anemia, chronic disease    Malignant neoplasm of ascending colon (HCC)    Colon cancer ([address]    Carcinoma of ascending colon ([address]    Moderate malnutrition ([address]    Postprocedural intraabdominal abscess    Sebaceous cyst    Suspected sleep apnea    Hypertensive disorder    Bilateral nephrolithiasis    Polyp of descending colon    Colon, diverticulosis    Personal history of colon cancer, stage I    Insomnia    History of colectomy    Irritable bowel syndrome with diarrhea       Past Medical History:   Diagnosis Date    Colon cancer ([address] 04/2019    GERD (gastroesophageal reflux disease)     resolved    Kidney stones     Malignant neoplasm of ascending colon ([facility] 4/9/2019    Uncontrolled hypertension 7/19/2019     Past Surgical History:   Procedure Laterality Date    APPENDECTOMY      COLON SURGERY      I & D of colon abscess [post op    COLONOSCOPY N/A 4/4/2019    COLONOSCOPY WITH BIOPSY performed by Chanell Brito MD at [facility] [address]  4/4/2019    COLONOSCOPY POLYPECTOMY SNARE/COLD BIOPSY performed by Chanell Brito MD at [address] COLONOSCOPY N/A 6/12/2020    COLONOSCOPY POLYPECTOMY SNARE performed by Pina Garay MD at [address] Right 4/16/2019    ROBOTIC LAPAROSCOPIC RIGHT COLECTOMY CPT CODE - 33031 performed by Niki Blake MD at [address] N/A 4/4/2019    EGD performed by Chanell Brito MD at [address]     Family History   Problem Relation Age of Onset    Other Mother diverticulitis    Diabetes Father     Cancer Father         bladder  w mets     Social History     Socioeconomic History    Marital status:      Spouse name: Not on file    Number of children: Not on file    Years of education: Not on file    Highest education level: Not on file   Occupational History    Not on file   Tobacco Use    Smoking status: Never Smoker    Smokeless tobacco: Never Used   Vaping Use    Vaping Use: Not on file   Substance and Sexual Activity    Alcohol use: Not Currently     Comment: occasional    Drug use: Never    Sexual activity: Not on file   Other Topics Concern    Not on file   Social History Narrative    Not on file     Social Determinants of Health     Financial Resource Strain: Low Risk     Difficulty of Paying Living Expenses: Not hard at all   Food Insecurity: No Food Insecurity    Worried About Running Out of Food in the Last Year: Never true    [address] in the Last Year: Never true   Transportation Needs:     Lack of Transportation (Medical): Not on file    Lack of Transportation (Non-Medical):  Not on file   Physical Activity:     Days of Exercise per Week: Not on file    Minutes of Exercise per Session: Not on file   Stress:     Feeling of Stress : Not on file   Social Connections:     Frequency of Communication with Friends and Family: Not on file    Frequency of Social Gatherings with Friends and Family: Not on file    Attends Jewish Services: Not on file    Active Member of Clubs or Organizations: Not on file    Attends Club or Organization Meetings: Not on file    Marital Status: Not on file   Intimate Partner Violence:     Fear of Current or Ex-Partner: Not on file    Emotionally Abused: Not on file    Physically Abused: Not on file    Sexually Abused: Not on file   Housing Stability:     Unable to Pay for Housing in the Last Year: Not on file    Number of Places Lived in the Last Year: Not on file    Unstable Housing in the Last Year: Not on file       Review of Systems    Review of Systems   Constitutional: Negative for activity change, chills, fatigue, fever and unexpected weight change. HENT: Negative for ear discharge, mouth sores, postnasal drip, sinus pressure, sore throat and trouble swallowing. Eyes: Negative for redness, itching and visual disturbance (needs readers). Contacts   Respiratory: Negative for cough, chest tightness, shortness of breath and wheezing. Cardiovascular: Negative for chest pain, palpitations and leg swelling. Htn- stable on amlodipine and zestoretic   Gastrointestinal: Positive for abdominal pain and diarrhea. Negative for constipation and nausea. Hx colon cancer- resected  GERd- protonix   Genitourinary: Negative for dysuria, frequency and urgency. Musculoskeletal: Negative for arthralgias, joint swelling and myalgias. Skin: Negative for color change, pallor and rash. Allergic/Immunologic: Positive for environmental allergies. Negative for food allergies and immunocompromised state. Flonase   Neurological: Negative for dizziness, syncope, weakness and headaches. Hematological: Does not bruise/bleed easily. Psychiatric/Behavioral: Negative for behavioral problems, hallucinations and sleep disturbance. The patient is not nervous/anxious. PhysicalExam     Physical Exam  Vitals and nursing note reviewed. Constitutional:       General: He is not in acute distress. Appearance: He is well-developed. He is obese. He is not diaphoretic. HENT:      Head: Normocephalic and atraumatic. Right Ear: Tympanic membrane, ear canal and external ear normal.      Left Ear: Tympanic membrane, ear canal and external ear normal.      Nose: Nose normal.      Mouth/Throat:      Mouth: Mucous membranes are moist.      Pharynx: No oropharyngeal exudate or posterior oropharyngeal erythema.       Comments: Narrow palate  Eyes:      General:         Right eye: No discharge. Left eye: No discharge. Conjunctiva/sclera: Conjunctivae normal.      Pupils: Pupils are equal, round, and reactive to light. Cardiovascular:      Rate and Rhythm: Normal rate and regular rhythm. Heart sounds: Normal heart sounds. No murmur heard. No friction rub. No gallop. Pulmonary:      Effort: Pulmonary effort is normal. No respiratory distress. Breath sounds: Normal breath sounds. No wheezing or rales. Abdominal:      General: Bowel sounds are normal. There is no distension. Palpations: Abdomen is soft. Tenderness: There is no abdominal tenderness. Hernia: A hernia (ventral) is present. Musculoskeletal:         General: No tenderness. Normal range of motion. Cervical back: Normal range of motion and neck supple. Lymphadenopathy:      Cervical: No cervical adenopathy. Skin:     General: Skin is warm and dry. Coloration: Skin is not pale. Findings: No erythema or rash. Neurological:      General: No focal deficit present. Mental Status: He is alert and oriented to person, place, and time. Motor: No abnormal muscle tone. Coordination: Coordination normal.   Psychiatric:         Mood and Affect: Mood normal.         Behavior: Behavior normal.         Thought Content: Thought content normal.         Judgment: Judgment normal.         EKG Interpretation:  normal EKG, normal sinus rhythm, there are no previous tracings available for comparison. Lab Review   Hospital Outpatient Visit on 04/22/2022   Component Date Value    POC Creatinine 04/22/2022 1.0     GFR Non- 04/22/2022 >60     GFR  04/22/2022 >60     Sample Type 04/22/2022 AGUILA     Performed on 04/22/2022 SEE BELOW            Assessment:       64 y.o. patient with plannedsurgery as above.     Known risk factors for perioperative complications: Hypertension  Currentmedications which may produce withdrawal symptoms if withheld perioperatively: none     Plan:     1. Preoperative workup as follows:ECG  2. Change in medication regimen before surgery: Hold all medications on morning of surgery  3. Prophylaxis for cardiac events with perioperativebeta-blockers: Not indicated  ACC/AHA indications forpre-operative beta-blocker use:    · Vascular surgery with history of postitive stress test  · Intermediateor high risk surgery with history of CAD   · Intermediate or high risk surgery with multiple clinicalpredictors of CAD- 2 of the following: history of compensated or prior heart failure,history of cerebrovascular disease, DM, or renal insufficiency    Routine administration of higher-dose, long-acting metoprolol in beta-blocker-naïve patients on the day of surgery, and in the absence of dose titrationis associated with an overall increasein mortality. Beta-blockers should be started days to weeks prior to surgery andtitrated to pulse < 70. 4.Deep vein thrombosis prophylaxis: regimen to be chosen by surgical team  5.  No contraindications to planned surgery    Venkat Holley, APRN - CNP

## 2022-05-31 ENCOUNTER — ANESTHESIA (OUTPATIENT)
Dept: OPERATING ROOM | Age: 57
End: 2022-05-31
Payer: COMMERCIAL

## 2022-05-31 ENCOUNTER — ANESTHESIA EVENT (OUTPATIENT)
Dept: OPERATING ROOM | Age: 57
End: 2022-05-31
Payer: COMMERCIAL

## 2022-05-31 ENCOUNTER — HOSPITAL ENCOUNTER (OUTPATIENT)
Age: 57
Setting detail: OBSERVATION
Discharge: HOME | End: 2022-06-03
Attending: SURGERY | Admitting: SURGERY
Payer: COMMERCIAL

## 2022-05-31 DIAGNOSIS — G89.18 POSTOPERATIVE PAIN: Primary | ICD-10-CM

## 2022-05-31 PROBLEM — Z98.890 HISTORY OF INCISIONAL HERNIA REPAIR: Status: ACTIVE | Noted: 2022-05-31

## 2022-05-31 PROBLEM — Z87.19 HISTORY OF INCISIONAL HERNIA REPAIR: Status: ACTIVE | Noted: 2022-05-31

## 2022-05-31 LAB
ANION GAP SERPL CALCULATED.3IONS-SCNC: 11 MMOL/L (ref 3–16)
ANION GAP SERPL CALCULATED.3IONS-SCNC: 9 MMOL/L (ref 3–16)
BASOPHILS ABSOLUTE: 0 K/UL (ref 0–0.2)
BASOPHILS RELATIVE PERCENT: 0.2 %
BUN BLDV-MCNC: 12 MG/DL (ref 7–20)
BUN BLDV-MCNC: 14 MG/DL (ref 7–20)
CALCIUM SERPL-MCNC: 8.6 MG/DL (ref 8.3–10.6)
CALCIUM SERPL-MCNC: 8.9 MG/DL (ref 8.3–10.6)
CHLORIDE BLD-SCNC: 100 MMOL/L (ref 99–110)
CHLORIDE BLD-SCNC: 102 MMOL/L (ref 99–110)
CO2: 22 MMOL/L (ref 21–32)
CO2: 23 MMOL/L (ref 21–32)
CREAT SERPL-MCNC: 0.8 MG/DL (ref 0.9–1.3)
CREAT SERPL-MCNC: 0.8 MG/DL (ref 0.9–1.3)
EOSINOPHILS ABSOLUTE: 0 K/UL (ref 0–0.6)
EOSINOPHILS RELATIVE PERCENT: 0.2 %
GFR AFRICAN AMERICAN: >60
GFR AFRICAN AMERICAN: >60
GFR NON-AFRICAN AMERICAN: >60
GFR NON-AFRICAN AMERICAN: >60
GLUCOSE BLD-MCNC: 110 MG/DL (ref 70–99)
GLUCOSE BLD-MCNC: 117 MG/DL (ref 70–99)
HCT VFR BLD CALC: 43.9 % (ref 40.5–52.5)
HEMOGLOBIN: 14.9 G/DL (ref 13.5–17.5)
LYMPHOCYTES ABSOLUTE: 0.9 K/UL (ref 1–5.1)
LYMPHOCYTES RELATIVE PERCENT: 7.7 %
MCH RBC QN AUTO: 30.3 PG (ref 26–34)
MCHC RBC AUTO-ENTMCNC: 33.9 G/DL (ref 31–36)
MCV RBC AUTO: 89.5 FL (ref 80–100)
MONOCYTES ABSOLUTE: 0.6 K/UL (ref 0–1.3)
MONOCYTES RELATIVE PERCENT: 5.5 %
NEUTROPHILS ABSOLUTE: 9.9 K/UL (ref 1.7–7.7)
NEUTROPHILS RELATIVE PERCENT: 86.4 %
PDW BLD-RTO: 13.3 % (ref 12.4–15.4)
PLATELET # BLD: 243 K/UL (ref 135–450)
PMV BLD AUTO: 7.5 FL (ref 5–10.5)
POTASSIUM REFLEX MAGNESIUM: 4 MMOL/L (ref 3.5–5.1)
POTASSIUM SERPL-SCNC: 4.3 MMOL/L (ref 3.5–5.1)
RBC # BLD: 4.91 M/UL (ref 4.2–5.9)
SODIUM BLD-SCNC: 133 MMOL/L (ref 136–145)
SODIUM BLD-SCNC: 134 MMOL/L (ref 136–145)
WBC # BLD: 11.4 K/UL (ref 4–11)

## 2022-05-31 PROCEDURE — 2580000003 HC RX 258

## 2022-05-31 PROCEDURE — 2709999900 HC NON-CHARGEABLE SUPPLY: Performed by: SURGERY

## 2022-05-31 PROCEDURE — 3700000000 HC ANESTHESIA ATTENDED CARE: Performed by: SURGERY

## 2022-05-31 PROCEDURE — G0378 HOSPITAL OBSERVATION PER HR: HCPCS

## 2022-05-31 PROCEDURE — 6360000002 HC RX W HCPCS: Performed by: ANESTHESIOLOGY

## 2022-05-31 PROCEDURE — 6370000000 HC RX 637 (ALT 250 FOR IP): Performed by: SURGERY

## 2022-05-31 PROCEDURE — 6360000002 HC RX W HCPCS

## 2022-05-31 PROCEDURE — 3600000019 HC SURGERY ROBOT ADDTL 15MIN: Performed by: SURGERY

## 2022-05-31 PROCEDURE — 6360000002 HC RX W HCPCS: Performed by: SURGERY

## 2022-05-31 PROCEDURE — 49654 PR LAP, INCISIONAL HERNIA REPAIR,REDUCIBLE: CPT | Performed by: SURGERY

## 2022-05-31 PROCEDURE — 7100000000 HC PACU RECOVERY - FIRST 15 MIN: Performed by: SURGERY

## 2022-05-31 PROCEDURE — C1781 MESH (IMPLANTABLE): HCPCS | Performed by: SURGERY

## 2022-05-31 PROCEDURE — 80048 BASIC METABOLIC PNL TOTAL CA: CPT

## 2022-05-31 PROCEDURE — S2900 ROBOTIC SURGICAL SYSTEM: HCPCS | Performed by: SURGERY

## 2022-05-31 PROCEDURE — 7100000011 HC PHASE II RECOVERY - ADDTL 15 MIN: Performed by: SURGERY

## 2022-05-31 PROCEDURE — 85025 COMPLETE CBC W/AUTO DIFF WBC: CPT

## 2022-05-31 PROCEDURE — 7100000010 HC PHASE II RECOVERY - FIRST 15 MIN: Performed by: SURGERY

## 2022-05-31 PROCEDURE — 2580000003 HC RX 258: Performed by: SURGERY

## 2022-05-31 PROCEDURE — 6370000000 HC RX 637 (ALT 250 FOR IP): Performed by: ANESTHESIOLOGY

## 2022-05-31 PROCEDURE — 3700000001 HC ADD 15 MINUTES (ANESTHESIA): Performed by: SURGERY

## 2022-05-31 PROCEDURE — 2500000003 HC RX 250 WO HCPCS: Performed by: SURGERY

## 2022-05-31 PROCEDURE — 7100000001 HC PACU RECOVERY - ADDTL 15 MIN: Performed by: SURGERY

## 2022-05-31 PROCEDURE — 3600000009 HC SURGERY ROBOT BASE: Performed by: SURGERY

## 2022-05-31 PROCEDURE — 2500000003 HC RX 250 WO HCPCS

## 2022-05-31 DEVICE — MESH HERN W6XL8IN ELLIPSE W/ ECHO PS POS SYS VENTRALIGHT ST: Type: IMPLANTABLE DEVICE | Site: ABDOMEN | Status: FUNCTIONAL

## 2022-05-31 RX ORDER — MECOBALAMIN 5000 MCG
5 TABLET,DISINTEGRATING ORAL NIGHTLY
Status: DISCONTINUED | OUTPATIENT
Start: 2022-05-31 | End: 2022-06-03 | Stop reason: HOSPADM

## 2022-05-31 RX ORDER — SODIUM CHLORIDE 0.9 % (FLUSH) 0.9 %
5-40 SYRINGE (ML) INJECTION PRN
Status: DISCONTINUED | OUTPATIENT
Start: 2022-05-31 | End: 2022-05-31

## 2022-05-31 RX ORDER — MIDAZOLAM HYDROCHLORIDE 1 MG/ML
INJECTION INTRAMUSCULAR; INTRAVENOUS PRN
Status: DISCONTINUED | OUTPATIENT
Start: 2022-05-31 | End: 2022-05-31 | Stop reason: SDUPTHER

## 2022-05-31 RX ORDER — SODIUM CHLORIDE, SODIUM LACTATE, POTASSIUM CHLORIDE, CALCIUM CHLORIDE 600; 310; 30; 20 MG/100ML; MG/100ML; MG/100ML; MG/100ML
INJECTION, SOLUTION INTRAVENOUS CONTINUOUS PRN
Status: DISCONTINUED | OUTPATIENT
Start: 2022-05-31 | End: 2022-05-31 | Stop reason: SDUPTHER

## 2022-05-31 RX ORDER — OXYCODONE HYDROCHLORIDE 5 MG/1
5 TABLET ORAL PRN
Status: COMPLETED | OUTPATIENT
Start: 2022-05-31 | End: 2022-05-31

## 2022-05-31 RX ORDER — ONDANSETRON 2 MG/ML
4 INJECTION INTRAMUSCULAR; INTRAVENOUS
Status: DISCONTINUED | OUTPATIENT
Start: 2022-05-31 | End: 2022-05-31

## 2022-05-31 RX ORDER — OXYCODONE HYDROCHLORIDE 5 MG/1
5-10 TABLET ORAL EVERY 6 HOURS PRN
Qty: 20 TABLET | Refills: 0 | Status: SHIPPED | OUTPATIENT
Start: 2022-05-31 | End: 2022-06-05

## 2022-05-31 RX ORDER — HYDROMORPHONE HCL 110MG/55ML
1 PATIENT CONTROLLED ANALGESIA SYRINGE INTRAVENOUS
Status: DISCONTINUED | OUTPATIENT
Start: 2022-05-31 | End: 2022-06-03 | Stop reason: HOSPADM

## 2022-05-31 RX ORDER — AMITRIPTYLINE HYDROCHLORIDE 25 MG/1
50 TABLET, FILM COATED ORAL NIGHTLY
Status: DISCONTINUED | OUTPATIENT
Start: 2022-05-31 | End: 2022-06-03 | Stop reason: HOSPADM

## 2022-05-31 RX ORDER — ONDANSETRON 2 MG/ML
INJECTION INTRAMUSCULAR; INTRAVENOUS PRN
Status: DISCONTINUED | OUTPATIENT
Start: 2022-05-31 | End: 2022-05-31 | Stop reason: SDUPTHER

## 2022-05-31 RX ORDER — SODIUM CHLORIDE 9 MG/ML
INJECTION, SOLUTION INTRAVENOUS PRN
Status: DISCONTINUED | OUTPATIENT
Start: 2022-05-31 | End: 2022-06-03 | Stop reason: HOSPADM

## 2022-05-31 RX ORDER — ACETAMINOPHEN 325 MG/1
650 TABLET ORAL EVERY 6 HOURS PRN
Status: DISCONTINUED | OUTPATIENT
Start: 2022-05-31 | End: 2022-06-03 | Stop reason: HOSPADM

## 2022-05-31 RX ORDER — LISINOPRIL AND HYDROCHLOROTHIAZIDE 25; 20 MG/1; MG/1
1 TABLET ORAL DAILY
Status: DISCONTINUED | OUTPATIENT
Start: 2022-06-01 | End: 2022-05-31

## 2022-05-31 RX ORDER — LISINOPRIL 20 MG/1
20 TABLET ORAL DAILY
Status: DISCONTINUED | OUTPATIENT
Start: 2022-06-01 | End: 2022-06-03 | Stop reason: HOSPADM

## 2022-05-31 RX ORDER — SODIUM CHLORIDE 0.9 % (FLUSH) 0.9 %
5-40 SYRINGE (ML) INJECTION PRN
Status: DISCONTINUED | OUTPATIENT
Start: 2022-05-31 | End: 2022-06-03 | Stop reason: HOSPADM

## 2022-05-31 RX ORDER — SODIUM CHLORIDE 9 MG/ML
25 INJECTION, SOLUTION INTRAVENOUS PRN
Status: DISCONTINUED | OUTPATIENT
Start: 2022-05-31 | End: 2022-05-31

## 2022-05-31 RX ORDER — OXYCODONE HYDROCHLORIDE 5 MG/1
5 TABLET ORAL EVERY 4 HOURS PRN
Status: DISCONTINUED | OUTPATIENT
Start: 2022-05-31 | End: 2022-06-03 | Stop reason: HOSPADM

## 2022-05-31 RX ORDER — AMLODIPINE BESYLATE 5 MG/1
5 TABLET ORAL DAILY
Status: DISCONTINUED | OUTPATIENT
Start: 2022-06-01 | End: 2022-06-03 | Stop reason: HOSPADM

## 2022-05-31 RX ORDER — ENOXAPARIN SODIUM 100 MG/ML
40 INJECTION SUBCUTANEOUS DAILY
Status: DISCONTINUED | OUTPATIENT
Start: 2022-06-01 | End: 2022-06-03 | Stop reason: HOSPADM

## 2022-05-31 RX ORDER — OXYCODONE HYDROCHLORIDE 5 MG/1
10 TABLET ORAL EVERY 4 HOURS PRN
Status: DISCONTINUED | OUTPATIENT
Start: 2022-05-31 | End: 2022-06-03 | Stop reason: HOSPADM

## 2022-05-31 RX ORDER — SODIUM CHLORIDE 0.9 % (FLUSH) 0.9 %
5-40 SYRINGE (ML) INJECTION EVERY 12 HOURS SCHEDULED
Status: DISCONTINUED | OUTPATIENT
Start: 2022-05-31 | End: 2022-05-31

## 2022-05-31 RX ORDER — ONDANSETRON 8 MG/1
4 TABLET, ORALLY DISINTEGRATING ORAL EVERY 8 HOURS PRN
Status: DISCONTINUED | OUTPATIENT
Start: 2022-05-31 | End: 2022-06-03 | Stop reason: HOSPADM

## 2022-05-31 RX ORDER — ROCURONIUM BROMIDE 10 MG/ML
INJECTION, SOLUTION INTRAVENOUS PRN
Status: DISCONTINUED | OUTPATIENT
Start: 2022-05-31 | End: 2022-05-31 | Stop reason: SDUPTHER

## 2022-05-31 RX ORDER — KETOROLAC TROMETHAMINE 30 MG/ML
INJECTION, SOLUTION INTRAMUSCULAR; INTRAVENOUS
Status: COMPLETED
Start: 2022-05-31 | End: 2022-05-31

## 2022-05-31 RX ORDER — KETOROLAC TROMETHAMINE 30 MG/ML
30 INJECTION, SOLUTION INTRAMUSCULAR; INTRAVENOUS ONCE
Status: COMPLETED | OUTPATIENT
Start: 2022-05-31 | End: 2022-05-31

## 2022-05-31 RX ORDER — PROPOFOL 10 MG/ML
INJECTION, EMULSION INTRAVENOUS PRN
Status: DISCONTINUED | OUTPATIENT
Start: 2022-05-31 | End: 2022-05-31 | Stop reason: SDUPTHER

## 2022-05-31 RX ORDER — HYDROCHLOROTHIAZIDE 25 MG/1
25 TABLET ORAL DAILY
Status: DISCONTINUED | OUTPATIENT
Start: 2022-06-01 | End: 2022-06-03 | Stop reason: HOSPADM

## 2022-05-31 RX ORDER — PANTOPRAZOLE SODIUM 20 MG/1
20 TABLET, DELAYED RELEASE ORAL
Status: DISCONTINUED | OUTPATIENT
Start: 2022-06-01 | End: 2022-06-03 | Stop reason: HOSPADM

## 2022-05-31 RX ORDER — HYDROMORPHONE HCL 110MG/55ML
PATIENT CONTROLLED ANALGESIA SYRINGE INTRAVENOUS
Status: COMPLETED
Start: 2022-05-31 | End: 2022-05-31

## 2022-05-31 RX ORDER — OXYCODONE HYDROCHLORIDE 5 MG/1
10 TABLET ORAL PRN
Status: COMPLETED | OUTPATIENT
Start: 2022-05-31 | End: 2022-05-31

## 2022-05-31 RX ORDER — ONDANSETRON 2 MG/ML
4 INJECTION INTRAMUSCULAR; INTRAVENOUS EVERY 6 HOURS PRN
Status: DISCONTINUED | OUTPATIENT
Start: 2022-05-31 | End: 2022-06-03 | Stop reason: HOSPADM

## 2022-05-31 RX ORDER — ONDANSETRON 4 MG/1
4 TABLET, FILM COATED ORAL 3 TIMES DAILY PRN
Qty: 10 TABLET | Refills: 0 | Status: SHIPPED | OUTPATIENT
Start: 2022-05-31 | End: 2022-06-20 | Stop reason: SDUPTHER

## 2022-05-31 RX ORDER — SODIUM CHLORIDE 9 MG/ML
INJECTION, SOLUTION INTRAVENOUS CONTINUOUS
Status: DISCONTINUED | OUTPATIENT
Start: 2022-05-31 | End: 2022-06-02

## 2022-05-31 RX ORDER — FENTANYL CITRATE 50 UG/ML
INJECTION, SOLUTION INTRAMUSCULAR; INTRAVENOUS PRN
Status: DISCONTINUED | OUTPATIENT
Start: 2022-05-31 | End: 2022-05-31 | Stop reason: SDUPTHER

## 2022-05-31 RX ORDER — FLUTICASONE PROPIONATE 50 MCG
2 SPRAY, SUSPENSION (ML) NASAL DAILY
Status: DISCONTINUED | OUTPATIENT
Start: 2022-05-31 | End: 2022-06-03 | Stop reason: HOSPADM

## 2022-05-31 RX ORDER — LIDOCAINE HYDROCHLORIDE 10 MG/ML
INJECTION, SOLUTION EPIDURAL; INFILTRATION; INTRACAUDAL; PERINEURAL PRN
Status: DISCONTINUED | OUTPATIENT
Start: 2022-05-31 | End: 2022-05-31 | Stop reason: SDUPTHER

## 2022-05-31 RX ORDER — DIPHENHYDRAMINE HYDROCHLORIDE 50 MG/ML
6.25 INJECTION INTRAMUSCULAR; INTRAVENOUS ONCE
Status: COMPLETED | OUTPATIENT
Start: 2022-05-31 | End: 2022-05-31

## 2022-05-31 RX ORDER — CHOLESTYRAMINE 4 G/9G
4 POWDER, FOR SUSPENSION ORAL DAILY
Status: DISCONTINUED | OUTPATIENT
Start: 2022-06-01 | End: 2022-06-03 | Stop reason: HOSPADM

## 2022-05-31 RX ORDER — HYDROMORPHONE HCL 110MG/55ML
PATIENT CONTROLLED ANALGESIA SYRINGE INTRAVENOUS PRN
Status: DISCONTINUED | OUTPATIENT
Start: 2022-05-31 | End: 2022-05-31 | Stop reason: SDUPTHER

## 2022-05-31 RX ORDER — MEPERIDINE HYDROCHLORIDE 50 MG/ML
12.5 INJECTION INTRAMUSCULAR; INTRAVENOUS; SUBCUTANEOUS EVERY 5 MIN PRN
Status: DISCONTINUED | OUTPATIENT
Start: 2022-05-31 | End: 2022-05-31

## 2022-05-31 RX ORDER — SODIUM CHLORIDE 0.9 % (FLUSH) 0.9 %
5-40 SYRINGE (ML) INJECTION EVERY 12 HOURS SCHEDULED
Status: DISCONTINUED | OUTPATIENT
Start: 2022-05-31 | End: 2022-06-03 | Stop reason: HOSPADM

## 2022-05-31 RX ORDER — DIPHENHYDRAMINE HYDROCHLORIDE 50 MG/ML
INJECTION INTRAMUSCULAR; INTRAVENOUS
Status: COMPLETED
Start: 2022-05-31 | End: 2022-05-31

## 2022-05-31 RX ORDER — KETOROLAC TROMETHAMINE 30 MG/ML
30 INJECTION, SOLUTION INTRAMUSCULAR; INTRAVENOUS EVERY 6 HOURS
Status: DISCONTINUED | OUTPATIENT
Start: 2022-05-31 | End: 2022-06-03 | Stop reason: HOSPADM

## 2022-05-31 RX ORDER — BUPIVACAINE HYDROCHLORIDE AND EPINEPHRINE 5; 5 MG/ML; UG/ML
INJECTION, SOLUTION PERINEURAL PRN
Status: DISCONTINUED | OUTPATIENT
Start: 2022-05-31 | End: 2022-05-31 | Stop reason: HOSPADM

## 2022-05-31 RX ADMIN — DIPHENHYDRAMINE HYDROCHLORIDE 6.25 MG: 50 INJECTION INTRAMUSCULAR; INTRAVENOUS at 13:25

## 2022-05-31 RX ADMIN — HYDROMORPHONE HYDROCHLORIDE 1 MG: 2 INJECTION INTRAMUSCULAR; INTRAVENOUS; SUBCUTANEOUS at 12:31

## 2022-05-31 RX ADMIN — SODIUM CHLORIDE, SODIUM LACTATE, POTASSIUM CHLORIDE, AND CALCIUM CHLORIDE: .6; .31; .03; .02 INJECTION, SOLUTION INTRAVENOUS at 09:53

## 2022-05-31 RX ADMIN — DEXMEDETOMIDINE HYDROCHLORIDE 20 MCG: 100 INJECTION, SOLUTION INTRAVENOUS at 11:38

## 2022-05-31 RX ADMIN — DEXMEDETOMIDINE HYDROCHLORIDE 12 MCG: 100 INJECTION, SOLUTION INTRAVENOUS at 12:08

## 2022-05-31 RX ADMIN — DIPHENHYDRAMINE HYDROCHLORIDE 6.25 MG: 50 INJECTION, SOLUTION INTRAMUSCULAR; INTRAVENOUS at 13:25

## 2022-05-31 RX ADMIN — KETOROLAC TROMETHAMINE 30 MG: 30 INJECTION, SOLUTION INTRAMUSCULAR; INTRAVENOUS at 18:33

## 2022-05-31 RX ADMIN — HYDROMORPHONE HYDROCHLORIDE 1 MG: 2 INJECTION INTRAMUSCULAR; INTRAVENOUS; SUBCUTANEOUS at 12:34

## 2022-05-31 RX ADMIN — HYDROMORPHONE HYDROCHLORIDE 1 MG: 2 INJECTION, SOLUTION INTRAMUSCULAR; INTRAVENOUS; SUBCUTANEOUS at 18:33

## 2022-05-31 RX ADMIN — HYDROMORPHONE HYDROCHLORIDE 0.5 MG: 1 INJECTION, SOLUTION INTRAMUSCULAR; INTRAVENOUS; SUBCUTANEOUS at 13:10

## 2022-05-31 RX ADMIN — HYDROMORPHONE HYDROCHLORIDE 0.5 MG: 1 INJECTION, SOLUTION INTRAMUSCULAR; INTRAVENOUS; SUBCUTANEOUS at 13:27

## 2022-05-31 RX ADMIN — KETOROLAC TROMETHAMINE 30 MG: 30 INJECTION, SOLUTION INTRAMUSCULAR; INTRAVENOUS at 23:46

## 2022-05-31 RX ADMIN — ONDANSETRON 4 MG: 2 INJECTION INTRAMUSCULAR; INTRAVENOUS at 10:50

## 2022-05-31 RX ADMIN — HYDROMORPHONE HYDROCHLORIDE 0.25 MG: 1 INJECTION, SOLUTION INTRAMUSCULAR; INTRAVENOUS; SUBCUTANEOUS at 14:16

## 2022-05-31 RX ADMIN — MEPERIDINE HYDROCHLORIDE 12.5 MG: 50 INJECTION, SOLUTION INTRAMUSCULAR; INTRAVENOUS; SUBCUTANEOUS at 13:33

## 2022-05-31 RX ADMIN — Medication 5 MG: at 22:53

## 2022-05-31 RX ADMIN — DEXMEDETOMIDINE HYDROCHLORIDE 8 MCG: 100 INJECTION, SOLUTION INTRAVENOUS at 12:27

## 2022-05-31 RX ADMIN — KETOROLAC TROMETHAMINE 30 MG: 30 INJECTION, SOLUTION INTRAMUSCULAR; INTRAVENOUS at 16:08

## 2022-05-31 RX ADMIN — OXYCODONE 10 MG: 5 TABLET ORAL at 14:33

## 2022-05-31 RX ADMIN — CEFAZOLIN SODIUM 2000 MG: 10 INJECTION, POWDER, FOR SOLUTION INTRAVENOUS at 10:49

## 2022-05-31 RX ADMIN — HYDROMORPHONE HYDROCHLORIDE 0.5 MG: 1 INJECTION, SOLUTION INTRAMUSCULAR; INTRAVENOUS; SUBCUTANEOUS at 13:00

## 2022-05-31 RX ADMIN — HYDROMORPHONE HYDROCHLORIDE 2 MG: 2 INJECTION, SOLUTION INTRAMUSCULAR; INTRAVENOUS; SUBCUTANEOUS at 16:07

## 2022-05-31 RX ADMIN — FLUTICASONE PROPIONATE 2 SPRAY: 50 SPRAY, METERED NASAL at 20:12

## 2022-05-31 RX ADMIN — ROCURONIUM BROMIDE 50 MG: 10 SOLUTION INTRAVENOUS at 10:40

## 2022-05-31 RX ADMIN — SUGAMMADEX 200 MG: 100 INJECTION, SOLUTION INTRAVENOUS at 12:36

## 2022-05-31 RX ADMIN — AMITRIPTYLINE HYDROCHLORIDE 50 MG: 25 TABLET, FILM COATED ORAL at 20:12

## 2022-05-31 RX ADMIN — PROPOFOL 200 MG: 10 INJECTION, EMULSION INTRAVENOUS at 10:39

## 2022-05-31 RX ADMIN — OXYCODONE 10 MG: 5 TABLET ORAL at 22:15

## 2022-05-31 RX ADMIN — SUGAMMADEX 200 MG: 100 INJECTION, SOLUTION INTRAVENOUS at 12:44

## 2022-05-31 RX ADMIN — MIDAZOLAM HYDROCHLORIDE 2 MG: 2 INJECTION, SOLUTION INTRAMUSCULAR; INTRAVENOUS at 10:34

## 2022-05-31 RX ADMIN — HYDROMORPHONE HYDROCHLORIDE 0.5 MG: 1 INJECTION, SOLUTION INTRAMUSCULAR; INTRAVENOUS; SUBCUTANEOUS at 14:06

## 2022-05-31 RX ADMIN — LIDOCAINE HYDROCHLORIDE 40 MG: 10 INJECTION, SOLUTION EPIDURAL; INFILTRATION; INTRACAUDAL; PERINEURAL at 10:39

## 2022-05-31 RX ADMIN — SODIUM CHLORIDE: 9 INJECTION, SOLUTION INTRAVENOUS at 18:36

## 2022-05-31 RX ADMIN — HYDROMORPHONE HYDROCHLORIDE 1 MG: 2 INJECTION, SOLUTION INTRAMUSCULAR; INTRAVENOUS; SUBCUTANEOUS at 20:33

## 2022-05-31 RX ADMIN — KETOROLAC TROMETHAMINE 30 MG: 30 INJECTION, SOLUTION INTRAMUSCULAR at 16:08

## 2022-05-31 RX ADMIN — FENTANYL CITRATE 100 MCG: 50 INJECTION INTRAMUSCULAR; INTRAVENOUS at 10:39

## 2022-05-31 RX ADMIN — ROCURONIUM BROMIDE 20 MG: 10 SOLUTION INTRAVENOUS at 11:32

## 2022-05-31 ASSESSMENT — PAIN SCALES - GENERAL
PAINLEVEL_OUTOF10: 10
PAINLEVEL_OUTOF10: 8
PAINLEVEL_OUTOF10: 10
PAINLEVEL_OUTOF10: 0

## 2022-05-31 ASSESSMENT — PAIN DESCRIPTION - LOCATION
LOCATION: ABDOMEN

## 2022-05-31 ASSESSMENT — LIFESTYLE VARIABLES: SMOKING_STATUS: 0

## 2022-05-31 ASSESSMENT — PAIN DESCRIPTION - ORIENTATION
ORIENTATION: OTHER (COMMENT)
ORIENTATION: MID
ORIENTATION: LEFT

## 2022-05-31 ASSESSMENT — PAIN DESCRIPTION - DESCRIPTORS
DESCRIPTORS: ACHING;BURNING
DESCRIPTORS: ACHING;BURNING;DISCOMFORT
DESCRIPTORS: SHARP
DESCRIPTORS: SPASM;ACHING
DESCRIPTORS: SHARP
DESCRIPTORS: ACHING;BURNING

## 2022-05-31 ASSESSMENT — ENCOUNTER SYMPTOMS: SHORTNESS OF BREATH: 0

## 2022-05-31 NOTE — ANESTHESIA PRE PROCEDURE
Department of Anesthesiology  Preprocedure Note       Name:  Ander Dia   Age:  64 y.o.  :  1965                                          MRN:  [id number]         Date:  2022      Surgeon: Ladi Lino):  Sara Manuel MD    Procedure: Procedure(s):  ROBOTIC INCISIONAL HERNIA REPAIR WITH MESH, POSSIBLE OPEN PROCEDURE    Medications prior to admission:   Prior to Admission medications    Medication Sig Start Date End Date Taking? Authorizing Provider   colestipol (COLESTID) 1 g tablet TAKE 1 TABLET BY MOUTH TWICE A DAY 22   Historical Provider, MD   amLODIPine (NORVASC) 5 MG tablet TAKE 1 TABLET BY MOUTH EVERY DAY 3/21/22   PARVEZ Boston CNP   fluticasone (FLONASE) 50 MCG/ACT nasal spray SHAKE LIQUID AND USE 2 SPRAYS IN EACH NOSTRIL DAILY 3/21/22   PARVEZ Boston CNP   lisinopril-hydroCHLOROthiazide (PRINZIDE;ZESTORETIC) 20-25 MG per tablet TAKE 1 TABLET BY MOUTH EVERY DAY 2/3/22   PARVEZ Boston CNP   vitamin D (ERGOCALCIFEROL) 1.25 MG (00928 UT) CAPS capsule Take 1 capsule by mouth once a week 21   PARVEZ Boston CNP   amitriptyline (ELAVIL) 50 MG tablet Take 1 tablet by mouth nightly 21  PARVEZ Boston CNP   pantoprazole (PROTONIX) 20 MG tablet Take 1 tablet by mouth 2 times daily (before meals) 21  PARVEZ Boston CNP       Current medications:    Current Facility-Administered Medications   Medication Dose Route Frequency Provider Last Rate Last Admin    ceFAZolin (ANCEF) 2000 mg in dextrose 5 % 100 mL IVPB  2,000 mg IntraVENous On Call to Matteo Bray MD           Allergies:     Allergies   Allergen Reactions    Penicillins     Prednisone Nausea And Vomiting       Problem List:    Patient Active Problem List   Diagnosis Code    Iron deficiency anemia due to chronic blood loss D50.0    Polyp of transverse colon K63.5    Colon tumor D49.0    Bile acid malabsorption syndrome K90.89    Iron deficiency anemia secondary to blood loss (chronic) D50.0    Anemia, chronic disease D63.8    Malignant neoplasm of ascending colon (HCC) C18.2    Colon cancer (HCC) C18.9    Carcinoma of ascending colon (HCC) C18.2    Moderate malnutrition (HCC) E44.0    Postprocedural intraabdominal abscess T81.43XA    Sebaceous cyst L72.3    Suspected sleep apnea R29.818    Hypertensive disorder I10    Bilateral nephrolithiasis N20.0    Polyp of descending colon K63.5    Colon, diverticulosis K57.30    Personal history of colon cancer, stage I Z85.038    Insomnia G47.00    History of colectomy Z90.49    Irritable bowel syndrome with diarrhea K58.0       Past Medical History:        Diagnosis Date    [facility] cancer ([facility] 04/2019    GERD (gastroesophageal reflux disease)     resolved    Kidney stones     Malignant neoplasm of ascending colon ([facility] 4/9/2019    Uncontrolled hypertension 7/19/2019       Past Surgical History:        Procedure Laterality Date    APPENDECTOMY      COLON SURGERY      I & D of colon abscess [post op    COLONOSCOPY N/A 4/4/2019    COLONOSCOPY WITH BIOPSY performed by Fab Negron MD at [facility] [address]  4/4/2019    COLONOSCOPY POLYPECTOMY SNARE/COLD BIOPSY performed by Fab Negron MD at [address] [facility] COLONOSCOPY N/A 6/12/2020    COLONOSCOPY POLYPECTOMY SNARE performed by Olga Romero MD at [address] Right 4/16/2019    ROBOTIC LAPAROSCOPIC RIGHT COLECTOMY CPT CODE - 32775 performed by Odette Stein MD at [address] N/A 4/4/2019    EGD performed by Fab Negron MD at [address] [place] [address] History:    Social History     Tobacco Use    Smoking status: Never Smoker    Smokeless tobacco: Never Used   Substance Use Topics    Alcohol use: Not Currently     Comment: occasional Counseling given: Not Answered      Vital Signs (Current):   Vitals:    05/24/22 1438 05/31/22 0935   BP:  (!) 131/94   Pulse:  80   Resp:  16   Temp:  97.2 °F (36.2 °C)   TempSrc:  Temporal   SpO2:  97%   Weight: 195 lb (88.5 kg) 200 lb 8 oz (90.9 kg)   Height: 5' 9\" (1.753 m)                                               BP Readings from Last 3 Encounters:   05/31/22 (!) 131/94   05/27/22 118/84   04/25/22 (!) 106/54       NPO Status: Time of last liquid consumption: 1800                        Time of last solid consumption: 1800                        Date of last liquid consumption: 05/30/22                        Date of last solid food consumption: 05/30/22    BMI:   Wt Readings from Last 3 Encounters:   05/31/22 200 lb 8 oz (90.9 kg)   05/27/22 201 lb (91.2 kg)   04/25/22 202 lb (91.6 kg)     Body mass index is 29.61 kg/m². CBC:   Lab Results   Component Value Date    WBC 8.6 05/27/2022    RBC 5.12 05/27/2022    HGB 15.5 05/27/2022    HCT 45.4 05/27/2022    MCV 88.6 05/27/2022    RDW 12.9 05/27/2022     05/27/2022       CMP:   Lab Results   Component Value Date     05/27/2022    K 4.1 05/27/2022    K 3.6 04/17/2019     05/27/2022    CO2 22 05/27/2022    BUN 10 05/27/2022    CREATININE 0.9 05/27/2022    GFRAA >60 05/27/2022    AGRATIO 2.2 05/27/2022    LABGLOM >60 05/27/2022    GLUCOSE 86 05/27/2022    PROT 7.1 05/27/2022    CALCIUM 9.5 05/27/2022    BILITOT 0.4 05/27/2022    ALKPHOS 138 05/27/2022    AST 29 05/27/2022    ALT 47 05/27/2022       POC Tests: No results for input(s): POCGLU, POCNA, POCK, POCCL, POCBUN, POCHEMO, POCHCT in the last 72 hours. Coags:   Lab Results   Component Value Date    PROTIME 15.3 04/27/2019    INR 1.34 04/27/2019    APTT 35.4 04/26/2019       HCG (If Applicable): No results found for: PREGTESTUR, PREGSERUM, HCG, HCGQUANT     ABGs: No results found for: PHART, PO2ART, PGQ4CTG, PUD3DTS, BEART, Q6FTKCLC     Type & Screen (If Applicable):   No results found for: Kevin Villa    Drug/Infectious Status (If Applicable):  No results found for: HIV, HEPCAB    COVID-19 Screening (If Applicable):   Lab Results   Component Value Date    COVID19 Not Detected 06/09/2020           Anesthesia Evaluation  Patient summary reviewed no history of anesthetic complications:   Airway: Mallampati: II  TM distance: >3 FB   Neck ROM: full  Mouth opening: > = 3 FB   Dental:          Pulmonary:Negative Pulmonary ROS breath sounds clear to auscultation      (-) COPD, asthma, shortness of breath, recent URI, sleep apnea and not a current smoker          Patient did not smoke on day of surgery. Cardiovascular:    (+) hypertension:,     (-) pacemaker, past MI, CAD, CABG/stent, dysrhythmias,  angina and  CHF    ECG reviewed  Rhythm: regular  Rate: normal           Beta Blocker:  Not on Beta Blocker         Neuro/Psych:   Negative Neuro/Psych ROS     (-) seizures, TIA, CVA and depression/anxiety            GI/Hepatic/Renal:   (+) GERD: well controlled, renal disease: kidney stones,      (-) liver disease, bowel prep and no morbid obesity       Endo/Other:    (+) blood dyscrasia (HX ANEMIA W/ COLON CA, RESOLVED): arthritis:., malignancy/cancer (COLON). (-) diabetes mellitus, hypothyroidism, hyperthyroidism               Abdominal:       Abdomen: soft. Vascular: negative vascular ROS. Other Findings:           Anesthesia Plan      general     ASA 3     (2 PIV / ? GARCIA)  Induction: intravenous. MIPS: Postoperative opioids intended and Prophylactic antiemetics administered. Anesthetic plan and risks discussed with patient. Plan discussed with CRNA. This pre-anesthesia assessment may be used as a history and physical.    DOS STAFF ADDENDUM:    Pt seen and examined, chart reviewed (including anesthesia, drug and allergy history). No interval changes to history and physical examination.   Anesthetic plan, risks, benefits, alternatives, and personnel involved discussed with patient. Patient verbalized an understanding and agrees to proceed.       Pam Baird MD  May 31, 2022  9:46 AM      Pam Baird MD   5/31/2022

## 2022-05-31 NOTE — PROGRESS NOTES
Abdominal binder applied. Ice to abdomen. States has pain with breathing in center of abdomen. Area rounded and tender. Pt states had rounded abdomen prior to surgery. Given Oxycodone 10 mg po. Taking po.

## 2022-05-31 NOTE — PROGRESS NOTES
Patient arrived in PACU at this time and placed on monitor. Report received from [id number] SRonald Dumont and Precious SHELTON. Will continue to monitor. Patient has oral airway in place with 6 L/min O2 via nasal cannula.

## 2022-05-31 NOTE — PROGRESS NOTES
Patient encouraged to breath through nose and not hold breath through c/o pain. Patient repositioned for increased comfort. Patient holding breath and very tense. Emotional support provided and instructions to relax and breath through his nose. Patient verbalized understanding.

## 2022-05-31 NOTE — PROGRESS NOTES
D: pt transferred to [facility], [facility], wife at bedside. Unable to tolerate any movement in bed, flinches when abdomen is touched, abdomen appears distended, no bowel sounds heard, incisions intact. DR Buster Fuentes called, awaiting for MD to bedside.

## 2022-05-31 NOTE — PROGRESS NOTES
Patient assessed as uncontrolled abdominal pain. Centrally located at site of hernia repair. Some minor ecchymosis but no hematoma. No erythema. Vital signs stable. At this point will obtain labs and admit for better pain control given size of hernia repair.     Isreal Zuluaga MD

## 2022-06-01 ENCOUNTER — APPOINTMENT (OUTPATIENT)
Dept: GENERAL RADIOLOGY | Age: 57
End: 2022-06-01
Attending: SURGERY
Payer: COMMERCIAL

## 2022-06-01 PROCEDURE — 51798 US URINE CAPACITY MEASURE: CPT

## 2022-06-01 PROCEDURE — 97116 GAIT TRAINING THERAPY: CPT

## 2022-06-01 PROCEDURE — 99024 POSTOP FOLLOW-UP VISIT: CPT | Performed by: SURGERY

## 2022-06-01 PROCEDURE — 71046 X-RAY EXAM CHEST 2 VIEWS: CPT

## 2022-06-01 PROCEDURE — 97162 PT EVAL MOD COMPLEX 30 MIN: CPT

## 2022-06-01 PROCEDURE — 97110 THERAPEUTIC EXERCISES: CPT

## 2022-06-01 PROCEDURE — 97535 SELF CARE MNGMENT TRAINING: CPT

## 2022-06-01 PROCEDURE — APPSS45 APP SPLIT SHARED TIME 31-45 MINUTES: Performed by: CLINICAL NURSE SPECIALIST

## 2022-06-01 PROCEDURE — 2580000003 HC RX 258: Performed by: SURGERY

## 2022-06-01 PROCEDURE — 6370000000 HC RX 637 (ALT 250 FOR IP): Performed by: SURGERY

## 2022-06-01 PROCEDURE — 96372 THER/PROPH/DIAG INJ SC/IM: CPT

## 2022-06-01 PROCEDURE — 6360000002 HC RX W HCPCS: Performed by: SURGERY

## 2022-06-01 PROCEDURE — 97165 OT EVAL LOW COMPLEX 30 MIN: CPT

## 2022-06-01 PROCEDURE — 51701 INSERT BLADDER CATHETER: CPT

## 2022-06-01 PROCEDURE — G0378 HOSPITAL OBSERVATION PER HR: HCPCS

## 2022-06-01 PROCEDURE — 96374 THER/PROPH/DIAG INJ IV PUSH: CPT

## 2022-06-01 PROCEDURE — 97530 THERAPEUTIC ACTIVITIES: CPT

## 2022-06-01 RX ADMIN — AMLODIPINE BESYLATE 5 MG: 5 TABLET ORAL at 08:29

## 2022-06-01 RX ADMIN — ENOXAPARIN SODIUM 40 MG: 100 INJECTION SUBCUTANEOUS at 08:31

## 2022-06-01 RX ADMIN — Medication 10 ML: at 08:28

## 2022-06-01 RX ADMIN — KETOROLAC TROMETHAMINE 30 MG: 30 INJECTION, SOLUTION INTRAMUSCULAR; INTRAVENOUS at 23:25

## 2022-06-01 RX ADMIN — Medication 5 MG: at 20:17

## 2022-06-01 RX ADMIN — OXYCODONE 10 MG: 5 TABLET ORAL at 20:17

## 2022-06-01 RX ADMIN — PANTOPRAZOLE SODIUM 20 MG: 20 TABLET, DELAYED RELEASE ORAL at 06:49

## 2022-06-01 RX ADMIN — PANTOPRAZOLE SODIUM 20 MG: 20 TABLET, DELAYED RELEASE ORAL at 15:07

## 2022-06-01 RX ADMIN — FLUTICASONE PROPIONATE 2 SPRAY: 50 SPRAY, METERED NASAL at 08:31

## 2022-06-01 RX ADMIN — KETOROLAC TROMETHAMINE 30 MG: 30 INJECTION, SOLUTION INTRAMUSCULAR; INTRAVENOUS at 17:59

## 2022-06-01 RX ADMIN — HYDROCHLOROTHIAZIDE 25 MG: 25 TABLET ORAL at 08:29

## 2022-06-01 RX ADMIN — OXYCODONE 10 MG: 5 TABLET ORAL at 14:28

## 2022-06-01 RX ADMIN — AMITRIPTYLINE HYDROCHLORIDE 50 MG: 25 TABLET, FILM COATED ORAL at 20:17

## 2022-06-01 RX ADMIN — KETOROLAC TROMETHAMINE 30 MG: 30 INJECTION, SOLUTION INTRAMUSCULAR; INTRAVENOUS at 06:49

## 2022-06-01 RX ADMIN — KETOROLAC TROMETHAMINE 30 MG: 30 INJECTION, SOLUTION INTRAMUSCULAR; INTRAVENOUS at 11:36

## 2022-06-01 RX ADMIN — CHOLESTYRAMINE 4 G: 4 POWDER, FOR SUSPENSION ORAL at 08:29

## 2022-06-01 RX ADMIN — LISINOPRIL 20 MG: 20 TABLET ORAL at 08:29

## 2022-06-01 RX ADMIN — SODIUM CHLORIDE: 9 INJECTION, SOLUTION INTRAVENOUS at 13:18

## 2022-06-01 RX ADMIN — OXYCODONE 10 MG: 5 TABLET ORAL at 03:40

## 2022-06-01 ASSESSMENT — PAIN DESCRIPTION - LOCATION
LOCATION: ABDOMEN

## 2022-06-01 ASSESSMENT — PAIN SCALES - GENERAL
PAINLEVEL_OUTOF10: 10
PAINLEVEL_OUTOF10: 3
PAINLEVEL_OUTOF10: 10
PAINLEVEL_OUTOF10: 10
PAINLEVEL_OUTOF10: 4
PAINLEVEL_OUTOF10: 6
PAINLEVEL_OUTOF10: 7

## 2022-06-01 ASSESSMENT — PAIN DESCRIPTION - DESCRIPTORS
DESCRIPTORS: ACHING;BURNING
DESCRIPTORS: ACHING;BURNING

## 2022-06-01 NOTE — PROGRESS NOTES
[facility] GENERAL SURGERY    Surgery Progress Note           POD # 1    PATIENT NAME: Jay Briggs     TODAY'S DATE: 6/1/2022    INTERVAL HISTORY:    Pt reports having abdominal pain overnight and not able to sleep at all, states his pain is somewhat improved this morning and he was able to get out of bed to ambulate to the bathroom. He also reports having nausea last night, but this morning wants to try and eat some breakfast. Does admit to feeling distended. OBJECTIVE:   VITALS:  BP (!) 156/79   Pulse (!) 104   Temp 98.2 °F (36.8 °C) (Oral)   Resp 18   Ht 5' 9\" (1.753 m)   Wt 200 lb 8 oz (90.9 kg)   SpO2 94%   BMI 29.61 kg/m²     INTAKE/OUTPUT:    I/O last 3 completed shifts: In: 900 [I.V.:900]  Out: -   I/O this shift:  In: 7159 [P.O.:120; I.V.:1386]  Out: -               CONSTITUTIONAL:  awake and alert  LUNGS:  no crackles or wheezing  ABDOMEN:   hypoactive bowel sounds,  distended, tenderness noted diffusely   INCISION: clean, dry    Data:  CBC: Recent Labs     05/31/22  1616   WBC 11.4*   HGB 14.9   HCT 43.9        BMP:    Recent Labs     05/31/22  0953 05/31/22  1616   * 133*   K 4.0 4.3    100   CO2 23 22   BUN 14 12   CREATININE 0.8* 0.8*   GLUCOSE 110* 117*         ASSESSMENT AND PLAN:  64 y.o. male status post  Robotic incisional Hernia Repair with Mesh     Continue with current pain control. Monitor for return of nausea this AM.     Urinary retention: pt going to try and urinate, may need bladder scan, possible straight cath. Possibly home later today, or tomorrow pending improvement. Electronically signed by PARVEZ Bonilla CNP     Patient seen and agree with above and more than half of the total time was spent by me on the encounter. Pain improved but still not enough for discharge. Straight cath for urinary retention  Increase ambulation.     Sumit Lemus MD

## 2022-06-01 NOTE — PROGRESS NOTES
Occupational Therapy  Facility/Department: [facility] C3 TELE/MED SURG/ONC  Occupational Therapy Initial Assessment/Treatment/Discharge      Name: Kyle Weldon  : 1965  MRN: [id number]  Date of Service: 2022    Discharge Recommendations:  24 hour supervision or assist  OT Equipment Recommendations  Equipment Needed: No       Patient Diagnosis(es): The encounter diagnosis was Postoperative pain. Past Medical History:  has a past medical history of Colon cancer ([facility] GERD (gastroesophageal reflux disease), Kidney stones, Malignant neoplasm of ascending colon ([facility] [address] and Uncontrolled hypertension. Past Surgical History:  has a past surgical history that includes Upper gastrointestinal endoscopy (N/A, 2019); hemicolectomy (Right, 2019); Appendectomy; Colonoscopy (N/A, 2019); Colonoscopy (2019); Colon surgery; Colonoscopy (N/A, 2020); and hernia repair (N/A, 2022). Assessment   Performance deficits / Impairments: Decreased functional mobility ; Decreased ADL status  Assessment: Pt 63 yo male functioning with deficits in the areas listed above following post Robotic incisional Hernia Repair with Mesh. Pt reports IND PLOF and lives at home with family. Pt is currently limited due to pain and SOB. Pt o2 ranged from 87-91% on 3 L O2 with education on pursed lip breathing and task modification. Pt verbalized understanding and able to complete bathroom mobility and toilet transfers at a S level for line management. Pt educated on task modification for dressing and showering. No further OT needs at this time. Please defer to PT to continue to address functional mobility. Please reorder if there is a change in status.   Prognosis: Good  Decision Making: Low Complexity  No Skilled OT: Safe to return home  REQUIRES OT FOLLOW-UP: No  Activity Tolerance  Activity Tolerance: Patient Tolerated treatment well  Activity Tolerance Comments: 127/82         Disease Specific Education: Pt educated on importance of OOB mobility, prevention of complications of bedrest, and general safety during hospitalization. Pt verbalized understanding      Plan   Plan  Times per Week: 1x only     Restrictions  Restrictions/Precautions  Restrictions/Precautions: Up as Tolerated  Position Activity Restriction  Other position/activity restrictions: 3 O 2    Subjective   General  Chart Reviewed: Yes  Patient assessed for rehabilitation services?: Yes  Family / Caregiver Present: No  Diagnosis: post  Robotic incisional Hernia Repair with Mesh  Subjective  Subjective: Pt agreeable to therapy  General Comment  Comments: RN approved therapy     Social/Functional History  Social/Functional History  Lives With: Spouse (will be able to provide 24 hour assist)  Type of Home: House  Home Layout: Two level,1/2 bath on main level  Home Access: Stairs to enter without rails  Entrance Stairs - Number of Steps: 2  Bathroom Shower/Tub: Tub/Shower unit,Walk-in shower  Bathroom Toilet: Standard  ADL Assistance: Independent  Homemaking Assistance: Independent  Homemaking Responsibilities: Yes  Ambulation Assistance: Independent  Transfer Assistance: Independent  Active : Yes  Occupation: Full time employment  Type of Occupation:   Leisure & Hobbies: go to the park       Objective   Heart Rate: (!) 104  Heart Rate Source: Monitor  BP: (!) 156/79  BP Location: Left upper arm  Patient Position: Semi fowlers  MAP (Calculated): 104.67  Resp: 18  SpO2: 94 %  O2 Device: None (Room air)  Vision Exceptions: Wears glasses at all times  Hearing: Within functional limits       Observation/Palpation  Posture: Good  Safety Devices  Type of Devices: All fall risk precautions in place;Call light within reach; Chair alarm in place;Gait belt;Left in chair;Nurse notified  Bed Mobility Training  Bed Mobility Training: Yes  Overall Level of Assistance: Stand-by assistance (HOB elevated, log roll technique, cues for positioning)  Supine to Sit: Stand-by assistance  Sit to Supine: Other (comment) (up in chair at end of session)  Transfer Training  Transfer Training: Yes  Overall Level of Assistance: Stand-by assistance  Interventions: Safety awareness training  Sit to Stand: Stand-by assistance  Stand to Sit: Stand-by assistance  Bed to Chair: Stand-by assistance (RW)  Toilet Transfers  Toilet - Technique: Ambulating  Equipment Used: Grab bars  Toilet Transfer: Stand by assistance  Toilet Transfers Comments: RW- progressed to S on 2nd attempt  AROM: Within functional limits  PROM: Within functional limits  Strength: Within functional limits  Coordination: Within functional limits  ADL  Feeding: Independent  Grooming: Stand by assistance  Toileting: Stand by assistance (SBA for standing, S for sitting)  Additional Comments: Pt educated on increased safety awareness and task modification        Bed mobility  Supine to Sit: Stand by assistance  Sit to Supine: Unable to assess (up in chair at end of session)  Transfers  Sit to stand: Stand by assistance  Stand to sit: Stand by assistance  Transfer Comments: cues for abdominal bracing and task modification                        Education Given To: Patient; Family  Education Provided: Role of Therapy;Precautions;IADL Safety;Transfer Training;Energy Conservation; Family Education; ADL Adaptive Strategies  Education Method: Demonstration;Verbal  Education Outcome: Verbalized understanding  LUE AROM (degrees)  LUE AROM : WFL  RUE AROM (degrees)  RUE AROM : [facility]-PAC Score        AM-[facility] Inpatient Daily Activity Raw Score: 21 (06/01/22 1432)  AM-PAC Inpatient ADL T-Scale Score : 44.27 (06/01/22 1432)  ADL Inpatient CMS 0-100% Score: 32.79 (06/01/22 1432)  ADL Inpatient CMS G-Code Modifier : CJ (06/01/22 1432)    Goals  Short Term Goals  Time Frame for Short term goals: 1x only  Short Term Goal 1: Pt will complete toilet transfer with S.  GOAL MET       Therapy Time   Individual Concurrent Group Co-treatment   Time In 1326         Time Out 1405         Minutes 39         Timed Code Treatment Minutes: 29 Minutes (10 minutes for eval)       Mellissa Garcia OTR/L    If pt is unable to be seen after this session, please let this note serve as discharge summary. Please see case management note for discharge disposition. Thank you.

## 2022-06-01 NOTE — CARE COORDINATION
CASE MANAGEMENT INITIAL ASSESSMENT    Reviewed chart and completed assessment with patient: Yes   Family present: None   Explained Case Management role/services. Primary contact information: Yuliya Reynolds spouse     Health Care Decision Maker :   Primary Decision Maker: Selena Garcia - Spouse - [phone]          Can this person be reached and be able to respond quickly, such as within a few minutes or hours? Yes      Admit date/status:05/31/2022 OBS  Diagnosis: HX of incisional hernia repair   Is this a Readmission?:  No      Insurance:[employer] [place] [facility]    Precert required for SNF: Yes       3 night stay required: No    Living arrangements, Adls, care needs, prior to admission: Home with spouse and family, IPTA 2 MAGED and 2nd floor bed/bath- able  To stay on 1st floor if needed. Durable Medical Equipment at home:  NA    Services in the home and/or outpatient, prior to admission: NA    Current PCP:Mikaela Peralta CNP            Medications: Prescription coverage? Yes Will pt require financial assistance with medications No     Transportation needs: Spouse will be in later this date, can transport home     Barriers to discharge: pain/deit tolerance     Plan/comments: Patient from home with family, Lucy Aceves. Patient reports ambulated this date denies needs from CM. BLAYNE Campbell       ECOC on chart for MD signature

## 2022-06-01 NOTE — PROGRESS NOTES
Pt with ongoing c/o abdominal pain throughout shift, 10/10 with movement. Repositioning unsuccessful in reducing pain. PRNs provided per [facility]. Pt reported feeling like he needed to urinate, attempted urinal use unsuccessfully, ambulated to toilet, no spontaneous urination. Several brief near-emetic spasms during ambulation. Pt continues to guard abdomen during movement, spasm and coughing episodes. Movement stable with x1 assist.    RN will continue to monitor.

## 2022-06-01 NOTE — PROGRESS NOTES
Shift assessment completed and charted. VSS. IS given. A&Ox4. 3laps surgical glue,JOO. Pt placed on 3L NC, stat in the 70s on RA. Straight cathed earlier in shift with 950 cc out, see I&O charted. Bed alarm on. Able to sit up in chair for an hour. Bed locked and in lowest position. Call light within reach. Pt denies any other needs at this time. Will continue to monitor.

## 2022-06-01 NOTE — PLAN OF CARE
Please see PT evaluation dated 6/1/2022 with detailed POC and progression to Increase function towards baseline.

## 2022-06-02 ENCOUNTER — APPOINTMENT (OUTPATIENT)
Dept: GENERAL RADIOLOGY | Age: 57
End: 2022-06-02
Attending: SURGERY
Payer: COMMERCIAL

## 2022-06-02 PROBLEM — R09.02 POSTOPERATIVE HYPOXEMIA: Status: ACTIVE | Noted: 2022-06-02

## 2022-06-02 PROBLEM — R09.89 PULMONARY VENOUS CONGESTION: Status: ACTIVE | Noted: 2022-06-02

## 2022-06-02 PROBLEM — R79.89 ELEVATED TSH: Status: ACTIVE | Noted: 2022-06-02

## 2022-06-02 PROBLEM — E66.3 OVERWEIGHT (BMI 25.0-29.9): Status: ACTIVE | Noted: 2022-06-02

## 2022-06-02 PROBLEM — Z98.890 POSTOPERATIVE HYPOXEMIA: Status: ACTIVE | Noted: 2022-06-02

## 2022-06-02 PROBLEM — J98.11 ATELECTASIS: Status: ACTIVE | Noted: 2022-06-02

## 2022-06-02 LAB
ANION GAP SERPL CALCULATED.3IONS-SCNC: 10 MMOL/L (ref 3–16)
BASOPHILS ABSOLUTE: 0 K/UL (ref 0–0.2)
BASOPHILS RELATIVE PERCENT: 0.4 %
BUN BLDV-MCNC: 11 MG/DL (ref 7–20)
CALCIUM SERPL-MCNC: 8.2 MG/DL (ref 8.3–10.6)
CHLORIDE BLD-SCNC: 105 MMOL/L (ref 99–110)
CO2: 23 MMOL/L (ref 21–32)
CREAT SERPL-MCNC: 0.8 MG/DL (ref 0.9–1.3)
EOSINOPHILS ABSOLUTE: 0.3 K/UL (ref 0–0.6)
EOSINOPHILS RELATIVE PERCENT: 3 %
GFR AFRICAN AMERICAN: >60
GFR NON-AFRICAN AMERICAN: >60
GLUCOSE BLD-MCNC: 127 MG/DL (ref 70–99)
HCT VFR BLD CALC: 38 % (ref 40.5–52.5)
HEMOGLOBIN: 13.1 G/DL (ref 13.5–17.5)
LYMPHOCYTES ABSOLUTE: 0.7 K/UL (ref 1–5.1)
LYMPHOCYTES RELATIVE PERCENT: 7.6 %
MCH RBC QN AUTO: 30.6 PG (ref 26–34)
MCHC RBC AUTO-ENTMCNC: 34.5 G/DL (ref 31–36)
MCV RBC AUTO: 88.5 FL (ref 80–100)
MONOCYTES ABSOLUTE: 0.9 K/UL (ref 0–1.3)
MONOCYTES RELATIVE PERCENT: 9.7 %
NEUTROPHILS ABSOLUTE: 7.3 K/UL (ref 1.7–7.7)
NEUTROPHILS RELATIVE PERCENT: 79.3 %
PDW BLD-RTO: 12.9 % (ref 12.4–15.4)
PLATELET # BLD: 181 K/UL (ref 135–450)
PMV BLD AUTO: 8.3 FL (ref 5–10.5)
POTASSIUM SERPL-SCNC: 3.7 MMOL/L (ref 3.5–5.1)
PRO-BNP: 204 PG/ML (ref 0–124)
RBC # BLD: 4.29 M/UL (ref 4.2–5.9)
SODIUM BLD-SCNC: 138 MMOL/L (ref 136–145)
WBC # BLD: 9.2 K/UL (ref 4–11)

## 2022-06-02 PROCEDURE — 36415 COLL VENOUS BLD VENIPUNCTURE: CPT

## 2022-06-02 PROCEDURE — 6370000000 HC RX 637 (ALT 250 FOR IP): Performed by: SURGERY

## 2022-06-02 PROCEDURE — 80048 BASIC METABOLIC PNL TOTAL CA: CPT

## 2022-06-02 PROCEDURE — 74019 RADEX ABDOMEN 2 VIEWS: CPT

## 2022-06-02 PROCEDURE — 83880 ASSAY OF NATRIURETIC PEPTIDE: CPT

## 2022-06-02 PROCEDURE — 2700000000 HC OXYGEN THERAPY PER DAY

## 2022-06-02 PROCEDURE — 85025 COMPLETE CBC W/AUTO DIFF WBC: CPT

## 2022-06-02 PROCEDURE — 96372 THER/PROPH/DIAG INJ SC/IM: CPT

## 2022-06-02 PROCEDURE — 96375 TX/PRO/DX INJ NEW DRUG ADDON: CPT

## 2022-06-02 PROCEDURE — 94669 MECHANICAL CHEST WALL OSCILL: CPT

## 2022-06-02 PROCEDURE — 97116 GAIT TRAINING THERAPY: CPT

## 2022-06-02 PROCEDURE — 96376 TX/PRO/DX INJ SAME DRUG ADON: CPT

## 2022-06-02 PROCEDURE — 6360000002 HC RX W HCPCS: Performed by: SURGERY

## 2022-06-02 PROCEDURE — 6360000002 HC RX W HCPCS: Performed by: INTERNAL MEDICINE

## 2022-06-02 PROCEDURE — 97530 THERAPEUTIC ACTIVITIES: CPT

## 2022-06-02 PROCEDURE — 2580000003 HC RX 258: Performed by: SURGERY

## 2022-06-02 PROCEDURE — G0378 HOSPITAL OBSERVATION PER HR: HCPCS

## 2022-06-02 PROCEDURE — 99222 1ST HOSP IP/OBS MODERATE 55: CPT | Performed by: INTERNAL MEDICINE

## 2022-06-02 PROCEDURE — 94761 N-INVAS EAR/PLS OXIMETRY MLT: CPT

## 2022-06-02 PROCEDURE — 99024 POSTOP FOLLOW-UP VISIT: CPT | Performed by: SURGERY

## 2022-06-02 RX ORDER — DOCUSATE SODIUM 100 MG/1
100 CAPSULE, LIQUID FILLED ORAL DAILY
Status: DISCONTINUED | OUTPATIENT
Start: 2022-06-02 | End: 2022-06-03 | Stop reason: HOSPADM

## 2022-06-02 RX ORDER — FUROSEMIDE 10 MG/ML
40 INJECTION INTRAMUSCULAR; INTRAVENOUS ONCE
Status: COMPLETED | OUTPATIENT
Start: 2022-06-02 | End: 2022-06-02

## 2022-06-02 RX ORDER — POLYETHYLENE GLYCOL 3350 17 G/17G
17 POWDER, FOR SOLUTION ORAL ONCE
Status: COMPLETED | OUTPATIENT
Start: 2022-06-02 | End: 2022-06-02

## 2022-06-02 RX ADMIN — Medication 10 ML: at 19:43

## 2022-06-02 RX ADMIN — AMITRIPTYLINE HYDROCHLORIDE 50 MG: 25 TABLET, FILM COATED ORAL at 21:22

## 2022-06-02 RX ADMIN — FUROSEMIDE 40 MG: 10 INJECTION, SOLUTION INTRAMUSCULAR; INTRAVENOUS at 13:00

## 2022-06-02 RX ADMIN — KETOROLAC TROMETHAMINE 30 MG: 30 INJECTION, SOLUTION INTRAMUSCULAR; INTRAVENOUS at 12:59

## 2022-06-02 RX ADMIN — KETOROLAC TROMETHAMINE 30 MG: 30 INJECTION, SOLUTION INTRAMUSCULAR; INTRAVENOUS at 19:43

## 2022-06-02 RX ADMIN — HYDROCHLOROTHIAZIDE 25 MG: 25 TABLET ORAL at 10:16

## 2022-06-02 RX ADMIN — PANTOPRAZOLE SODIUM 20 MG: 20 TABLET, DELAYED RELEASE ORAL at 16:14

## 2022-06-02 RX ADMIN — PANTOPRAZOLE SODIUM 20 MG: 20 TABLET, DELAYED RELEASE ORAL at 06:11

## 2022-06-02 RX ADMIN — POLYETHYLENE GLYCOL 3350 17 G: 17 POWDER, FOR SOLUTION ORAL at 10:15

## 2022-06-02 RX ADMIN — ENOXAPARIN SODIUM 40 MG: 100 INJECTION SUBCUTANEOUS at 10:16

## 2022-06-02 RX ADMIN — AMLODIPINE BESYLATE 5 MG: 5 TABLET ORAL at 10:15

## 2022-06-02 RX ADMIN — Medication 5 MG: at 21:22

## 2022-06-02 RX ADMIN — DOCUSATE SODIUM 100 MG: 100 CAPSULE, LIQUID FILLED ORAL at 10:15

## 2022-06-02 RX ADMIN — OXYCODONE 10 MG: 5 TABLET ORAL at 03:53

## 2022-06-02 RX ADMIN — LISINOPRIL 20 MG: 20 TABLET ORAL at 10:15

## 2022-06-02 RX ADMIN — OXYCODONE 10 MG: 5 TABLET ORAL at 19:03

## 2022-06-02 RX ADMIN — KETOROLAC TROMETHAMINE 30 MG: 30 INJECTION, SOLUTION INTRAMUSCULAR; INTRAVENOUS at 06:11

## 2022-06-02 RX ADMIN — OXYCODONE 10 MG: 5 TABLET ORAL at 10:15

## 2022-06-02 ASSESSMENT — PAIN SCALES - GENERAL
PAINLEVEL_OUTOF10: 7
PAINLEVEL_OUTOF10: 10
PAINLEVEL_OUTOF10: 6
PAINLEVEL_OUTOF10: 6
PAINLEVEL_OUTOF10: 7
PAINLEVEL_OUTOF10: 7

## 2022-06-02 ASSESSMENT — PAIN DESCRIPTION - LOCATION
LOCATION: ABDOMEN

## 2022-06-02 NOTE — CONSULTS
INPATIENT PULMONARY CRITICAL CARE CONSULT NOTE      Chief Complaint/Referring Provider:  Patient is being seen at the request of Dr. Marilyn Akhtar for a consultation for postop hypoxia     Presenting HPI: Patient came to the hospital for elective surgery for enlarging incisional hernia    Patient underwent herniorrhaphy using robotic surgery 2 days back with estimated blood loss of less than 50 mL, patient had increased abdominal discomfort after the surgery along with that patient was not able to take a deep breath as per the patient, patient also became more hypoxemic after the surgery, patient has been having increasing hypoxemia and patient states that yesterday evening he brought up a glob of respiratory secretions which were mucoid in nature, patient does not have any shortness of breath while lying down and his oxygen levels are maintained when he is lying down but patient states that as soon as he starts walking his saturation drops in low 80s, patient states that he has a chest pressure but no chest pain, patient does not have any obvious wheezing, patient did not have any significant fever or chills, patient states that prior to the surgery he did not have any shortness of breath, patient was doing a lot of activities without any problems, patient still has some abdominal pain, patient does not have any significant heartburn or any nausea at this time, patient is afebrile and hemodynamically maintained, patient has sinus rhythm on the monitor, patient states that he has been given Wellbutrin to help him with sleeping, patient states that he does get up sometimes around 3:00 in the morning time, patient states that he has been told by his spouse that only when he is very tired that he is towards, patient does feel sleepy in the daytime at times especially when he wakes up early in the morning time but not on regular basis, patient does not give any overt history of sleep fragmentation at this time, patient does not have any significant dysuria or hematuria, patient does not have any increasing leg edema, patient's urine output as documented the epic was borderline, patient has a cumulative fluid balance of +2.3 L, patient was alert and oriented, no other pertinent review of system of concern       Patient Active Problem List    Diagnosis Date Noted    Postoperative hypoxemia 06/02/2022    Pulmonary venous congestion 06/02/2022    Atelectasis 06/02/2022    Overweight (BMI 25.0-29.9) 06/02/2022    Elevated TSH 06/02/2022    History of incisional hernia repair 05/31/2022    Incisional hernia, without obstruction or gangrene     Insomnia 05/27/2022    History of colectomy 05/19/2022    Irritable bowel syndrome with diarrhea 05/19/2022    Polyp of descending colon     Colon, diverticulosis     Personal history of colon cancer, stage I     Suspected sleep apnea 07/19/2019    Hypertensive disorder 07/19/2019    Bilateral nephrolithiasis 07/19/2019    Sebaceous cyst     Postprocedural intraabdominal abscess 04/26/2019    Moderate malnutrition ([address] 04/18/2019    Colon cancer ([address] 04/16/2019    Carcinoma of ascending colon (HCC)     Malignant neoplasm of ascending colon ([address] 04/09/2019    Bile acid malabsorption syndrome 04/04/2019    Iron deficiency anemia secondary to blood loss (chronic) 04/04/2019    Anemia, chronic disease 04/04/2019    Iron deficiency anemia due to chronic blood loss     Polyp of transverse colon     Colon tumor        Past Medical History:   Diagnosis Date    Colon cancer ([address] 04/2019    GERD (gastroesophageal reflux disease)     resolved    Kidney stones     Malignant neoplasm of ascending colon ([address] 4/9/2019    Uncontrolled hypertension 7/19/2019        Past Surgical History:   Procedure Laterality Date    APPENDECTOMY      COLON SURGERY      I & D of colon abscess [post op    COLONOSCOPY N/A 4/4/2019    COLONOSCOPY WITH BIOPSY performed by Kathleen Brown MD P[facility] at [facility] [address]  4/4/2019    COLONOSCOPY POLYPECTOMY SNARE/COLD BIOPSY performed by Delbert Owen MD at [address] COLONOSCOPY N/A 6/12/2020    COLONOSCOPY POLYPECTOMY SNARE performed by Johana Lala MD at [address] Right 4/16/2019    ROBOTIC LAPAROSCOPIC RIGHT COLECTOMY CPT CODE - 53639 performed by Tomy Chisholm MD at [place] [address] N/A 5/31/2022    ROBOTIC [address] performed by Tomy Chisholm MD at [facility] ENDOSCOPY N/A 4/4/2019    EGD performed by Delbert Owen MD at [address] History   Problem Relation Age of Onset    Other Mother         diverticulitis    Diabetes Father     Cancer Father         bladder  w mets        Social History     Tobacco Use    Smoking status: Never Smoker    Smokeless tobacco: Never Used   Substance Use Topics    Alcohol use: Not Currently     Comment: occasional        Allergies   Allergen Reactions    Penicillins     Prednisone Nausea And Vomiting               Physical Exam:  Blood pressure 121/82, pulse 97, temperature 98.3 °F (36.8 °C), temperature source Oral, resp. rate 18, height 5' 9\" (1.753 m), weight 200 lb 8 oz (90.9 kg), SpO2 90 %.'   Constitutional:  No acute distress. HENT:  Oropharynx is clear and moist. No thyromegaly. Eyes:  Conjunctivae are normal. Pupils equal, round, and reactive to light. No scleral icterus. Neck: . No tracheal deviation present. No obvious thyroid mass. Cardiovascular: Normal rate, regular rhythm, normal heart sounds. No right ventricular heave. No lower extremity edema. Pulmonary/Chest: No wheezes. Minimal basilar rales. Chest wall is not dull to percussion. No accessory muscle usage or stridor. Decreased breath sound density  Abdominal: Soft. Bowel sounds present.   Slightly distended with slight tenderness with no rebound tenderness  Musculoskeletal: No cyanosis. No clubbing. No obvious joint deformity. Lymphadenopathy: No cervical or supraclavicular adenopathy. Skin: Skin is warm and dry. No rash or nodules on the exposed extremities. Psychiatric: Normal mood and affect. Behavior is normal.  No anxiety. Neurologic: Alert, awake and oriented. PERRL. Speech fluent        Results:  CBC:   Recent Labs     05/31/22  1616 06/02/22  0530   WBC 11.4* 9.2   HGB 14.9 13.1*   HCT 43.9 38.0*   MCV 89.5 88.5    181     BMP:   Recent Labs     05/31/22  0953 05/31/22  1616 06/02/22  0530   * 133* 138   K 4.0 4.3 3.7    100 105   CO2 23 22 23   BUN 14 12 11   CREATININE 0.8* 0.8* 0.8*       Imaging:  I have reviewed radiology images personally. XR CHEST (2 VW)   Final Result   Stable mild cardiomegaly with mild central pulmonary congestion. Mild bibasilar atelectasis or early infiltrates and a questionable small left   pleural effusion. XR CHEST (2 VW)    Result Date: 6/2/2022  EXAMINATION: TWO XRAY VIEWS OF THE CHEST 6/1/2022 6:05 pm COMPARISON: None. HISTORY: ORDERING SYSTEM PROVIDED HISTORY: hypoxia TECHNOLOGIST PROVIDED HISTORY: Reason for exam:->hypoxia Reason for exam:->s/p robotic incisional hernia repair Reason for Exam: hypoxia, hernia repair FINDINGS: The heart is mildly enlarged. The pulmonary vessels are engorged and ill-defined centrally. The lungs are hypoinflated. There are hazy bibasilar opacities. There is a small left pleural effusion. Stable mild cardiomegaly with mild central pulmonary congestion. Mild bibasilar atelectasis or early infiltrates and a questionable small left pleural effusion. Results for [place]arette [place] (MRN [id number]) as of 6/2/2022 15:37   Ref. Range 5/27/2022 12:00   TSH Latest Ref Range: 0.27 - 4.20 uIU/mL 6.23 (H)   T4 Free Latest Ref Range: 0.9 - 1.8 ng/dL 1.0     Results for Margarette [place] (MRN [id number]) as of 6/2/2022 15:37   Ref. Range 7/5/2019 12:00 7/19/2019 13:31 8/27/2021 16:38 5/27/2022 12:00   TSH Latest Ref Range: 0.27 - 4.20 uIU/mL 4.35 (H) 4.54 (H) 5.19 (H) 6.23 (H)       Echocardiogram:None in Epic   PFT: None in Epic         Assessment:  Principal Problem:    History of incisional hernia repair  Active Problems:    Incisional hernia, without obstruction or gangrene    Postoperative hypoxemia    Pulmonary venous congestion    Atelectasis    Overweight (BMI 25.0-29. 9)    Elevated TSH  Resolved Problems:    * No resolved hospital problems.  *          Plan:   · Oxygen supplementation to keep saturation between 90-94% only  · Please titrate oxygen as per the above parameters  · Pulmonary toilet  · Patient is already doing incentive spirometry  · Acapella ordered  · Patient to be given 1 dose of IV Lasix  · Strict input output charting  · Monitor input output and BMP  · Correct electrolytes and whenever necessary basis  · Consider echocardiogram if deemed appropriate  · BNP ordered  · Patient has persistently elevated TSH levels and patient may require low-dose of Synthroid-to be decided upon by patient's primary team and PCP  · No need for any antibiotics or steroids from pulm standpoint of view  · No need for any bronchodilators for now  · Patient was told about the pathophysiology and sequelae of YOLIE  · If patient has any symptoms as discussed in the future patient can discuss with PCP about a home sleep study  · Analgesia as per surgery  · Monitor for any hypoventilation and hypercarbia  · PUD and DVT prophylaxis as per primary team            Electronically signed by:  Dejan Hagen MD    6/2/2022    3:39 PM.

## 2022-06-02 NOTE — CONSULTS
Consult placed   On floor seeing patient  Who:Dr. Tiny Tuttle  CZVF:8/7/0837,  Time:11:51 AM        Electronically signed by Vivien Edwards on 6/2/2022 at 11:51 AM

## 2022-06-02 NOTE — PROGRESS NOTES
Message sent to on call surgeon:  Pt with worsening abd pain \"10/10 and feels like pressure\". He would have discharged today from s\p Robotic incisional Hernia Repair with Mesh. He stayed d\t oxygen requirement. Dr. Marlin Marion said he had mild ileus. He does appear moderatly distended. + flatus, no BM yet (had miralax and colace this AM). Waiting response.       Update @ 2016:  New order for 2v AXR

## 2022-06-02 NOTE — CARE COORDINATION
Hospital day 2 changed from OBS to inpatient re hernia repair followed by General surgery and Pulmonology. Patient from home with spouse IPTA. Therapy recs for 24 hr and RW. Patient on 90% on room air, recently removed from 2L. Patient with no dx for 02 needs. BLAYNE Reese

## 2022-06-02 NOTE — PROGRESS NOTES
Physical Therapy  Facility/Department: [place] [address] TELE/MED SURG/ONC  Daily Treatment Note  NAME: Verito Tee  : 1965  MRN: [id number]    Date of Service: 2022    Discharge Recommendations:  24 hour supervision or assist   PT Equipment Recommendations  Equipment Needed: Yes  Mobility Devices: [place] Se: Rolling  Other: Pt may benefit from RW. Pt states he owns a cane and thinks that will be sufficient as pt mosly using 1 UE on RW as walks. Patient Diagnosis(es): The encounter diagnosis was Postoperative pain. Assessment    Assessment: Patient seen for gait and stairs training. Patient cleared by RN for therapy participation this date. Patient agreeable to therapy. Patient completed transfers with SBA and ambulates 200 ft with SBA with 1 UE on RW. Pt takes intermittent standing rest breaks d/t pain and SOB. VSS throughout session. Pt climbs 4 stairs with 1 HR mod I. P.T .will continue to follow throughout LOS. Recommending DC to home with 24/ supervision. Activity Tolerance: Patient limited by pain; Patient limited by fatigue;Patient limited by endurance  Equipment Needed: Yes  Mobility Devices: Walker  Other: Pt may benefit from RW. Pt states he owns a cane and thinks that will be sufficient as pt mosly using 1 UE on RW as walks. Plan    Plan  Plan: 3-5 times per week  Plan weeks: thru 2022  Current Treatment Recommendations: Balance training;Functional mobility training;Transfer training;Stair training;Gait training; Endurance training; Safety education & training;Patient/Caregiver education & training;Home exercise program;Therapeutic activities; Equipment evaluation, education, & procurement; Neuromuscular re-education     Restrictions  Restrictions/Precautions  Restrictions/Precautions: Up as Tolerated  Position Activity Restriction  Other position/activity restrictions: Patient has abdominal binder in room.  Notify provider for pulse less than 50 or greater than 120, respiratory rate less than 12 or greater than 25, temperature greater than 101.3 F (38.5 C), urinary output less than 120 mL in four hours, systolic BP less than 90 or greater than 242, diastolic BP less than 50 or greater than 100. Subjective    Subjective  Subjective: pt in bed, agreeable to therapy  Pain: Pt indicating significant pain in LLQ, does not formally rate  Orientation  Overall Orientation Status: Within Functional Limits  Cognition  Overall Cognitive Status: WFL     Objective   Vitals 94%, 96 bpm, 140/91     Bed Mobility Training  Supine to Sit: Minimum assistance (HOB elevated per pt request, assist at trunk d/t pain)  Sit to Supine: Modified independent  Transfer Training  Transfer Training: Yes  Sit to Stand: Stand-by assistance  Stand to Sit: Stand-by assistance  Gait Training  Gait Training: Yes  Gait  Overall Level of Assistance: Stand-by assistance  Speed/Catia: Slow  Distance (ft):  (200 ft)  Assistive Device: Walker, rolling;Gait belt  Rail Use: Right  Stairs - Level of Assistance: Modified independent  Number of Stairs Trained: 4         [facility] 6 Clicks Inpatient Mobility:  AM-PAC Mobility Inpatient   How much difficulty turning over in bed?: None  How much difficulty sitting down on / standing up from a chair with arms?: A Little  How much difficulty moving from lying on back to sitting on side of bed?: A Little  How much help from another person moving to and from a bed to a chair?: A Little  How much help from another person needed to walk in hospital room?: A Little  How much help from another person for climbing 3-5 steps with a railing?: None  AM-PAC Inpatient Mobility Raw Score : 20  AM-PAC Inpatient T-Scale Score : 47.67  Mobility Inpatient CMS 0-100% Score: 35.83  Mobility Inpatient CMS G-Code Modifier : CJ         Safety Devices  Type of Devices: All fall risk precautions in place;Call light within reach;Gait belt;Nurse notified; Left in bed  Restraints  Restraints Initially in Place: No Goals  Short Term Goals  Time Frame for Short term goals: 6/4/2022 unless noted potherwise  Short term goal 1: Due date 6/1/2022 Pt demo 10 reps of deep breathing with IS and verbalizes understanding for completing 10x each waking hour and LE seated therex to be completed daily. 6/1/2022 GOAL MET Due date 6/1/2022 Pt demo 10 reps of deep breathing with IS and verbalizes understanding for completing 10x each waking hour and LE seated therex to be completed daily. Short term goal 2: Pt demo sup<>Sidelying<>sit indep. Short term goal 3: Pt demo indep sit<>Stand with no AD or LRAD  Short term goal 4: Pt demo walking with no AD to LRAD indep with L/R turns 150 feet (household distances)  Short term goal 5: Pt demo up and down 2 steps without rail CGA to indep for safe entry into home. Additional Goals?: No  Long Term Goals  Time Frame for Long term goals : see above  Patient Goals   Patient goals : \"I am having a hard time taking a deep breath- it hurts. \"    Education  Patient Education  Education Given To: Patient  Education Provided: Role of Therapy;Plan of Care;Home Exercise Program  Education Method: Verbal;Teach Back  Barriers to Learning: Other (Comment)  Education Outcome: Verbalized understanding;Demonstrated understanding;Continued education needed    Therapy Time   Individual Concurrent Group Co-treatment   Time In 1111         Time Out 1145         Minutes 34         Timed Code Treatment Minutes: 34 Minutes     If pt is unable to be seen after this session, please let this note serve as discharge summary. Please see case management note for discharge disposition. Thank you.     Mychal Boston, PT

## 2022-06-02 NOTE — PROGRESS NOTES
Pt. Resting in bed. Alert/oriented. Vitals and assessment stable as charted. O2 90% RA at rest.  Ambulated 10 feet; became short of breath, tachypneic and desaturated to 84%. Placed back on [facility] and is now 92-94% at rest.  Placed pt up in chair. Reviewed incentive spirometry with patient. Education on reason for use. Instructed patient how to use incentive spirometer. Pt demonstrated understanding of use. Encouraged frequent use of I\S while patient is awake. CO abd pain 7/10; administered oxycodone 10 mg PO as per prn order. Tolerating regular diet. Passing small amounts flatus; no BM yet. Feels distended\bloated with intermittent nausea. Call light in reach. Will continue to monitor.

## 2022-06-03 VITALS
TEMPERATURE: 98.3 F | HEART RATE: 86 BPM | BODY MASS INDEX: 29.7 KG/M2 | HEIGHT: 69 IN | RESPIRATION RATE: 20 BRPM | WEIGHT: 200.5 LBS | SYSTOLIC BLOOD PRESSURE: 117 MMHG | OXYGEN SATURATION: 95 % | DIASTOLIC BLOOD PRESSURE: 79 MMHG

## 2022-06-03 PROCEDURE — 2580000003 HC RX 258: Performed by: SURGERY

## 2022-06-03 PROCEDURE — 96376 TX/PRO/DX INJ SAME DRUG ADON: CPT

## 2022-06-03 PROCEDURE — G0378 HOSPITAL OBSERVATION PER HR: HCPCS

## 2022-06-03 PROCEDURE — 99217 PR OBSERVATION CARE DISCHARGE MANAGEMENT: CPT | Performed by: SURGERY

## 2022-06-03 PROCEDURE — 2700000000 HC OXYGEN THERAPY PER DAY

## 2022-06-03 PROCEDURE — 94761 N-INVAS EAR/PLS OXIMETRY MLT: CPT

## 2022-06-03 PROCEDURE — 99232 SBSQ HOSP IP/OBS MODERATE 35: CPT | Performed by: INTERNAL MEDICINE

## 2022-06-03 PROCEDURE — 96372 THER/PROPH/DIAG INJ SC/IM: CPT

## 2022-06-03 PROCEDURE — 6360000002 HC RX W HCPCS: Performed by: SURGERY

## 2022-06-03 PROCEDURE — 6370000000 HC RX 637 (ALT 250 FOR IP): Performed by: SURGERY

## 2022-06-03 RX ADMIN — Medication 10 ML: at 08:41

## 2022-06-03 RX ADMIN — HYDROCHLOROTHIAZIDE 25 MG: 25 TABLET ORAL at 08:40

## 2022-06-03 RX ADMIN — KETOROLAC TROMETHAMINE 30 MG: 30 INJECTION, SOLUTION INTRAMUSCULAR; INTRAVENOUS at 01:17

## 2022-06-03 RX ADMIN — DOCUSATE SODIUM 100 MG: 100 CAPSULE, LIQUID FILLED ORAL at 08:40

## 2022-06-03 RX ADMIN — ENOXAPARIN SODIUM 40 MG: 100 INJECTION SUBCUTANEOUS at 08:40

## 2022-06-03 RX ADMIN — AMLODIPINE BESYLATE 5 MG: 5 TABLET ORAL at 08:40

## 2022-06-03 RX ADMIN — LISINOPRIL 20 MG: 20 TABLET ORAL at 08:40

## 2022-06-03 RX ADMIN — KETOROLAC TROMETHAMINE 30 MG: 30 INJECTION, SOLUTION INTRAMUSCULAR; INTRAVENOUS at 06:29

## 2022-06-03 RX ADMIN — OXYCODONE 10 MG: 5 TABLET ORAL at 14:24

## 2022-06-03 RX ADMIN — PANTOPRAZOLE SODIUM 20 MG: 20 TABLET, DELAYED RELEASE ORAL at 14:24

## 2022-06-03 RX ADMIN — PANTOPRAZOLE SODIUM 20 MG: 20 TABLET, DELAYED RELEASE ORAL at 06:29

## 2022-06-03 ASSESSMENT — PAIN SCALES - GENERAL
PAINLEVEL_OUTOF10: 3
PAINLEVEL_OUTOF10: 3
PAINLEVEL_OUTOF10: 8
PAINLEVEL_OUTOF10: 1
PAINLEVEL_OUTOF10: 0

## 2022-06-03 ASSESSMENT — PAIN DESCRIPTION - ORIENTATION: ORIENTATION: LOWER

## 2022-06-03 NOTE — PROGRESS NOTES
[facility] GENERAL SURGERY    Surgery Progress Note           POD # 3    PATIENT NAME: Rosales Xavier     TODAY'S DATE: 6/3/2022    INTERVAL HISTORY:    Pt  Doing well. IMproving abdominal bloating/pain. Still no flatus/BM but taking small amounts of PO well. Still on NC O2     OBJECTIVE:   VITALS:  /76   Pulse 97   Temp 98.5 °F (36.9 °C) (Oral)   Resp 18   Ht 5' 9\" (1.753 m)   Wt 200 lb 8 oz (90.9 kg)   SpO2 94%   BMI 29.61 kg/m²     INTAKE/OUTPUT:    I/O last 3 completed shifts: In: 1 [P.O.:960; I.V.:10]  Out: 725 [Urine:725]  No intake/output data recorded. CONSTITUTIONAL:  awake and alert  LUNGS:     ABDOMEN:    , soft, non-distended, non-tender   INCISION: clean, dry, no drainage, healing    Data:  CBC: Recent Labs     05/31/22  1616 06/02/22  0530   WBC 11.4* 9.2   HGB 14.9 13.1*   HCT 43.9 38.0*    181     BMP:    Recent Labs     05/31/22  0953 05/31/22  1616 06/02/22  0530   * 133* 138   K 4.0 4.3 3.7    100 105   CO2 23 22 23   BUN 14 12 11   CREATININE 0.8* 0.8* 0.8*   GLUCOSE 110* 117* 127*     Hepatic: No results for input(s): AST, ALT, ALB, BILITOT, ALKPHOS in the last 72 hours. Mag:    No results for input(s): MG in the last 72 hours. Phos:   No results for input(s): PHOS in the last 72 hours. INR: No results for input(s): INR in the last 72 hours.       Radiology Review:       ASSESSMENT AND PLAN:  64 y.o. male status post Robotic incisional Hernia Repair with Mesh, with postop pulmonary insufficiency   - diet as tolerated   - up OOB   - OK for d/c home pending final recs from Pulmonary         Electronically signed by Agnes Nielsen MD

## 2022-06-03 NOTE — PROGRESS NOTES
INPATIENT PULMONARY CRITICAL CARE PROGRESS NOTE      Reason for visit    Postop hypoxia     SUBJECTIVE: Patient when seen this morning was feeling better and patient did not have any significant increasing cough, patient is able to bring up thick mucoid secretions with medications and the Acapella along with incentive spirometry, patient was not having any chest pain or palpitations, patient is able to take a deep breath now, patient was not hypoxemic and patient was saturating 94 to 95% on room air, patient was able to ambulate in the room and go to the bathroom without any hypoxemia, patient does not have any increasing abdominal discomfort at this time, patient has been tolerating oral diet, patient has had improving urine output, patient has a cumulative fluid balance of +2.2 L, patient's glycemic control was acceptable, patient does not have any other pertinent review of system of concern             Physical Exam:  Blood pressure 117/79, pulse 86, temperature 98.3 °F (36.8 °C), temperature source Oral, resp. rate 20, height 5' 9\" (1.753 m), weight 200 lb 8 oz (90.9 kg), SpO2 95 %.'     Constitutional:  No acute distress. HENT:  Oropharynx is clear and moist. No thyromegaly. Eyes:  Conjunctivae are normal. Pupils equal, round, and reactive to light. No scleral icterus. Neck: . No tracheal deviation present. No obvious thyroid mass. Cardiovascular: Normal rate, regular rhythm, normal heart sounds. No right ventricular heave. No lower extremity edema. Pulmonary/Chest: No wheezes. No significant rales. Chest wall is not dull to percussion. No accessory muscle usage or stridor. Decreased breath sound density  Abdominal: Soft. Bowel sounds present. Slightly distended with slight tenderness with no rebound tenderness  Musculoskeletal: No cyanosis. No clubbing. No obvious joint deformity. Lymphadenopathy: No cervical or supraclavicular adenopathy. Skin: Skin is warm and dry.  No rash or nodules on the exposed extremities. Psychiatric: Normal mood and affect. Behavior is normal.  No anxiety. Neurologic: Alert, awake and oriented. PERRL. Speech fluent          Results:  CBC:   Recent Labs     05/31/22  1616 06/02/22  0530   WBC 11.4* 9.2   HGB 14.9 13.1*   HCT 43.9 38.0*   MCV 89.5 88.5    181     BMP:   Recent Labs     05/31/22  0953 05/31/22  1616 06/02/22  0530   * 133* 138   K 4.0 4.3 3.7    100 105   CO2 23 22 23   BUN 14 12 11   CREATININE 0.8* 0.8* 0.8*       Imaging:  I have reviewed radiology images personally. XR CHEST (2 VW)   Final Result   Stable mild cardiomegaly with mild central pulmonary congestion. Mild bibasilar atelectasis or early infiltrates and a questionable small left   pleural effusion. XR ABDOMEN (2 VIEWS)    (Results Pending)     XR CHEST (2 VW)    Result Date: 6/2/2022  EXAMINATION: TWO XRAY VIEWS OF THE CHEST 6/1/2022 6:05 pm COMPARISON: None. HISTORY: ORDERING SYSTEM PROVIDED HISTORY: hypoxia TECHNOLOGIST PROVIDED HISTORY: Reason for exam:->hypoxia Reason for exam:->s/p robotic incisional hernia repair Reason for Exam: hypoxia, hernia repair FINDINGS: The heart is mildly enlarged. The pulmonary vessels are engorged and ill-defined centrally. The lungs are hypoinflated. There are hazy bibasilar opacities. There is a small left pleural effusion. Stable mild cardiomegaly with mild central pulmonary congestion. Mild bibasilar atelectasis or early infiltrates and a questionable small left pleural effusion. Assessment:  Principal Problem:    History of incisional hernia repair  Active Problems:    Incisional hernia, without obstruction or gangrene    Postoperative hypoxemia    Pulmonary venous congestion    Atelectasis    Overweight (BMI 25.0-29. 9)    Elevated TSH    Postoperative pain  Resolved Problems:    * No resolved hospital problems.  *          Plan:   · Oxygen supplementation, if required to keep saturation between 90-94% only  · Patient was on room air oxygen when seen  · Please evaluate the patient for home oxygen  · Pulmonary toilet  · Patient is already doing incentive spirometry  · Acapella to continue  · Status post 1 dose of IV Lasix  · Strict input output charting  · Monitor input output and BMP  · Correct electrolytes and whenever necessary basis  · Consider echocardiogram if deemed appropriate as an outpatient  · BNP is slightly elevated  · Patient has persistently elevated TSH levels and patient may require low-dose of Synthroid-to be decided upon by patient's primary team and PCP  · No need for any antibiotics or steroids from pulm standpoint of view  · No need for any bronchodilators for now  · Patient was told about the pathophysiology and sequelae of YOLIE  · If patient has any symptoms as discussed in the future patient can discuss with PCP about a home sleep study  · Analgesia as per surgery  · Monitor for any hypoventilation and hypercarbia  · PUD and DVT prophylaxis as per primary team    Discharge planning    No other recommendation from pulmonary/critical care standpoint review- will sign off    Case discussed with patient and nursing            Electronically signed by:  Yousuf Emerson MD    6/3/2022    9:36 AM.

## 2022-06-03 NOTE — PROGRESS NOTES
Pt. Resting in bed. Alert/oriented. Vitals and assessment stable as charted. O@ 94% on 2.5 L at rest now. Placed on Room air. Denies any pain/nausea or any other discomfort at present time. + flatus; no BM yet. Call light in reach. Will continue to monitor.

## 2022-06-03 NOTE — PROGRESS NOTES
Pt is alert and oriented. VSS. 2.5 L NC. SpO2 94%. Pt c/o 3/10 pain. Pt given scheduled Toradol per MAR. Pt has 3 lap incisions CDI, JOO. Pt BS active x4. Pt stated he passed gas earlier in the day on 6/2. Pt has not had a BM thus far since surgery. Pt abdomen slightly distended. Shift assessment completed and documented. Call light within reach. Bed side table within reach. Wheels locked. Bed in lowest position. Pt instructed to call out for assistance. Pt expressesed understanding & calls out appropritately. All care per orders.  Electronically signed by Enedina Garcia RN on 6/3/2022 at 1:27 AM

## 2022-06-03 NOTE — DISCHARGE SUMMARY
Surgery Discharge Summary    Patient Identification  Nisha Mohr is a 64 y.o. male. :  1965  Admit Date:  2022    Discharge date:  6/3/2022                                  Disposition: home    Discharge Diagnoses:   Principal Problem:    History of incisional hernia repair  Active Problems:    Incisional hernia, without obstruction or gangrene    Postoperative hypoxemia    Pulmonary venous congestion    Atelectasis    Overweight (BMI 25.0-29. 9)    Elevated TSH    Postoperative pain  Resolved Problems:    * No resolved hospital problems. *      Discharge condition: good    Discharge Medications:     Current Discharge Medication List      CONTINUE these medications which have NOT CHANGED    Details   colestipol (COLESTID) 1 g tablet TAKE 1 TABLET BY MOUTH TWICE A DAY      amLODIPine (NORVASC) 5 MG tablet TAKE 1 TABLET BY MOUTH EVERY DAY  Qty: 90 tablet, Refills: 0      fluticasone (FLONASE) 50 MCG/ACT nasal spray SHAKE LIQUID AND USE 2 SPRAYS IN EACH NOSTRIL DAILY  Qty: 16 g, Refills: 2      lisinopril-hydroCHLOROthiazide (PRINZIDE;ZESTORETIC) 20-25 MG per tablet TAKE 1 TABLET BY MOUTH EVERY DAY  Qty: 90 tablet, Refills: 0      vitamin D (ERGOCALCIFEROL) 1.25 MG (49967 UT) CAPS capsule Take 1 capsule by mouth once a week  Qty: 12 capsule, Refills: 1      amitriptyline (ELAVIL) 50 MG tablet Take 1 tablet by mouth nightly  Qty: 30 tablet, Refills: 11    Associated Diagnoses: Gastroesophageal reflux disease with esophagitis without hemorrhage      pantoprazole (PROTONIX) 20 MG tablet Take 1 tablet by mouth 2 times daily (before meals)  Qty: 180 tablet, Refills: 1    Associated Diagnoses: Gastroesophageal reflux disease with esophagitis without hemorrhage                Current Discharge Medication List      START taking these medications    Details   oxyCODONE (ROXICODONE) 5 MG immediate release tablet Take 1-2 tablets by mouth every 6 hours as needed for Pain for up to 5 days.  Intended supply: 5 days. Take lowest dose possible to manage pain  Qty: 20 tablet, Refills: 0    Comments: Reduce doses taken as pain becomes manageable. May need 1-2 tabs for postop pain. Associated Diagnoses: Postoperative pain      ondansetron (ZOFRAN) 4 MG tablet Take 1 tablet by mouth 3 times daily as needed for Nausea or Vomiting  Qty: 10 tablet, Refills: 0               Most Recent Labs:    CBC:   Recent Labs     05/31/22  1616 06/02/22  0530   WBC 11.4* 9.2   HGB 14.9 13.1*   HCT 43.9 38.0*    181     BMP:    Recent Labs     05/31/22  1616 06/02/22  0530   * 138   K 4.3 3.7    105   CO2 22 23   BUN 12 11   CREATININE 0.8* 0.8*   GLUCOSE 117* 127*     Hepatic: No results for input(s): AST, ALT, ALB, BILITOT, ALKPHOS in the last 72 hours. PT/INR:  No results for input(s): INR in the last 72 hours. Consults: pulmonary/intensive care    Surgery: Robotic incisional Hernia Repair with Mesh     Patient Instructions: Activity: no heavy lifting, pushing, pulling for 2 weeks, no driving for 1 weeks or while on analgesics  Diet: As tolerated  Follow-up with Dr. Tiffanie Lizarraga in 2 weeks. The patient and/or family/patient representatives, were provided education regarding discharge instructions, ongoing treatment and follow-up. Details of information given to the patient may be found in the discharge instructions located in the EMR. HPI and Hospital Course: The pt presented to the hospital for the above elective hernia repair. He was admitted for pain and nausea control. Pulmonary was consulted due to pt having difficulty maintaining appropriate oxygen saturation. He was discharged to home once improved. For a detailed hospital course, please refer to the daily progress notes.       Joseline Hill, APRN - [address]  General Surgery

## 2022-06-03 NOTE — FLOWSHEET NOTE
06/03/22 1536   Encounter Summary   Encounter Overview/Reason  Rituals, Rites and Sacraments  (Pt received Communion)   Service Provided For: Patient and family together   Referral/Consult From: [facility]   Support System Spouse   Last Encounter  06/03/22   Complexity of Encounter Low   Begin Time 1400   End Time  1415   Total Time Calculated 15 min   Spiritual/Emotional needs   Type Spiritual Support   Rituals, Rites and Sacraments   Type Oriental orthodox Communion  (Pt and spouse received Comm)

## 2022-06-03 NOTE — PROGRESS NOTES
Discharge: Pt discharged to home as per order. IV removed. Prescriptions given & instructions reviewed. Pt verbalized understanding. Denied questions. Taken to s\o vehicle via w\c in stable & ambulatory condition.

## 2022-06-03 NOTE — PROGRESS NOTES
[facility] SURGERY - [facility]    SURGERY ADDENDUM  6/3/2022 10:37 AM     Pulmonary input reviewed - ok to discharge today. Discussed with RN, pt now ambulating without O2 and doing well. AXR with Non-obstructive gas pattern, and abd pain improved. Pt anxious to go home. D/c today.        Ric Black, PARVEZ - CNP

## 2022-06-03 NOTE — CARE COORDINATION
CASE MANAGEMENT DISCHARGE SUMMARY    Discharge to: Home with sposue    IMM given: (date) NA OBS    New Durable Medical Equipment ordered/agency: Rec RW reports ambulating in room without feeling better declining     Transportation: Via private car    Confirmed discharge plan with: Patient     RN, name: BLAYNE Osborn

## 2022-06-09 ENCOUNTER — TELEPHONE (OUTPATIENT)
Dept: SURGERY | Age: 57
End: 2022-06-09

## 2022-06-09 DIAGNOSIS — G89.18 POST-OP PAIN: Primary | ICD-10-CM

## 2022-06-09 DIAGNOSIS — K21.00 GASTROESOPHAGEAL REFLUX DISEASE WITH ESOPHAGITIS WITHOUT HEMORRHAGE: ICD-10-CM

## 2022-06-09 RX ORDER — OXYCODONE HYDROCHLORIDE AND ACETAMINOPHEN 5; 325 MG/1; MG/1
1 TABLET ORAL EVERY 6 HOURS PRN
Qty: 16 TABLET | Refills: 0 | Status: SHIPPED | OUTPATIENT
Start: 2022-06-09 | End: 2022-06-13

## 2022-06-09 RX ORDER — PANTOPRAZOLE SODIUM 20 MG/1
20 TABLET, DELAYED RELEASE ORAL
Qty: 180 TABLET | Refills: 1 | Status: SHIPPED | OUTPATIENT
Start: 2022-06-09 | End: 2022-10-28

## 2022-06-09 NOTE — TELEPHONE ENCOUNTER
Pt had hernia repair surgery with Dr. Sravan Collins 05.31.22. Pt is asking for a medication refill. Pt can be reached back at [phone].

## 2022-06-09 NOTE — TELEPHONE ENCOUNTER
Patient is requesting a refill on Percocet. Dr. Tiffanie Lizarraga will send in the script, patient notified.

## 2022-06-14 ENCOUNTER — NURSE TRIAGE (OUTPATIENT)
Dept: OTHER | Facility: CLINIC | Age: 57
End: 2022-06-14

## 2022-06-14 NOTE — TELEPHONE ENCOUNTER
Received call from Beck Liu at [facility] with [employer]. Subjective: Caller states \"I was in the hospital June 3rd for hernia surgery. I am also having numbness from my knee to my hips.  This is not new, they were aware before I left the hospital\"     No triage needed

## 2022-06-16 ENCOUNTER — OFFICE VISIT (OUTPATIENT)
Dept: SURGERY | Age: 57
End: 2022-06-16

## 2022-06-16 VITALS
WEIGHT: 200 LBS | BODY MASS INDEX: 29.62 KG/M2 | HEIGHT: 69 IN | SYSTOLIC BLOOD PRESSURE: 121 MMHG | DIASTOLIC BLOOD PRESSURE: 71 MMHG | HEART RATE: 102 BPM

## 2022-06-16 DIAGNOSIS — K43.2 INCISIONAL HERNIA, WITHOUT OBSTRUCTION OR GANGRENE: Primary | ICD-10-CM

## 2022-06-16 PROCEDURE — 99024 POSTOP FOLLOW-UP VISIT: CPT | Performed by: SURGERY

## 2022-06-20 ENCOUNTER — OFFICE VISIT (OUTPATIENT)
Dept: FAMILY MEDICINE CLINIC | Age: 57
End: 2022-06-20
Payer: COMMERCIAL

## 2022-06-20 VITALS
SYSTOLIC BLOOD PRESSURE: 120 MMHG | OXYGEN SATURATION: 98 % | WEIGHT: 198 LBS | HEART RATE: 72 BPM | BODY MASS INDEX: 29.24 KG/M2 | RESPIRATION RATE: 18 BRPM | DIASTOLIC BLOOD PRESSURE: 82 MMHG

## 2022-06-20 DIAGNOSIS — R20.2 LEG PARESTHESIA: ICD-10-CM

## 2022-06-20 DIAGNOSIS — E78.00 ELEVATED CHOLESTEROL: ICD-10-CM

## 2022-06-20 DIAGNOSIS — R06.02 SHORTNESS OF BREATH: ICD-10-CM

## 2022-06-20 DIAGNOSIS — R79.89 ELEVATED BRAIN NATRIURETIC PEPTIDE (BNP) LEVEL: Primary | ICD-10-CM

## 2022-06-20 PROCEDURE — 99214 OFFICE O/P EST MOD 30 MIN: CPT | Performed by: NURSE PRACTITIONER

## 2022-06-20 RX ORDER — AMLODIPINE BESYLATE 5 MG/1
TABLET ORAL
Qty: 90 TABLET | Refills: 0 | Status: SHIPPED | OUTPATIENT
Start: 2022-06-20 | End: 2022-08-02

## 2022-06-20 RX ORDER — ONDANSETRON 4 MG/1
4 TABLET, FILM COATED ORAL 3 TIMES DAILY PRN
Qty: 10 TABLET | Refills: 0 | Status: SHIPPED | OUTPATIENT
Start: 2022-06-20

## 2022-06-20 ASSESSMENT — ENCOUNTER SYMPTOMS: RESPIRATORY NEGATIVE: 1

## 2022-06-20 NOTE — PROGRESS NOTES
6/20/2022    This is a 64 y.o. male   Chief Complaint   Patient presents with    Post-Op Check     had hernia surgery and had a few questions. wants to discuss latest test results and discuss how to strenthen his lungs. Also states the Surgeon told him his heart is enlarged. wants to make sure everything is okay    Numbness     on outside of bilateral thighs   . Yelena Celaya is seen today for follow-up after hernia surgery he states that after his surgery he was in the hospital for 2 additional days due to retaining water, shortness of breath and hypoxia. He states his oxygen would be in the 90s at rest and dropped into the 70s with activity. They gave him Lasix. Chest x-ray showed a mildly enlarged heart. A, on review of records this is stable for the patient. He did have an elevated BNP. Since being home he is not having any shortness of breath. No cough. No nocturnal dyspnea. No orthopnea. No lower leg swelling. He states that he is just concerned that this should be something he should be watching out for the future. He also knows that his elevation in cholesterol should be monitored. He is trying to watch his diet. He was also started on Colestid which he believes will also lower his cholesterol. Another concern is bilateral leg paresthesias. The numbness occurs primarily in the lateral thighs from hip to knee. It is described as a decreased sensation or an asleep feeling. No reported pain. No weakness. He states the numbness feels more superficial than deep. The numbness started immediately after his hernia surgery. He does feel that it is slowly improving.        Patient Active Problem List   Diagnosis    Iron deficiency anemia due to chronic blood loss    Polyp of transverse colon    Colon tumor    Bile acid malabsorption syndrome    Iron deficiency anemia secondary to blood loss (chronic)    Anemia, chronic disease    Malignant neoplasm of ascending colon (HCC)    [facility]    [facility]cinoma of ascending [facility]    Moderate malnutrition ([facility]    Postprocedural intraabdominal abscess    Sebaceous cyst    Suspected sleep apnea    Hypertensive disorder    Bilateral nephrolithiasis    Polyp of descending colon    Colon, diverticulosis    Personal history of colon cancer, stage I    Insomnia    History of colectomy    Irritable bowel syndrome with diarrhea    Incisional hernia, without obstruction or gangrene    History of incisional hernia repair    Postoperative hypoxemia    Pulmonary venous congestion    Atelectasis    Overweight (BMI 25.0-29. 9)    Elevated TSH    Postoperative pain       Current Outpatient Medications   Medication Sig Dispense Refill    amLODIPine (NORVASC) 5 MG tablet TAKE 1 TABLET BY MOUTH EVERY DAY 90 tablet 0    pantoprazole (PROTONIX) 20 MG tablet Take 1 tablet by mouth 2 times daily (before meals) 180 tablet 1    ondansetron (ZOFRAN) 4 MG tablet Take 1 tablet by mouth 3 times daily as needed for Nausea or Vomiting 10 tablet 0    colestipol (COLESTID) 1 g tablet TAKE 1 TABLET BY MOUTH TWICE A DAY      fluticasone (FLONASE) 50 MCG/ACT nasal spray SHAKE LIQUID AND USE 2 SPRAYS IN EACH NOSTRIL DAILY 16 g 2    lisinopril-hydroCHLOROthiazide (PRINZIDE;ZESTORETIC) 20-25 MG per tablet TAKE 1 TABLET BY MOUTH EVERY DAY 90 tablet 0    vitamin D (ERGOCALCIFEROL) 1.25 MG (04347 UT) CAPS capsule Take 1 capsule by mouth once a week 12 capsule 1    amitriptyline (ELAVIL) 50 MG tablet Take 1 tablet by mouth nightly 30 tablet 11     No current facility-administered medications for this visit.        Allergies   Allergen Reactions    Penicillins     Prednisone Nausea And Vomiting       Resp 18   Wt 198 lb (89.8 kg)   BMI 29.24 kg/m²     Social History     Tobacco Use    Smoking status: Never Smoker    Smokeless tobacco: Never Used   Substance Use Topics    Alcohol use: Not Currently     Comment: occasional       Review of Systems Constitutional: Negative. Respiratory: Negative. Cardiovascular: Negative. Musculoskeletal: Negative. Neurological: Positive for numbness. Negative for tremors and headaches. Psychiatric/Behavioral: The patient is nervous/anxious. Physical Exam  Vitals and nursing note reviewed. Constitutional:       General: He is not in acute distress. Appearance: He is well-developed and normal weight. He is not diaphoretic. HENT:      Head: Normocephalic and atraumatic. Right Ear: External ear normal.      Left Ear: External ear normal.      Nose: Nose normal.      Mouth/Throat:      Pharynx: No oropharyngeal exudate. Eyes:      General:         Right eye: No discharge. Left eye: No discharge. Conjunctiva/sclera: Conjunctivae normal.   Cardiovascular:      Rate and Rhythm: Normal rate and regular rhythm. Heart sounds: Normal heart sounds. No murmur heard. No friction rub. No gallop. Pulmonary:      Effort: Pulmonary effort is normal. No respiratory distress. Breath sounds: Normal breath sounds. No wheezing or rales. Abdominal:      General: Bowel sounds are normal. There is no distension. Palpations: Abdomen is soft. Tenderness: There is no abdominal tenderness. Comments: seroma   Musculoskeletal:         General: No tenderness. Normal range of motion. Cervical back: Normal range of motion and neck supple. Legs:    Lymphadenopathy:      Cervical: No cervical adenopathy. Skin:     General: Skin is warm and dry. Coloration: Skin is not pale. Findings: No erythema or rash. Neurological:      Mental Status: He is alert and oriented to person, place, and time. Motor: No abnormal muscle tone. Coordination: Coordination normal.      Deep Tendon Reflexes:      Reflex Scores:       Patellar reflexes are 2+ on the right side and 2+ on the left side.   Psychiatric:         Behavior: Behavior normal.         Thought Content: Thought content normal.         Judgment: Judgment normal.         Diagnosis       ICD-10-CM    1. Elevated brain natriuretic peptide (BNP) level  R79.89 XR CHEST STANDARD (2 VW)     Basic Metabolic Panel     Brain Natriuretic Peptide   2. Shortness of breath  R06.02 XR CHEST STANDARD (2 VW)   3. Elevated cholesterol  E78.00    4. Leg paresthesia  R20.2         Plan    Reassurance  We will recheck chest x-ray and BNP/labs in 2 weeks for resolution  May benefit from sleep study for possible sleep apnea  Leg paresthesias appear to be superficial, already resolving per patient, may benefit from a B complex vitamin and discussed that it may take several weeks for this to resolve completely  Iris elevated cholesterol, wishes to continue Colestid at this time and recheck labs in 3 months    Orders Placed This Encounter   Procedures    XR CHEST STANDARD (2 VW)     Standing Status:   Future     Standing Expiration Date:   6/20/2023     Order Specific Question:   Reason for exam:     Answer:   1 month follow up for post surgery hypoxia with atecletisis    Basic Metabolic Panel     Standing Status:   Future     Standing Expiration Date:   7/20/2022    Brain Natriuretic Peptide     Standing Status:   Future     Standing Expiration Date:   8/20/2022       Orders Placed This Encounter   Medications    ondansetron (ZOFRAN) 4 MG tablet     Sig: Take 1 tablet by mouth 3 times daily as needed for Nausea or Vomiting     Dispense:  10 tablet     Refill:  0       Patient Education:  Plan  Return in about 3 months (around 9/20/2022) for Cholesterol recheck, numbness follow-up with primary care.

## 2022-06-20 NOTE — TELEPHONE ENCOUNTER
[facility] OUT OF OFFICE    1/18/2022    Future Appointments   Date Time Provider Leonila Price   6/20/2022  4:20 PM PARVEZ Acevedo - CNP [facility] [place] [facility]

## 2022-06-20 NOTE — PROGRESS NOTES
HPI: Nursing notes reviewed. Patient is a 65 yo who underwent robotic incisional hernia repair with mesh at [facility] [place].   Reports much improved pain and no nausea. Energy is normal.  no diarrhea and no constipation. ROS:  10 point review of systems performed with pertinent positives in HPI    Phys:    Abd - soft, minimal tender, nondistended, no hernia, +seroma   Incisions - no erythema    Assesment: 65 yo s/p robotic incisional hernia repair with mesh    Plan: 1. Doing well postop with minimal pain and no nausea   2. No heavy lifting another two weeks   3. Monitor seroma -aspirate if not recessing in several weeks   4.   Call with concerns

## 2022-06-21 DIAGNOSIS — E78.2 MIXED HYPERLIPIDEMIA: Primary | ICD-10-CM

## 2022-07-01 ENCOUNTER — HOSPITAL ENCOUNTER (OUTPATIENT)
Dept: GENERAL RADIOLOGY | Age: 57
Discharge: HOME | End: 2022-07-01
Payer: COMMERCIAL

## 2022-07-01 DIAGNOSIS — R79.89 ELEVATED BRAIN NATRIURETIC PEPTIDE (BNP) LEVEL: ICD-10-CM

## 2022-07-01 DIAGNOSIS — E78.2 MIXED HYPERLIPIDEMIA: ICD-10-CM

## 2022-07-01 DIAGNOSIS — R06.02 SHORTNESS OF BREATH: ICD-10-CM

## 2022-07-01 PROCEDURE — 71046 X-RAY EXAM CHEST 2 VIEWS: CPT

## 2022-07-02 LAB
ANION GAP SERPL CALCULATED.3IONS-SCNC: 14 MMOL/L (ref 3–16)
BUN BLDV-MCNC: 15 MG/DL (ref 7–20)
CALCIUM SERPL-MCNC: 9.6 MG/DL (ref 8.3–10.6)
CHLORIDE BLD-SCNC: 98 MMOL/L (ref 99–110)
CHOLESTEROL, FASTING: 192 MG/DL (ref 0–199)
CO2: 24 MMOL/L (ref 21–32)
CREAT SERPL-MCNC: 0.9 MG/DL (ref 0.9–1.3)
GFR AFRICAN AMERICAN: >60
GFR NON-AFRICAN AMERICAN: >60
GLUCOSE BLD-MCNC: 83 MG/DL (ref 70–99)
HDLC SERPL-MCNC: 35 MG/DL (ref 40–60)
LDL CHOLESTEROL CALCULATED: 119 MG/DL
POTASSIUM SERPL-SCNC: 4.3 MMOL/L (ref 3.5–5.1)
PRO-BNP: 8 PG/ML (ref 0–124)
SODIUM BLD-SCNC: 136 MMOL/L (ref 136–145)
TRIGLYCERIDE, FASTING: 191 MG/DL (ref 0–150)
VLDLC SERPL CALC-MCNC: 38 MG/DL

## 2022-08-01 NOTE — TELEPHONE ENCOUNTER
LOV 1/18/2022  Future Appointments   Date Time Provider Leonila Price   9/23/2022  4:00 PM PARVEZ Jacobsen - CNP [facility] [place] [facility]

## 2022-08-02 RX ORDER — LISINOPRIL AND HYDROCHLOROTHIAZIDE 25; 20 MG/1; MG/1
TABLET ORAL
Qty: 90 TABLET | Refills: 1 | Status: SHIPPED | OUTPATIENT
Start: 2022-08-02 | End: 2022-09-23

## 2022-08-02 RX ORDER — AMLODIPINE BESYLATE 5 MG/1
TABLET ORAL
Qty: 90 TABLET | Refills: 0 | Status: SHIPPED | OUTPATIENT
Start: 2022-08-02

## 2022-08-02 NOTE — TELEPHONE ENCOUNTER
LOV 6/20/2022    Future Appointments   Date Time Provider Leonila Price   9/23/2022  4:00 PM PARVEZ Prieto - CNP [facility] [place] [facility]

## 2022-08-02 NOTE — TELEPHONE ENCOUNTER
Patient asking for a refill of     Patient needs a refill on   colestipol (COLESTID) 1 g tablet. They need a 90 day supply.         [id number]    6/20/2022   Future Appointments   Date Time Provider Leonila Price   9/23/2022  4:00 PM Thereasa Raoul, APRN - CNP [facility] [place] [facility]

## 2022-08-03 RX ORDER — MONTELUKAST SODIUM 4 MG/1
TABLET, CHEWABLE ORAL
Qty: 60 TABLET | Refills: 3 | Status: SHIPPED | OUTPATIENT
Start: 2022-08-03

## 2022-08-17 RX ORDER — LISINOPRIL AND HYDROCHLOROTHIAZIDE 25; 20 MG/1; MG/1
TABLET ORAL
Qty: 90 TABLET | Refills: 1 | OUTPATIENT
Start: 2022-08-17

## 2022-09-20 DIAGNOSIS — R77.8 LOW SERUM COMPLEMENT FACTOR D: Primary | ICD-10-CM

## 2022-09-20 RX ORDER — ERGOCALCIFEROL 1.25 MG/1
50000 CAPSULE ORAL WEEKLY
Qty: 12 CAPSULE | Refills: 1 | Status: SHIPPED | OUTPATIENT
Start: 2022-09-20 | End: 2022-10-18 | Stop reason: SDUPTHER

## 2022-09-20 NOTE — TELEPHONE ENCOUNTER
Please have pt come to have a vitd level checked before we continue to give this med.  His last level in May was almost normal at 28.2

## 2022-09-23 ENCOUNTER — OFFICE VISIT (OUTPATIENT)
Dept: FAMILY MEDICINE CLINIC | Age: 57
End: 2022-09-23
Payer: COMMERCIAL

## 2022-09-23 VITALS
HEART RATE: 77 BPM | OXYGEN SATURATION: 98 % | DIASTOLIC BLOOD PRESSURE: 84 MMHG | BODY MASS INDEX: 29.53 KG/M2 | RESPIRATION RATE: 18 BRPM | WEIGHT: 200 LBS | SYSTOLIC BLOOD PRESSURE: 122 MMHG

## 2022-09-23 DIAGNOSIS — R25.2 LEG CRAMPS: ICD-10-CM

## 2022-09-23 DIAGNOSIS — R20.2 LEG PARESTHESIA: Primary | ICD-10-CM

## 2022-09-23 DIAGNOSIS — K21.00 GASTROESOPHAGEAL REFLUX DISEASE WITH ESOPHAGITIS WITHOUT HEMORRHAGE: ICD-10-CM

## 2022-09-23 DIAGNOSIS — I10 HYPERTENSION, UNSPECIFIED TYPE: ICD-10-CM

## 2022-09-23 PROCEDURE — 99214 OFFICE O/P EST MOD 30 MIN: CPT | Performed by: NURSE PRACTITIONER

## 2022-09-23 PROCEDURE — 36415 COLL VENOUS BLD VENIPUNCTURE: CPT | Performed by: NURSE PRACTITIONER

## 2022-09-23 PROCEDURE — 90674 CCIIV4 VAC NO PRSV 0.5 ML IM: CPT | Performed by: NURSE PRACTITIONER

## 2022-09-23 PROCEDURE — 90471 IMMUNIZATION ADMIN: CPT | Performed by: NURSE PRACTITIONER

## 2022-09-23 RX ORDER — LOSARTAN POTASSIUM AND HYDROCHLOROTHIAZIDE 12.5; 5 MG/1; MG/1
1 TABLET ORAL DAILY
Qty: 90 TABLET | Refills: 1 | Status: SHIPPED | OUTPATIENT
Start: 2022-09-23

## 2022-09-23 ASSESSMENT — ENCOUNTER SYMPTOMS
VOMITING: 0
BACK PAIN: 0
NAUSEA: 0
RECTAL PAIN: 0
DIARRHEA: 0
ABDOMINAL PAIN: 1
COUGH: 1
WHEEZING: 0
TROUBLE SWALLOWING: 0
CHEST TIGHTNESS: 0
VOICE CHANGE: 1

## 2022-09-23 NOTE — PROGRESS NOTES
9/23/2022    This is a 64 y.o. male   Chief Complaint   Patient presents with    Numbness     Is now only in right thigh. States it feels like the skin is \"really thick\"    Medication Reaction     States he thinks he is having a reaction to Lisinopril. States his voice is raspy and throat will be dry. Has been going on for \"quite a while\"    Leg Pain     States he has started having leg cramps again. States the last time he had these, was when he had colon cancer. Is concerned. Monica Kent is seen today for follow-up. He presents with several concerns. He continues to have superficial numbness on the right thigh. The numbness of the left thigh has resolved. However he describes the numbness as decreased sensation where his skin feels exceptionally thick. He continues to deny any muscular weakness. He is still taking his B complex vitamin. Leg pain/spasms. This has been ongoing for a few weeks. It occurs in the evening or at night. He is concerned because the last time he had leg pains like this is when he was diagnosed with colon cancer. He is afraid that the colon cancer has returned. He is due to see his oncologist next month. He denies any blood in his stool or any changes to his chronic abdominal discomfort. Cough and voice changes: He believes he is developing a reaction to his lisinopril. He states he gets a raspiness or a dry cough intermittently throughout the day. No shortness of breath or wheezing. No tongue swelling. His blood pressure has been well controlled on the Zestoretic. However he wishes to switch to an alternative. He does also have a history of GERD. He has been taking pantoprazole twice daily for many years. And does typically feel like it keeps his symptoms well controlled.        Patient Active Problem List   Diagnosis    Iron deficiency anemia due to chronic blood loss    Polyp of transverse colon    Colon tumor    Bile acid malabsorption syndrome    Iron deficiency anemia secondary to blood loss (chronic)    Anemia, chronic disease    Malignant neoplasm of ascending colon (HCC)    Colon cancer (HCC)    Carcinoma of ascending colon (HCC)    Moderate malnutrition (HCC)    Postprocedural intraabdominal abscess    Sebaceous cyst    Suspected sleep apnea    Hypertensive disorder    Bilateral nephrolithiasis    Polyp of descending colon    Colon, diverticulosis    Personal history of colon cancer, stage I    Insomnia    History of colectomy    Irritable bowel syndrome with diarrhea    Incisional hernia, without obstruction or gangrene    History of incisional hernia repair    Postoperative hypoxemia    Pulmonary venous congestion    Atelectasis    Overweight (BMI 25.0-29. 9)    Elevated TSH    Postoperative pain       Current Outpatient Medications   Medication Sig Dispense Refill    vitamin D (ERGOCALCIFEROL) 1.25 MG (29943 UT) CAPS capsule Take 1 capsule by mouth once a week 12 capsule 1    colestipol (COLESTID) 1 g tablet TAKE 1 TABLET BY MOUTH TWICE A DAY 60 tablet 3    lisinopril-hydroCHLOROthiazide (PRINZIDE;ZESTORETIC) 20-25 MG per tablet TAKE 1 TABLET BY MOUTH EVERY DAY 90 tablet 1    amLODIPine (NORVASC) 5 MG tablet TAKE 1 TABLET BY MOUTH EVERY DAY 90 tablet 0    ondansetron (ZOFRAN) 4 MG tablet Take 1 tablet by mouth 3 times daily as needed for Nausea or Vomiting 10 tablet 0    fluticasone (FLONASE) 50 MCG/ACT nasal spray SHAKE LIQUID AND USE 2 SPRAYS IN EACH NOSTRIL DAILY 16 g 2    pantoprazole (PROTONIX) 20 MG tablet Take 1 tablet by mouth 2 times daily (before meals) 180 tablet 1    amitriptyline (ELAVIL) 50 MG tablet Take 1 tablet by mouth nightly 30 tablet 11     No current facility-administered medications for this visit.        Allergies   Allergen Reactions    Penicillins     Prednisone Nausea And Vomiting       /84 (Site: Left Upper Arm, Position: Sitting)   Pulse 77   Resp 18   Wt 200 lb (90.7 kg)   SpO2 98%   BMI 29.53 kg/m²     Social History     Tobacco Use    Smoking status: Never    Smokeless tobacco: Never   Substance Use Topics    Alcohol use: Not Currently     Comment: occasional       Review of Systems   Constitutional:  Negative for activity change, appetite change, chills, diaphoresis and fever. HENT:  Positive for voice change. Negative for congestion and trouble swallowing. Respiratory:  Positive for cough. Negative for chest tightness and wheezing. Cardiovascular:  Negative for chest pain and palpitations. Gastrointestinal:  Positive for abdominal pain (Chronic discomfort since colon surgery). Negative for diarrhea, nausea, rectal pain and vomiting. Endocrine: Negative. Musculoskeletal:  Positive for myalgias. Negative for back pain. Skin: Negative. Neurological:  Positive for numbness. He also states that he is becoming more forgetful than he has been in the past.   Psychiatric/Behavioral:  Positive for dysphoric mood. The patient is nervous/anxious. Physical Exam  Vitals and nursing note reviewed. Constitutional:       General: He is not in acute distress. Appearance: He is well-developed. He is not diaphoretic. HENT:      Head: Normocephalic and atraumatic. Right Ear: External ear normal.      Left Ear: External ear normal.      Nose: Nose normal.      Mouth/Throat:      Pharynx: No oropharyngeal exudate. Eyes:      General:         Right eye: No discharge. Left eye: No discharge. Conjunctiva/sclera: Conjunctivae normal.   Cardiovascular:      Rate and Rhythm: Normal rate and regular rhythm. Heart sounds: Normal heart sounds. No murmur heard. No friction rub. No gallop. Pulmonary:      Effort: Pulmonary effort is normal. No respiratory distress. Breath sounds: Normal breath sounds. No wheezing or rales. Abdominal:      General: Bowel sounds are normal. There is no distension. Palpations: Abdomen is soft. Tenderness:  There is no abdominal tenderness. Musculoskeletal:         General: No tenderness. Normal range of motion. Cervical back: Normal range of motion and neck supple. Lymphadenopathy:      Cervical: No cervical adenopathy. Skin:     General: Skin is warm and dry. Coloration: Skin is not pale. Findings: No erythema or rash. Neurological:      Mental Status: He is alert and oriented to person, place, and time. Motor: No abnormal muscle tone. Coordination: Coordination normal.   Psychiatric:         Behavior: Behavior normal.         Thought Content: Thought content normal.         Judgment: Judgment normal.       Diagnosis       ICD-10-CM    1. Leg paresthesia  R20.2 Gayla Smith MD (Inpatient and Outpatient EMG), [facility]     Basic Metabolic Panel     Magnesium     Magnesium     Basic Metabolic Panel      2. Leg cramps  E86.1 Basic Metabolic Panel     Magnesium     Magnesium     Basic Metabolic Panel     CANCELED: Basic Metabolic Panel     CANCELED: Magnesium      3. Gastroesophageal reflux disease with esophagitis without hemorrhage  K21.00       4. Hypertension, unspecified type  I10 losartan-hydroCHLOROthiazide (HYZAAR) 50-12.5 MG per tablet     Basic Metabolic Panel     Magnesium     Magnesium     Basic Metabolic Panel           Plan    Check BMP and magnesium levels due to prolonged use of PPI as well as colon resection. Increase water at night  Stopped lisinopril and switch to losartan  Discussed lower dose of hydrochlorothiazide as he could have some dehydration component to his leg cramping. Increase water at night.     Discussed possible link with prolonged PPI use and memory changes, encouraged to follow-up with PCP  Continue to paresthesias, referral to neurology for EMG testing and evaluation    Orders Placed This Encounter   Procedures    Influenza, FLUCELVAX, (age 10 mo+), IM, Preservative Free, 0.5 mL    Basic Metabolic Panel     Standing Status:   Future     Number of Occurrences: 1     Standing Expiration Date:   9/23/2023    Magnesium     Standing Status:   Future     Number of Occurrences:   1     Standing Expiration Date:   9/23/2023    Amaya Mitchell MD (Inpatient and Outpatient EMG), [facility]     Referral Priority:   Routine     Referral Type:   Eval and Treat     Referral Reason:   Specialty Services Required     Referred to Provider:   Grace Brito MD     Requested Specialty:   Neurology     Number of Visits Requested:   1         Orders Placed This Encounter   Medications    losartan-hydroCHLOROthiazide (HYZAAR) 50-12.5 MG per tablet     Sig: Take 1 tablet by mouth daily     Dispense:  90 tablet     Refill:  1         Patient Education:  Plan    Return in about 5 weeks (around 10/28/2022) for HTN chronic care.   Follow-up on paresthesias as well as memory changes with PCP

## 2022-09-24 LAB
ANION GAP SERPL CALCULATED.3IONS-SCNC: 14 MMOL/L (ref 3–16)
BUN BLDV-MCNC: 18 MG/DL (ref 7–20)
CALCIUM SERPL-MCNC: 10 MG/DL (ref 8.3–10.6)
CHLORIDE BLD-SCNC: 96 MMOL/L (ref 99–110)
CO2: 26 MMOL/L (ref 21–32)
CREAT SERPL-MCNC: 1 MG/DL (ref 0.9–1.3)
GFR AFRICAN AMERICAN: >60
GFR NON-AFRICAN AMERICAN: >60
GLUCOSE BLD-MCNC: 98 MG/DL (ref 70–99)
MAGNESIUM: 2.2 MG/DL (ref 1.8–2.4)
POTASSIUM SERPL-SCNC: 4.4 MMOL/L (ref 3.5–5.1)
SODIUM BLD-SCNC: 136 MMOL/L (ref 136–145)

## 2022-10-10 ENCOUNTER — APPOINTMENT (OUTPATIENT)
Dept: CT IMAGING | Age: 57
End: 2022-10-10
Payer: COMMERCIAL

## 2022-10-10 ENCOUNTER — NURSE TRIAGE (OUTPATIENT)
Dept: OTHER | Facility: CLINIC | Age: 57
End: 2022-10-10

## 2022-10-10 ENCOUNTER — HOSPITAL ENCOUNTER (EMERGENCY)
Age: 57
Discharge: HOME | End: 2022-10-10
Payer: COMMERCIAL

## 2022-10-10 VITALS
BODY MASS INDEX: 29.62 KG/M2 | HEART RATE: 88 BPM | RESPIRATION RATE: 15 BRPM | HEIGHT: 69 IN | OXYGEN SATURATION: 94 % | DIASTOLIC BLOOD PRESSURE: 95 MMHG | TEMPERATURE: 98 F | WEIGHT: 200 LBS | SYSTOLIC BLOOD PRESSURE: 144 MMHG

## 2022-10-10 DIAGNOSIS — S32.009A CLOSED FRACTURE OF TRANSVERSE PROCESS OF LUMBAR VERTEBRA, INITIAL ENCOUNTER (HCC): Primary | ICD-10-CM

## 2022-10-10 LAB
A/G RATIO: 2 (ref 1.1–2.2)
ALBUMIN SERPL-MCNC: 4.7 G/DL (ref 3.4–5)
ALP BLD-CCNC: 138 U/L (ref 40–129)
ALT SERPL-CCNC: 35 U/L (ref 10–40)
ANION GAP SERPL CALCULATED.3IONS-SCNC: 10 MMOL/L (ref 3–16)
AST SERPL-CCNC: 31 U/L (ref 15–37)
BASOPHILS ABSOLUTE: 0.1 K/UL (ref 0–0.2)
BASOPHILS RELATIVE PERCENT: 0.9 %
BILIRUB SERPL-MCNC: 0.4 MG/DL (ref 0–1)
BUN BLDV-MCNC: 16 MG/DL (ref 7–20)
CALCIUM SERPL-MCNC: 9.9 MG/DL (ref 8.3–10.6)
CHLORIDE BLD-SCNC: 104 MMOL/L (ref 99–110)
CO2: 26 MMOL/L (ref 21–32)
CREAT SERPL-MCNC: 1.1 MG/DL (ref 0.9–1.3)
EOSINOPHILS ABSOLUTE: 0.1 K/UL (ref 0–0.6)
EOSINOPHILS RELATIVE PERCENT: 1.3 %
GFR AFRICAN AMERICAN: >60
GFR NON-AFRICAN AMERICAN: >60
GLUCOSE BLD-MCNC: 148 MG/DL (ref 70–99)
HCT VFR BLD CALC: 43.6 % (ref 40.5–52.5)
HEMOGLOBIN: 15 G/DL (ref 13.5–17.5)
LYMPHOCYTES ABSOLUTE: 1.3 K/UL (ref 1–5.1)
LYMPHOCYTES RELATIVE PERCENT: 17.2 %
MCH RBC QN AUTO: 30 PG (ref 26–34)
MCHC RBC AUTO-ENTMCNC: 34.3 G/DL (ref 31–36)
MCV RBC AUTO: 87.4 FL (ref 80–100)
MONOCYTES ABSOLUTE: 0.5 K/UL (ref 0–1.3)
MONOCYTES RELATIVE PERCENT: 6.7 %
NEUTROPHILS ABSOLUTE: 5.7 K/UL (ref 1.7–7.7)
NEUTROPHILS RELATIVE PERCENT: 73.9 %
PDW BLD-RTO: 13.5 % (ref 12.4–15.4)
PLATELET # BLD: 236 K/UL (ref 135–450)
PMV BLD AUTO: 7.9 FL (ref 5–10.5)
POTASSIUM REFLEX MAGNESIUM: 4.1 MMOL/L (ref 3.5–5.1)
RBC # BLD: 4.99 M/UL (ref 4.2–5.9)
SODIUM BLD-SCNC: 140 MMOL/L (ref 136–145)
TOTAL PROTEIN: 7.1 G/DL (ref 6.4–8.2)
WBC # BLD: 7.8 K/UL (ref 4–11)

## 2022-10-10 PROCEDURE — 36415 COLL VENOUS BLD VENIPUNCTURE: CPT

## 2022-10-10 PROCEDURE — 96374 THER/PROPH/DIAG INJ IV PUSH: CPT

## 2022-10-10 PROCEDURE — 6370000000 HC RX 637 (ALT 250 FOR IP): Performed by: NURSE PRACTITIONER

## 2022-10-10 PROCEDURE — 80053 COMPREHEN METABOLIC PANEL: CPT

## 2022-10-10 PROCEDURE — 3209999900 CT LUMBAR SPINE TRAUMA RECONSTRUCTION

## 2022-10-10 PROCEDURE — 99285 EMERGENCY DEPT VISIT HI MDM: CPT

## 2022-10-10 PROCEDURE — 6360000002 HC RX W HCPCS: Performed by: NURSE PRACTITIONER

## 2022-10-10 PROCEDURE — 6360000004 HC RX CONTRAST MEDICATION: Performed by: NURSE PRACTITIONER

## 2022-10-10 PROCEDURE — 74177 CT ABD & PELVIS W/CONTRAST: CPT

## 2022-10-10 PROCEDURE — 85025 COMPLETE CBC W/AUTO DIFF WBC: CPT

## 2022-10-10 PROCEDURE — 96375 TX/PRO/DX INJ NEW DRUG ADDON: CPT

## 2022-10-10 RX ORDER — HYDROCODONE BITARTRATE AND ACETAMINOPHEN 5; 325 MG/1; MG/1
1 TABLET ORAL ONCE
Status: COMPLETED | OUTPATIENT
Start: 2022-10-10 | End: 2022-10-10

## 2022-10-10 RX ORDER — MORPHINE SULFATE 4 MG/ML
4 INJECTION, SOLUTION INTRAMUSCULAR; INTRAVENOUS ONCE
Status: COMPLETED | OUTPATIENT
Start: 2022-10-10 | End: 2022-10-10

## 2022-10-10 RX ORDER — HYDROCODONE BITARTRATE AND ACETAMINOPHEN 5; 325 MG/1; MG/1
1 TABLET ORAL ONCE
Status: DISCONTINUED | OUTPATIENT
Start: 2022-10-10 | End: 2022-10-10

## 2022-10-10 RX ORDER — KETOROLAC TROMETHAMINE 30 MG/ML
15 INJECTION, SOLUTION INTRAMUSCULAR; INTRAVENOUS ONCE
Status: COMPLETED | OUTPATIENT
Start: 2022-10-10 | End: 2022-10-10

## 2022-10-10 RX ORDER — ONDANSETRON 2 MG/ML
4 INJECTION INTRAMUSCULAR; INTRAVENOUS ONCE
Status: COMPLETED | OUTPATIENT
Start: 2022-10-10 | End: 2022-10-10

## 2022-10-10 RX ORDER — ONDANSETRON 4 MG/1
4 TABLET, ORALLY DISINTEGRATING ORAL EVERY 8 HOURS PRN
Qty: 8 TABLET | Refills: 0 | Status: SHIPPED | OUTPATIENT
Start: 2022-10-10 | End: 2022-10-28 | Stop reason: ALTCHOICE

## 2022-10-10 RX ORDER — HYDROCODONE BITARTRATE AND ACETAMINOPHEN 5; 325 MG/1; MG/1
1 TABLET ORAL EVERY 6 HOURS PRN
Qty: 12 TABLET | Refills: 0 | Status: SHIPPED | OUTPATIENT
Start: 2022-10-10 | End: 2022-10-12 | Stop reason: SDUPTHER

## 2022-10-10 RX ADMIN — MORPHINE SULFATE 4 MG: 4 INJECTION, SOLUTION INTRAMUSCULAR; INTRAVENOUS at 17:06

## 2022-10-10 RX ADMIN — ONDANSETRON HYDROCHLORIDE 4 MG: 2 INJECTION, SOLUTION INTRAMUSCULAR; INTRAVENOUS at 17:06

## 2022-10-10 RX ADMIN — HYDROCODONE BITARTRATE AND ACETAMINOPHEN 1 TABLET: 5; 325 TABLET ORAL at 20:04

## 2022-10-10 RX ADMIN — IOPAMIDOL 75 ML: 755 INJECTION, SOLUTION INTRAVENOUS at 16:49

## 2022-10-10 RX ADMIN — KETOROLAC TROMETHAMINE 15 MG: 30 INJECTION, SOLUTION INTRAMUSCULAR at 20:05

## 2022-10-10 ASSESSMENT — ENCOUNTER SYMPTOMS
SHORTNESS OF BREATH: 0
SORE THROAT: 0
BLOOD IN STOOL: 0
ABDOMINAL PAIN: 0
DIARRHEA: 0
RHINORRHEA: 0
VOMITING: 0
NAUSEA: 0
BACK PAIN: 0
COUGH: 0
EYE PAIN: 0

## 2022-10-10 ASSESSMENT — PAIN SCALES - GENERAL
PAINLEVEL_OUTOF10: 8
PAINLEVEL_OUTOF10: 8

## 2022-10-10 ASSESSMENT — PAIN - FUNCTIONAL ASSESSMENT: PAIN_FUNCTIONAL_ASSESSMENT: 0-10

## 2022-10-10 ASSESSMENT — PAIN DESCRIPTION - LOCATION: LOCATION: FLANK

## 2022-10-10 ASSESSMENT — PAIN DESCRIPTION - ORIENTATION: ORIENTATION: RIGHT

## 2022-10-10 NOTE — ED NOTES
1913-Call placed to [facility] for Neurosurgery at [facility] for consult. Maria C Palma  10/10/22 1923  1925-Call returned from 2025 [facility] Neuro Surgery spoke with Sheryl Mcnair NP.       Maria C Palma  10/10/22 1931

## 2022-10-10 NOTE — ED PROVIDER NOTES
[address] ED  EMERGENCY DEPARTMENT ENCOUNTER        Pt Name: Karen Burton  MRN: [id number]  [place] 1965  Date of evaluation: 10/10/2022  Provider: PARVEZ Flores CNP  PCP: PARVEZ Connell CNP  Note Started: 3:27 PM EDT      YOSEPH. I have evaluated this patient. My supervising physician was available for consultation. Triage CHIEF COMPLAINT       Chief Complaint   Patient presents with    Fall     Missed a step going into his garage last night, fell on back right side. HISTORY OF PRESENT ILLNESS   (Location/Symptom, Timing/Onset, Context/Setting, Quality, Duration, Modifying Factors, Severity)  Note limiting factors. Chief Complaint: Fall with symptoms of right flank pain    Karen Burton is a 64 y.o. male who presents to the emergency department symptoms of right flank pain after a fall. Patient reported that he injured the right flank after he had a fall which was described as a slip after he lost his footing while walking down 2 steps. He states that whenever he fell he landed on his side. He denies any difficulty or painful breathing. No rib pain. He is concerned about the right flank area. Denies really any abdominal pain does report pain with movement in the right flank area. No obvious bruising. He reported this happened last night. Symptoms seem to be worsened with weightbearing. He reports symptoms of pain in the low back but denies any midline back tenderness. No head or neck injury. No chest or abdominal pain. No upper or lower extremity injury. Nursing Notes were all reviewed and agreed with or any disagreements were addressed in the HPI. REVIEW OF SYSTEMS    (2-9 systems for level 4, 10 or more for level 5)     Review of Systems   Constitutional:  Negative for chills, diaphoresis and fever. HENT:  Negative for congestion, ear pain, rhinorrhea and sore throat. Eyes:  Negative for pain and visual disturbance. Respiratory:  Negative for cough and shortness of breath. Cardiovascular:  Negative for chest pain and leg swelling. Gastrointestinal:  Negative for abdominal pain, blood in stool, diarrhea, nausea and vomiting. Genitourinary:  Positive for flank pain. Negative for difficulty urinating, dysuria and frequency. Right flank pain   Musculoskeletal:  Negative for back pain and neck pain. Skin:  Negative for rash and wound. Neurological:  Negative for dizziness and light-headedness.      PAST MEDICAL HISTORY     Past Medical History:   Diagnosis Date    Colon cancer ([facility] 04/2019    GERD (gastroesophageal reflux disease)     resolved    Kidney stones     Malignant neoplasm of ascending colon ([facility] 4/9/2019    Uncontrolled hypertension 7/19/2019       SURGICAL HISTORY     Past Surgical History:   Procedure Laterality Date    APPENDECTOMY      COLON SURGERY      I & D of colon abscess [post op    COLONOSCOPY N/A 4/4/2019    COLONOSCOPY WITH BIOPSY performed by Lukas Monte MD at [facility]  4/4/2019    COLONOSCOPY POLYPECTOMY SNARE/COLD BIOPSY performed by Lukas Monte MD at [facility] 6/12/2020    COLONOSCOPY POLYPECTOMY [address] [facility] performed by Leslie Hilario MD at  [address] 4/16/2019    ROBOTIC LAPAROSCOPIC RIGHT COLECTOMY CPT CODE - 26039 performed by Asad Kohler MD at [place] [address] 5/31/2022    ROBOTIC [address] [place] performed by Asad Kohler MD at [facility] N/A 4/4/2019    EGD performed by Lukas Monte MD at [facility]       Discharge Medication List as of 10/10/2022  8:09 PM        CONTINUE these medications which have NOT CHANGED    Details   losartan-hydroCHLOROthiazide (HYZAAR) 50-12.5 MG per tablet Take 1 tablet by mouth daily, Disp-90 tablet, R-1Normal vitamin D (ERGOCALCIFEROL) 1.25 MG (66568 UT) CAPS capsule Take 1 capsule by mouth once a week, Disp-12 capsule, R-1Normal      colestipol (COLESTID) 1 g tablet TAKE 1 TABLET BY MOUTH TWICE A DAY, Disp-60 tablet, R-3Normal      amLODIPine (NORVASC) 5 MG tablet TAKE 1 TABLET BY MOUTH EVERY DAY, Disp-90 tablet, R-0Normal      ondansetron (ZOFRAN) 4 MG tablet Take 1 tablet by mouth 3 times daily as needed for Nausea or Vomiting, Disp-10 tablet, R-0Normal      pantoprazole (PROTONIX) 20 MG tablet Take 1 tablet by mouth 2 times daily (before meals), Disp-180 tablet, R-1Normal      fluticasone (FLONASE) 50 MCG/ACT nasal spray SHAKE LIQUID AND USE 2 SPRAYS IN EACH NOSTRIL DAILY, Disp-16 g, R-2Normal      amitriptyline (ELAVIL) 50 MG tablet Take 1 tablet by mouth nightly, Disp-30 tablet, R-11Normal             ALLERGIES     Penicillins and Prednisone    FAMILYHISTORY       Family History   Problem Relation Age of Onset    Other Mother         diverticulitis    Diabetes Father     Cancer Father         bladder  w mets        SOCIAL HISTORY       Social History     Socioeconomic History    Marital status:      Spouse name: None    Number of children: None    Years of education: None    Highest education level: None   Tobacco Use    Smoking status: Never    Smokeless tobacco: Never   Substance and Sexual Activity    Alcohol use: Not Currently     Comment: occasional    Drug use: Never       SCREENINGS    [place] Coma Scale  Eye Opening: Spontaneous  Best Verbal Response: Oriented  Best Motor Response: Obeys commands  Elen Coma Scale Score: 15        PHYSICAL EXAM    (up to 7 for level 4, 8 or more for level 5)     ED Triage Vitals   BP Temp Temp src Pulse Resp SpO2 Height Weight   -- -- -- -- -- -- -- --       Physical Exam  Vitals and nursing note reviewed. Constitutional:       Appearance: Normal appearance. He is not toxic-appearing or diaphoretic. HENT:      Head: Normocephalic and atraumatic.       Nose: Nose normal.   Eyes:      General:         Right eye: No discharge. Left eye: No discharge. Cardiovascular:      Rate and Rhythm: Normal rate and regular rhythm. Pulses: Normal pulses. Heart sounds: No murmur heard. Pulmonary:      Effort: Pulmonary effort is normal. No respiratory distress. Breath sounds: No wheezing or rhonchi. Abdominal:      General: Bowel sounds are normal.      Palpations: Abdomen is soft. Tenderness: There is no abdominal tenderness. There is no guarding or rebound. Comments: Tenderness to the right flank with palpation. Musculoskeletal:         General: Normal range of motion. Cervical back: Normal range of motion and neck supple. Thoracic back: Normal.      Lumbar back: Normal. No tenderness. Negative right straight leg raise test and negative left straight leg raise test.   Skin:     General: Skin is warm and dry. Findings: No abrasion, bruising or erythema. Neurological:      General: No focal deficit present. Mental Status: He is alert and oriented to person, place, and time. Psychiatric:         Mood and Affect: Mood normal.         Behavior: Behavior normal.       DIAGNOSTIC RESULTS   LABS:    Labs Reviewed   COMPREHENSIVE METABOLIC PANEL W/ REFLEX TO MG FOR LOW K - Abnormal; Notable for the following components:       Result Value    Glucose 148 (*)     Alkaline Phosphatase 138 (*)     All other components within normal limits   CBC WITH AUTO DIFFERENTIAL       When ordered, only abnormal lab results are displayed. All other labs were within normal range or not returned as of this dictation. EKG: When ordered, EKG's are interpreted by the Emergency Department Physician in the absence of a cardiologist.  Please see their note for interpretation of EKG.       RADIOLOGY:   Non-plain film images such as CT, Ultrasound and MRI are read by the radiologist. Plain radiographic images are visualized andpreliminarily interpreted by the  ED Provider with the below findings:        Interpretation [facility] Radiologist below, if available at the time of this note:    CT LUMBAR SPINE TRAUMA RECONSTRUCTION   Final Result   1. Acute traumatic nondisplaced fracture involving the right transverse   process of L1, L2 and L3.   2. No vertebral body fractures seen. 3. No other acute intra-abdominal or pelvic abnormality. 4. Punctate nonobstructive kidney stones. 5. Diverticulosis without obvious inflammation. 6. Mild hepatic steatosis. 7. Small fat containing inguinal and ventral hernias. CT ABDOMEN PELVIS W IV CONTRAST Additional Contrast? None   Final Result   1. Acute traumatic nondisplaced fracture involving the right transverse   process of L1, L2 and L3.   2. No vertebral body fractures seen. 3. No other acute intra-abdominal or pelvic abnormality. 4. Punctate nonobstructive kidney stones. 5. Diverticulosis without obvious inflammation. 6. Mild hepatic steatosis. 7. Small fat containing inguinal and ventral hernias. No results found.       PROCEDURES   Unless otherwise noted below, none     Procedures    CRITICAL CARE TIME   N/A    CONSULTS:  IP CONSULT TO NEUROSURGERY      EMERGENCY DEPARTMENT COURSE and DIFFERENTIAL DIAGNOSIS/MDM:   Vitals:    Vitals:    10/10/22 1544 10/10/22 1549 10/10/22 2008   BP:  (!) 177/89 (!) 144/95   Pulse:  100 88   Resp:  14 15   Temp:  98 °F (36.7 °C) 98 °F (36.7 °C)   TempSrc:  Oral Oral   SpO2:  98% 94%   Weight: 200 lb (90.7 kg)     Height: 5' 9\" (1.753 m)         Patient was given thefollowing medications:  Medications   iopamidol (ISOVUE-370) 76 % injection 75 mL (75 mLs IntraVENous Given 10/10/22 1649)   morphine sulfate (PF) injection 4 mg (4 mg IntraVENous Given 10/10/22 1706)   ondansetron (ZOFRAN) injection 4 mg (4 mg IntraVENous Given 10/10/22 1706)   HYDROcodone-acetaminophen (NORCO) 5-325 MG per tablet 1 tablet (1 tablet Oral Given 10/10/22 2004)   ketorolac (TORADOL) injection 15 mg (15 mg IntraVENous Given 10/10/22 2005)         Is this patient to be included in the SEP-1 Core Measure due to severe sepsis or septic shock? No   Exclusion criteria - the patient is NOT to be included for SEP-1 Core Measure due to: Infection is not suspected    A detailed discussion with the patient involving the findings on his CT. I also spoke with the nurse neurosurgery on-call provider. They advised that at this time nothing more to do about for pain control. Advised to go ahead and provide pain control for the patient but no bracing or surgical intervention needed. At this time we will go and plan for discharge. Patient verbalized understanding and agrees. No other acute complaints noted at this time. Patient's pain seems to be drastically improved and patient does feel better. I went and provided him Norco as well as Tylenol Motrin for his symptoms of pain. Patient verbalized understanding and agrees    I am the Primary Clinician of Record. FINAL IMPRESSION      1. Closed fracture of transverse process of lumbar vertebra, initial encounter [facility])          DISPOSITION/PLAN   DISPOSITION Decision To Discharge 10/10/2022 08:08:58 PM      PATIENT REFERREDTO:  Renu Brown, APRN - CNP  [address] [facility] [phone]    Schedule an appointment as soon as possible for a visit       [facility] ED  [address] [place] [address]  Schedule an appointment as soon as possible for a visit       Delfina Medrano [facility]  [address] [facility] [address]  [phone]    Schedule an appointment as soon as possible for a visit       DISCHARGE MEDICATIONS:  Discharge Medication List as of 10/10/2022  8:09 PM        START taking these medications    Details   HYDROcodone-acetaminophen (NORCO) 5-325 MG per tablet Take 1 tablet by mouth every 6 hours as needed for Pain for up to 3 days. Intended supply: 3 days.  Take lowest dose possible to manage pain, Disp-12 tablet, R-0Normal      ondansetron (ZOFRAN ODT) 4 MG disintegrating tablet Take 1 tablet by mouth every 8 hours as needed for Nausea, Disp-8 tablet, R-0Normal             DISCONTINUED MEDICATIONS:  Discharge Medication List as of 10/10/2022  8:09 PM                 (Please note that portions ofthis note were completed with a voice recognition program.  Efforts were made to edit the dictations but occasionally words are mis-transcribed.)    PARVEZ Hahn CNP (electronically signed)             PARVEZ Hahn CNP  10/10/22 1199

## 2022-10-10 NOTE — TELEPHONE ENCOUNTER
Pt advised to go to ED. States if he twists or turns even a little bit, has an excruciating pain. He is worried about fracturing his hip or bruising a kidney. Pt agreed to go to ED.

## 2022-10-10 NOTE — TELEPHONE ENCOUNTER
Received call from [facility] with [employer] Complaint. Subjective: Caller states \"I fell a few days ago\"     Current Symptoms: Leo Moodyers on steps in garage last night. Landed on his R hip and kidney area. R hip and flank pain. No bruising or swelling. Some redness. Unable to put pressure on R leg. Pain increases when turns or twist.     Onset: 1 day ago; unchanged    Associated Symptoms: reduced activity    Pain Severity: 10+/10; sharp; waxing and waning    What has been tried: Advil    LMP: NA Pregnant: NA    Recommended disposition: Go to ED/UCC Now (Or to Office with PCP Approval)    Care advice provided, patient verbalizes understanding; denies any other questions or concerns; instructed to call back for any new or worsening symptoms. Writer provided warm transfer to [facility] for second level triage. Attention Provider: Thank you for allowing me to participate in the care of your patient. The patient was connected to triage in response to information provided to the ECC/PSC. Please do not respond through this encounter as the response is not directed to a shared pool.     Reason for Disposition   SEVERE pain (e.g., excruciating)    Protocols used: Hip Injury-ADULT-[place]

## 2022-10-10 NOTE — Clinical Note
Ramirez Knight was seen and treated in our emergency department on 10/10/2022. He may return to work on 10/13/2022. If you have any questions or concerns, please don't hesitate to call.       Faith Aquino, APRN - CNP

## 2022-10-11 ENCOUNTER — TELEPHONE (OUTPATIENT)
Dept: ORTHOPEDIC SURGERY | Age: 57
End: 2022-10-11

## 2022-10-11 NOTE — TELEPHONE ENCOUNTER
Left voicemail, Dr. Tonie Foster doesn't have anything sooner, but SW can see him sooner and to call scheduling at [phone] to make an appointment.

## 2022-10-11 NOTE — TELEPHONE ENCOUNTER
Appointment Request     Patient requesting earlier appointment: Yes  Appointment offered to patient: 11/23  Patient Contact Number:  [phone]      PT REQ [facility] BUT CAN DO [facility] LOCATION. Closed fracture of transverse process of lumbar vertebra  REQ SOONER APPT. REF BY ED. CONTACT PT AT NUMBER LISTED TO ADVISE.

## 2022-10-12 ENCOUNTER — OFFICE VISIT (OUTPATIENT)
Dept: FAMILY MEDICINE CLINIC | Age: 57
End: 2022-10-12
Payer: COMMERCIAL

## 2022-10-12 VITALS
DIASTOLIC BLOOD PRESSURE: 88 MMHG | OXYGEN SATURATION: 98 % | HEART RATE: 95 BPM | SYSTOLIC BLOOD PRESSURE: 134 MMHG | RESPIRATION RATE: 16 BRPM

## 2022-10-12 DIAGNOSIS — S32.009A CLOSED FRACTURE OF TRANSVERSE PROCESS OF LUMBAR VERTEBRA, INITIAL ENCOUNTER (HCC): Primary | ICD-10-CM

## 2022-10-12 PROCEDURE — 99214 OFFICE O/P EST MOD 30 MIN: CPT | Performed by: NURSE PRACTITIONER

## 2022-10-12 PROCEDURE — 90471 IMMUNIZATION ADMIN: CPT | Performed by: NURSE PRACTITIONER

## 2022-10-12 PROCEDURE — 90715 TDAP VACCINE 7 YRS/> IM: CPT | Performed by: NURSE PRACTITIONER

## 2022-10-12 RX ORDER — METHOCARBAMOL 500 MG/1
500 TABLET, FILM COATED ORAL 3 TIMES DAILY
Qty: 90 TABLET | Refills: 0 | Status: SHIPPED | OUTPATIENT
Start: 2022-10-12

## 2022-10-12 RX ORDER — HYDROCODONE BITARTRATE AND ACETAMINOPHEN 5; 325 MG/1; MG/1
1 TABLET ORAL EVERY 4 HOURS PRN
Qty: 42 TABLET | Refills: 0 | Status: SHIPPED | OUTPATIENT
Start: 2022-10-12 | End: 2022-10-18 | Stop reason: SDUPTHER

## 2022-10-12 ASSESSMENT — ENCOUNTER SYMPTOMS
RESPIRATORY NEGATIVE: 1
GASTROINTESTINAL NEGATIVE: 1
BACK PAIN: 1

## 2022-10-12 NOTE — PROGRESS NOTES
10/12/2022    This is a 64 y.o. male   Chief Complaint   Patient presents with    Follow-up     Pt was taking trash from kitchen out to the [facility] bay. States he missed a step and hit his back on the steps. 3 Fractured Lumbar Vertebrae (L1, L2, L3). Pt states ED gave him Norco which helps some with getting up and down   . Mary Ray is seen today for ED follow up. He fell his steps from his kitchen to his garage. This is approximately 3 steps. He fell on his back onto the steps causing significant pain in the right flank. He has trouble with standing, walking, twisting. Significant pain with radiculopathy into his leg. He was seen in the emergency room where a CT scan showed fractures of L1-L3. He was given Norco and recommended following up with Dr. Kenton Chauhan  However Dr. Kenton Chauhan does not have any openings until late November. He notes no loss of bowel or bladder control. No saddle anesthesia.         Past Medical History:   Diagnosis Date    Colon cancer ([facility] 04/2019    GERD (gastroesophageal reflux disease)     resolved    Kidney stones     Malignant neoplasm of ascending colon ([facility] 4/9/2019    Uncontrolled hypertension 7/19/2019       Past Surgical History:   Procedure Laterality Date    APPENDECTOMY      COLON SURGERY      I & D of colon abscess [post op    COLONOSCOPY N/A 4/4/2019    COLONOSCOPY WITH BIOPSY performed by Senthil Farias MD at [address] [facility]  4/4/2019    COLONOSCOPY POLYPECTOMY SNARE/COLD BIOPSY performed by Senthil Farias MD at [facility] 6/12/2020    COLONOSCOPY POLYPECTOMY SNARE performed by Winnie Rico MD at [address] 4/16/2019    ROBOTIC LAPAROSCOPIC RIGHT COLECTOMY CPT CODE - 91065 performed by Brent Fernandez MD at [place] [address] 5/31/2022    ROBOTIC [address] performed by Brent Fernandez MD at [address] [place] [address] ENDOSCOPY N/A 4/4/2019    EGD performed by Chaz Bermeo MD at [address] History    Marital status:      Spouse name: Not on file    Number of children: Not on file    Years of education: Not on file    Highest education level: Not on file   Occupational History    Not on file   Tobacco Use    Smoking status: Never    Smokeless tobacco: Never   Vaping Use    Vaping Use: Not on file   Substance and Sexual Activity    Alcohol use: Not Currently     Comment: occasional    Drug use: Never    Sexual activity: Not on file   Other Topics Concern    Not on file   Social History Narrative    Not on file     Social Determinants of Health     Financial Resource Strain: Not on file   Food Insecurity: Not on file   Transportation Needs: Not on file   Physical Activity: Not on file   Stress: Not on file   Social Connections: Not on file   Intimate Partner Violence: Not on file   Housing Stability: Not on file       Family History   Problem Relation Age of Onset    Other Mother         diverticulitis    Diabetes Father     Cancer Father         bladder  w mets       Current Outpatient Medications   Medication Sig Dispense Refill    methocarbamol (ROBAXIN) 500 MG tablet Take 1 tablet by mouth 3 times daily 90 tablet 0    HYDROcodone-acetaminophen (NORCO) 5-325 MG per tablet Take 1 tablet by mouth every 4 hours as needed for Pain for up to 7 days. Intended supply: 7 days.  Take lowest dose possible to manage pain 42 tablet 0    ondansetron (ZOFRAN ODT) 4 MG disintegrating tablet Take 1 tablet by mouth every 8 hours as needed for Nausea 8 tablet 0    losartan-hydroCHLOROthiazide (HYZAAR) 50-12.5 MG per tablet Take 1 tablet by mouth daily 90 tablet 1    vitamin D (ERGOCALCIFEROL) 1.25 MG (91880 UT) CAPS capsule Take 1 capsule by mouth once a week 12 capsule 1    colestipol (COLESTID) 1 g tablet TAKE 1 TABLET BY MOUTH TWICE A DAY 60 tablet 3    amLODIPine (NORVASC) 5 MG tablet TAKE 1 TABLET BY MOUTH EVERY DAY 90 tablet 0    ondansetron (ZOFRAN) 4 MG tablet Take 1 tablet by mouth 3 times daily as needed for Nausea or Vomiting 10 tablet 0    fluticasone (FLONASE) 50 MCG/ACT nasal spray SHAKE LIQUID AND USE 2 SPRAYS IN EACH NOSTRIL DAILY 16 g 2    pantoprazole (PROTONIX) 20 MG tablet Take 1 tablet by mouth 2 times daily (before meals) 180 tablet 1    amitriptyline (ELAVIL) 50 MG tablet Take 1 tablet by mouth nightly 30 tablet 11     No current facility-administered medications for this visit.        Allergies   Allergen Reactions    Penicillins     Prednisone Nausea And Vomiting       Admission on 10/10/2022, Discharged on 10/10/2022   Component Date Value Ref Range Status    WBC 10/10/2022 7.8  4.0 - 11.0 K/uL Final    RBC 10/10/2022 4.99  4.20 - 5.90 M/uL Final    Hemoglobin 10/10/2022 15.0  13.5 - 17.5 g/dL Final    Hematocrit 10/10/2022 43.6  40.5 - 52.5 % Final    MCV 10/10/2022 87.4  80.0 - 100.0 fL Final    MCH 10/10/2022 30.0  26.0 - 34.0 pg Final    MCHC 10/10/2022 34.3  31.0 - 36.0 g/dL Final    RDW 10/10/2022 13.5  12.4 - 15.4 % Final    Platelets 30/06/6247 236  135 - 450 K/uL Final    MPV 10/10/2022 7.9  5.0 - 10.5 fL Final    Neutrophils % 10/10/2022 73.9  % Final    Lymphocytes % 10/10/2022 17.2  % Final    Monocytes % 10/10/2022 6.7  % Final    Eosinophils % 10/10/2022 1.3  % Final    Basophils % 10/10/2022 0.9  % Final    Neutrophils Absolute 10/10/2022 5.7  1.7 - 7.7 K/uL Final    Lymphocytes Absolute 10/10/2022 1.3  1.0 - 5.1 K/uL Final    Monocytes Absolute 10/10/2022 0.5  0.0 - 1.3 K/uL Final    Eosinophils Absolute 10/10/2022 0.1  0.0 - 0.6 K/uL Final    Basophils Absolute 10/10/2022 0.1  0.0 - 0.2 K/uL Final    Sodium 10/10/2022 140  136 - 145 mmol/L Final    Potassium reflex Magnesium 10/10/2022 4.1  3.5 - 5.1 mmol/L Final    Chloride 10/10/2022 104  99 - 110 mmol/L Final    CO2 10/10/2022 26  21 - 32 mmol/L Final    Anion Gap 10/10/2022 10  3 - 16 Final Conjunctiva/sclera: Conjunctivae normal.   Cardiovascular:      Rate and Rhythm: Normal rate and regular rhythm. Heart sounds: Normal heart sounds. No murmur heard. No friction rub. No gallop. Pulmonary:      Effort: Pulmonary effort is normal. No respiratory distress. Breath sounds: Normal breath sounds. No wheezing or rales. Abdominal:      General: Bowel sounds are normal. There is no distension. Palpations: Abdomen is soft. Tenderness: There is no abdominal tenderness. Musculoskeletal:         General: No tenderness. Normal range of motion. Cervical back: Normal range of motion and neck supple. Comments: Appears acutely uncomfortable. . sensation intact to light stimuli     Lymphadenopathy:      Cervical: No cervical adenopathy. Skin:     General: Skin is warm and dry. Coloration: Skin is not pale. Findings: No erythema or rash. Neurological:      Mental Status: He is alert and oriented to person, place, and time. Motor: No abnormal muscle tone. Coordination: Coordination normal.   Psychiatric:         Behavior: Behavior normal.         Thought Content:  Thought content normal.         Judgment: Judgment normal.       Diagnosis    ICD-10-CM    1. Closed fracture of transverse process of lumbar vertebra, initial encounter [facility])  [address] [facility] [address], [place], Orthopedic Surgery, [facility]     HYDROcodone-acetaminophen ([address]) 5-325 MG per tablet          Assessment andPlan  Reviewed images  Referral to [facility] for urgent evaluation and possible treatment  Refilled norco and start robaxin  Push fluids  Walker for stability in transfers  Tdap today     Orders Placed This Encounter   Procedures    Tdnikia, JANETH, (age 10y-63y), [facility], [place], Orthopedic Surgery, [facility]     Referral Priority:   Routine     Referral Type:   Eval and Treat     Referral Reason:   Specialty Services Required     Referred to Provider:   Queenie Curran Manav Holliday     Requested Specialty:   Physician Assistant     Number of Visits Requested:   1       Orders Placed This Encounter   Medications    methocarbamol (ROBAXIN) 500 MG tablet     Sig: Take 1 tablet by mouth 3 times daily     Dispense:  90 tablet     Refill:  0    HYDROcodone-acetaminophen (NORCO) 5-325 MG per tablet     Sig: Take 1 tablet by mouth every 4 hours as needed for Pain for up to 7 days. Intended supply: 7 days. Take lowest dose possible to manage pain     Dispense:  42 tablet     Refill:  0     Reduce doses taken as pain becomes manageable       Return in about 2 weeks (around 10/26/2022) for joint/back pain follow-up.

## 2022-10-13 ENCOUNTER — OFFICE VISIT (OUTPATIENT)
Dept: ORTHOPEDIC SURGERY | Age: 57
End: 2022-10-13
Payer: COMMERCIAL

## 2022-10-13 VITALS — WEIGHT: 200 LBS | BODY MASS INDEX: 29.62 KG/M2 | HEIGHT: 69 IN

## 2022-10-13 DIAGNOSIS — S32.009A CLOSED FRACTURE OF TRANSVERSE PROCESS OF LUMBAR VERTEBRA, INITIAL ENCOUNTER (HCC): Primary | ICD-10-CM

## 2022-10-13 PROCEDURE — 99204 OFFICE O/P NEW MOD 45 MIN: CPT | Performed by: PHYSICIAN ASSISTANT

## 2022-10-13 RX ORDER — SENNA AND DOCUSATE SODIUM 50; 8.6 MG/1; MG/1
2 TABLET, FILM COATED ORAL DAILY
Qty: 30 TABLET | Refills: 1 | Status: SHIPPED | OUTPATIENT
Start: 2022-10-13

## 2022-10-13 NOTE — PROGRESS NOTES
Date:  10/13/2022    Name:  Adi Lees  Address:  [address] [place] [id number]    :  1965      Age:   64 y.o.    SSN:  [id number]      Medical Record Number:  [id number]    Reason for Visit:    Chief Complaint    New Patient (NP/LUMBAR/CARRYING TRASH OUT TO THE GARAGE/ FELL DOWN THE STEPS AND HIT BACK ONTO THE STAIRS/WENT TO THE ER/CT & NORCO/PCP PROVIDED MUSCLE RELAXERS/ 10/10 PAIN/Hx of CANCER/HERNIA POST SUGRICAL REMOVALOF PART OF COLON, RIGHT LEG NUMBNESS CONSTANT, BUT HAS NOT PROGRESSED OR WORSENED/NO LOSS OF BOWEL OR BLADDER FUNCTION/DIABETIC NEG)      DOS:10/13/2022     HPI: Joey Schaffer is a 64 y.o. male here today for evaluation of low back pain. Unfortunately, patient suffered a fall and injured his right flank on 10/10/2022. He was seen in the emergency department, diagnosed with a acute traumatic nondisplaced fracture of the right transverse processes of L1-L2 and L3. He was treated with pain medication, muscle relaxers and given a referral to orthopedics. Today patient states that he is in a good amount of pain. Continues to deny any blood in urine. Though he has difficulty taking a deep breath due to pain, he is able to do so. Of note patient has baseline n/t following bowel removal procedure      ROS: Review of systems reviewed from Patient History Form completed today and available in the patient's chart under the Media tab.        Past Medical History:   Diagnosis Date    Colon cancer ([facility] 2019    GERD (gastroesophageal reflux disease)     resolved    Kidney stones     Malignant neoplasm of ascending colon ([facility] 2019    Uncontrolled hypertension 2019        Past Surgical History:   Procedure Laterality Date    APPENDECTOMY      COLON SURGERY      I & D of colon abscess [post op    COLONOSCOPY N/A 2019    COLONOSCOPY WITH BIOPSY performed by Vandana Robert MD at [facility]  2019    COLONOSCOPY POLYPECTOMY SNARE/COLD BIOPSY performed by Sumner Pallas, MD at [facility] N/A 6/12/2020    COLONOSCOPY POLYPECTOMY SNARE performed by Yung Marques MD at [address] 4/16/2019    ROBOTIC LAPAROSCOPIC RIGHT COLECTOMY CPT CODE - 28717 performed by Delmi Brenner MD at [address] [facility] N/A 5/31/2022    ROBOTIC [address] performed by Delmi Brenner MD at [address] [facility] N/A 4/4/2019    EGD performed by Sumner Pallas, MD at [facility]       Family History   Problem Relation Age of Onset    Other Mother         diverticulitis    Diabetes Father     Cancer Father         bladder  w mets       Social History     Socioeconomic History    Marital status:      Spouse name: None    Number of children: None    Years of education: None    Highest education level: None   Tobacco Use    Smoking status: Never    Smokeless tobacco: Never   Substance and Sexual Activity    Alcohol use: Not Currently     Comment: occasional    Drug use: Never       Current Outpatient Medications   Medication Sig Dispense Refill    sennosides-docusate sodium (SENOKOT-S) 8.6-50 MG tablet Take 2 tablets by mouth daily 30 tablet 1    methocarbamol (ROBAXIN) 500 MG tablet Take 1 tablet by mouth 3 times daily 90 tablet 0    HYDROcodone-acetaminophen (NORCO) 5-325 MG per tablet Take 1 tablet by mouth every 4 hours as needed for Pain for up to 7 days. Intended supply: 7 days. Take lowest dose possible to manage pain 42 tablet 0    Misc.  Devices (WALKER) MISC 1 each by Does not apply route once for 1 dose 1 each 0    ondansetron (ZOFRAN ODT) 4 MG disintegrating tablet Take 1 tablet by mouth every 8 hours as needed for Nausea 8 tablet 0    losartan-hydroCHLOROthiazide (HYZAAR) 50-12.5 MG per tablet Take 1 tablet by mouth daily 90 tablet 1    vitamin D (ERGOCALCIFEROL) 1.25 MG (72386 UT) CAPS capsule Take 1 capsule by mouth once a week 12 capsule 1    colestipol (COLESTID) 1 g tablet TAKE 1 TABLET BY MOUTH TWICE A DAY 60 tablet 3    amLODIPine (NORVASC) 5 MG tablet TAKE 1 TABLET BY MOUTH EVERY DAY 90 tablet 0    ondansetron (ZOFRAN) 4 MG tablet Take 1 tablet by mouth 3 times daily as needed for Nausea or Vomiting 10 tablet 0    pantoprazole (PROTONIX) 20 MG tablet Take 1 tablet by mouth 2 times daily (before meals) 180 tablet 1    fluticasone (FLONASE) 50 MCG/ACT nasal spray SHAKE LIQUID AND USE 2 SPRAYS IN EACH NOSTRIL DAILY 16 g 2    amitriptyline (ELAVIL) 50 MG tablet Take 1 tablet by mouth nightly 30 tablet 11     No current facility-administered medications for this visit. Allergies   Allergen Reactions    Penicillins     Prednisone Nausea And Vomiting       Vital signs:  Ht 5' 9\" (1.753 m)   Wt 200 lb (90.7 kg)   BMI 29.53 kg/m²      Lumbral/sacral examination:     Gait & station: Use of a wheelchair    Inspection: Local inspection shows no step-off or bruising. Lumbar alignment is normal.    Skin: There are no rashes, ulcerations or lesions. Palpation: Severe paraspinal spasm, severe midline tenderness to palpation    Range of Motion: Limited due to pain    Strength: Strength testing is 5/5 in all muscle groups tested. Reflexes: Baseline numbness and tingling in the right leg compared to the left. Additional Examinations: unable to perform straight leg test, Trendelenburg test        Diagnostics:  Radiology:       Pertinent imaging was obtained, interpreted, and reviewed with the patient today, both images and report. CT LUMBAR SPINE TRAUMA RECONSTRUCTION   Final Result   1. Acute traumatic nondisplaced fracture involving the right transverse   process of L1, L2 and L3.   2. No vertebral body fractures seen. 3. No other acute intra-abdominal or pelvic abnormality. 4. Punctate nonobstructive kidney stones. 5. Diverticulosis without obvious inflammation. 6. Mild hepatic steatosis.    7. Small fat containing inguinal and ventral hernias. Office Procedures:  Orders Placed This Encounter   Procedures    Meagan Monet MD, Orthopedic Surgery (Spine), [facility]     Referral Priority:   Routine     Referral Type:   Eval and Treat     Referral Reason:   Specialty Services Required     Referred to Provider:   Tim Seals MD     Requested Specialty:   Orthopedic Surgery     Number of Visits Requested:   1    External Referral To Neurosurgery     Referral Priority:   Routine     Referral Type:   Eval and Treat     Referral Reason:   Specialty Services Required     Requested Specialty:   Neurosurgery     Number of Visits Requested:   1         Assessment: 66-year-old male with nondisplaced fracture of the right transverse processes of L1-L2 and L3    Plan: Pertinent imaging was reviewed. The etiology, natural history, and treatment options for the disorder were discussed. The roles of activity medication, antiinflammatories, injections, bracing, physical therapy, and surgical interventions were all described to the patient and questions were answered. Patient suffered a traumatic transverse process fracture of the L1, L2, and L3 vertebrae. These are nondisplaced. At this time he is a candidate for a brace, muscle relaxers, anti-inflammatories, and a follow-up with Dr. Bartolo Ruvalcaba. He would like to proceed with this. Incentive spirometry was encouraged. Stool softener use encouraged. Patient is a candidate for a mold and form back brace. Ramirez Knight is in agreement with this plan. All questions were answered to patient's satisfaction and was encouraged to call with any further questions. Total time spent for evaluation, education, and development of treatment plan: 45 minutes    Robbie Obrien [facility]  10/13/2022    This dictation was performed with a verbal recognition program (DRAGON) and it was checked for errors.   It is possible that there are still dictated errors within this office note. If so, please bring any areas to my attention for an addendum. All efforts were made to ensure that this office note is accurate.

## 2022-10-17 ENCOUNTER — TELEPHONE (OUTPATIENT)
Dept: FAMILY MEDICINE CLINIC | Age: 57
End: 2022-10-17

## 2022-10-17 NOTE — TELEPHONE ENCOUNTER
The ankle swelling is likely due to his decreased mobility. I would like someone to look at the degree of swelling. He should try ankle pumps to improve his circulation.

## 2022-10-17 NOTE — TELEPHONE ENCOUNTER
Patient called. Hew as seen last week by MALLIKA Huerta and Ryder Pascal.     He now has bilateral ankle swelling and want to know what Meredith Huerta would recommend

## 2022-10-18 DIAGNOSIS — S32.009A CLOSED FRACTURE OF TRANSVERSE PROCESS OF LUMBAR VERTEBRA, INITIAL ENCOUNTER (HCC): ICD-10-CM

## 2022-10-19 RX ORDER — HYDROCODONE BITARTRATE AND ACETAMINOPHEN 5; 325 MG/1; MG/1
1-2 TABLET ORAL EVERY 6 HOURS PRN
Qty: 60 TABLET | Refills: 0 | Status: SHIPPED | OUTPATIENT
Start: 2022-10-19 | End: 2022-10-26

## 2022-10-19 RX ORDER — ERGOCALCIFEROL 1.25 MG/1
50000 CAPSULE ORAL WEEKLY
Qty: 12 CAPSULE | Refills: 1 | Status: SHIPPED | OUTPATIENT
Start: 2022-10-19

## 2022-10-27 ENCOUNTER — TELEPHONE (OUTPATIENT)
Dept: ORTHOPEDIC SURGERY | Age: 57
End: 2022-10-27

## 2022-10-27 DIAGNOSIS — S32.008D OTHER CLOSED FRACTURE OF LUMBAR VERTEBRA WITH ROUTINE HEALING, UNSPECIFIED LUMBAR VERTEBRAL LEVEL, SUBSEQUENT ENCOUNTER: Primary | ICD-10-CM

## 2022-10-27 RX ORDER — OXYCODONE HYDROCHLORIDE AND ACETAMINOPHEN 5; 325 MG/1; MG/1
1 TABLET ORAL EVERY 4 HOURS PRN
Qty: 28 TABLET | Refills: 0 | Status: SHIPPED | OUTPATIENT
Start: 2022-10-27 | End: 2022-11-03

## 2022-10-27 NOTE — TELEPHONE ENCOUNTER
Prescription Refill     Medication Name:  [address]: [facility] [place]  Patient Contact Number: [phone]  Christina Moulton

## 2022-10-27 NOTE — TELEPHONE ENCOUNTER
Per Raisa Toussaint, ankle pumps are used in a therapy situation. Compression stockings may be the answer.

## 2022-10-28 ENCOUNTER — TELEMEDICINE (OUTPATIENT)
Dept: FAMILY MEDICINE CLINIC | Age: 57
End: 2022-10-28
Payer: COMMERCIAL

## 2022-10-28 DIAGNOSIS — S32.009A CLOSED FRACTURE OF TRANSVERSE PROCESS OF LUMBAR VERTEBRA, INITIAL ENCOUNTER (HCC): Primary | ICD-10-CM

## 2022-10-28 DIAGNOSIS — R20.2 LEG PARESTHESIA: ICD-10-CM

## 2022-10-28 PROCEDURE — 99213 OFFICE O/P EST LOW 20 MIN: CPT | Performed by: NURSE PRACTITIONER

## 2022-10-28 ASSESSMENT — ENCOUNTER SYMPTOMS
GASTROINTESTINAL NEGATIVE: 1
BACK PAIN: 1

## 2022-10-28 NOTE — PROGRESS NOTES
10/28/2022    TELEHEALTH EVALUATION -- Audio/Visual (During SESUE-94 public health emergency)    HPI:    Kristopher Ramírez (:  1965) has requested an audio/video evaluation for the following concern(s):    Chief Complaint   Patient presents with    Fall     Follow up         Santos Darby seen today for follow-up on his back injury and vertebral fracture. He states his back is feeling significantly better especially with the addition of the support brace. He was originally wearing a support brace . Is at that time that he noticed having lower leg swelling in his ankles and his calfs. He since takes the brace off for 1 to 2 hours at a time while he is resting and the swelling has gone down. He has had a rare episode of radicular symptoms into his left leg and only once into his groin. Those lasted only for a few minute and then resolved. He is able to walk cane with improved pain. He has other additional falls. He has an appointment scheduled on  with orthopedic surgeon. Overall he is feeling much better yet he is try he is active as possible. Review of Systems   Constitutional:  Positive for appetite change. HENT: Negative. Cardiovascular:  Positive for leg swelling. Gastrointestinal: Negative. Musculoskeletal:  Positive for back pain, gait problem (Attributed the pain but stable with cane) and myalgias. Skin: Negative. Neurological:  Negative for weakness and numbness. Prior to Visit Medications    Medication Sig Taking? Authorizing Provider   oxyCODONE-acetaminophen (PERCOCET) 5-325 MG per tablet Take 1 tablet by mouth every 4 hours as needed for Pain for up to 7 days.  Yes CASSIE Avila   vitamin D (ERGOCALCIFEROL) 1.25 MG (12969 UT) CAPS capsule Take 1 capsule by mouth once a week Yes PARVEZ Gee - CNP   sennosides-docusate sodium (SENOKOT-S) 8.6-50 MG tablet Take 2 tablets by mouth daily Yes CASSIE Avila   methocarbamol (ROBAXIN) 500 MG tablet Take 1 tablet by mouth 3 times daily Yes PARVEZ Lim CNP   losartan-hydroCHLOROthiazide (HYZAAR) 50-12.5 MG per tablet Take 1 tablet by mouth daily Yes PARVEZ Lim CNP   colestipol (COLESTID) 1 g tablet TAKE 1 TABLET BY MOUTH TWICE A DAY Yes PARVEZ Britton CNP   amLODIPine (NORVASC) 5 MG tablet TAKE 1 TABLET BY MOUTH EVERY DAY Yes PARVEZ Britton CNP   ondansetron (ZOFRAN) 4 MG tablet Take 1 tablet by mouth 3 times daily as needed for Nausea or Vomiting Yes PARVEZ Lim CNP   pantoprazole (PROTONIX) 20 MG tablet Take 1 tablet by mouth 2 times daily (before meals) Yes PARVEZ Britton CNP   fluticasone (FLONASE) 50 MCG/ACT nasal spray SHAKE LIQUID AND USE 2 SPRAYS IN EACH NOSTRIL DAILY Yes PARVEZ Britton CNP   amitriptyline (ELAVIL) 50 MG tablet Take 1 tablet by mouth nightly Yes PARVEZ Hall CNP   Misc.  Devices (WALKER) MISC 1 each by Does not apply route once for 1 dose  PARVEZ Lim CNP       Social History     Tobacco Use    Smoking status: Never    Smokeless tobacco: Never   Substance Use Topics    Alcohol use: Not Currently     Comment: occasional    Drug use: Never        Allergies   Allergen Reactions    Penicillins     Prednisone Nausea And Vomiting   ,   Past Medical History:   Diagnosis Date    [facility]    GERD (gastroesophageal reflux disease)     resolved    Kidney stones     Malignant neoplasm of ascending colon ([facility] [address] 4/9/2019    Uncontrolled hypertension 7/19/2019   ,   Past Surgical History:   Procedure Laterality Date    APPENDECTOMY      COLON SURGERY      I & D of colon abscess [post op    COLONOSCOPY N/A 4/4/2019    COLONOSCOPY WITH BIOPSY performed by Gayle Bolton MD at [address] [facility]  4/4/2019    COLONOSCOPY POLYPECTOMY SNARE/COLD BIOPSY performed by Gayle Bolton MD at [address] [place] [address] COLONOSCOPY N/A 6/12/2020    COLONOSCOPY POLYPECTOMY SNARE performed by Delma Rodríguez MD at [address] 4/16/2019    ROBOTIC LAPAROSCOPIC RIGHT COLECTOMY CPT CODE - 60407 performed by Aditi Mendoza MD at [address] N/A 5/31/2022    [address]OBOTIC [address] performed by Aditi Mendoza MD at [address] [facility] 4/4/2019    EGD performed by Brown Reyes MD at [address] [facility]:  Patient-Reported Vitals 10/27/2022   Patient-Reported Weight 198   Patient-Reported Height 5'9\"   Patient-Reported Systolic 088   Patient-Reported Diastolic 85   Patient-Reported Pulse 69   Patient-Reported Temperature 97.5   Patient-Reported SpO2 I do not have one. Patient-Reported Peak Flow N/A         Physical Exam  Constitutional:       Appearance: Normal appearance. HENT:      Head: Normocephalic and atraumatic. Right Ear: External ear normal.      Left Ear: External ear normal.      Nose: Nose normal. No rhinorrhea. Mouth/Throat:      Pharynx: Oropharynx is clear. Eyes:      General:         Right eye: No discharge. Left eye: No discharge. Conjunctiva/sclera: Conjunctivae normal.   Neck:      Trachea: Phonation normal.   Pulmonary:      Effort: Pulmonary effort is normal. No respiratory distress. Musculoskeletal:      Cervical back: Normal range of motion. Skin:     Coloration: Skin is not jaundiced or pale. Neurological:      General: No focal deficit present. Mental Status: He is alert and oriented to person, place, and time. Psychiatric:         Mood and Affect: Mood normal.         Behavior: Behavior normal.         Thought Content: Thought content normal.         Judgment: Judgment normal.         ASSESSMENT/PLAN:    ICD-10-CM    1. Closed fracture of transverse process of lumbar vertebra, [facility]  S32.009A       2.  Leg paresthesia  R20.2         Back is improving, leg paresthesias is common  Discussed tapering off narcotics and trial of NSAIDs for pain management first but still taking the narcotics if needed  Continue Robaxin  Keep scheduled appointment November 9 with specialist  Any new or worsening symptoms  Follow-up after specialist visit    No orders of the defined types were placed in this encounter. No orders of the defined types were placed in this encounter. Return in about 4 weeks (around 11/25/2022) for joint/back pain follow-up. Tyler Diaz is a 64 y.o. male  was evaluated through a synchronous (real-time) audio-video encounter. The patient (or guardian if applicable) is aware that this is a billable service, which includes applicable co-pays. This Virtual Visit was conducted with patient's (and/or legal guardian's) consent. The visit was conducted pursuant to the emergency declaration under the [address] [place] [address], [address] [place] [address] authority and the [employer] General Act. Patient identification was verified, and a caregiver was present when appropriate. The patient was located in a state where the provider was licensed to provide care. Patient identification was verified at the start of the visit: Yes    Total time spent on this encounter: Not billed by time    Services were provided through a video synchronous discussion virtually to substitute for in-person clinic visit. Patient and provider were located at their individual homes. --PARVEZ Agee - CNP on 10/28/2022 at 4:14 PM    An electronic signature was used to authenticate this note.

## 2022-11-04 ENCOUNTER — TELEPHONE (OUTPATIENT)
Dept: ORTHOPEDIC SURGERY | Age: 57
End: 2022-11-04

## 2022-11-04 DIAGNOSIS — S32.009D CLOSED FRACTURE OF LUMBAR VERTEBRA WITH ROUTINE HEALING, UNSPECIFIED FRACTURE MORPHOLOGY, UNSPECIFIED LUMBAR VERTEBRAL LEVEL, SUBSEQUENT ENCOUNTER: Primary | ICD-10-CM

## 2022-11-04 RX ORDER — OXYCODONE HYDROCHLORIDE AND ACETAMINOPHEN 5; 325 MG/1; MG/1
1 TABLET ORAL EVERY 4 HOURS PRN
Qty: 28 TABLET | Refills: 0 | Status: SHIPPED | OUTPATIENT
Start: 2022-11-04 | End: 2022-11-10

## 2022-11-04 NOTE — TELEPHONE ENCOUNTER
Prescription Refill     Medication Name:  OXYCODONE  Pharmacy: [facility] [place]  Patient Contact Number:  [phone]    PT STATES HE PUT IN REQ FOR PAIN RX REFILL AND HAS NOT RECVD AN ANSWER ON WHETHER IT HAS BEEN CALLED IN. PT REQ RX BE SENT TO [facility] ON [address] FOR . HE IS OUT OF PILLS.  CONTACT PT TO ADVISE WHEN REFILL ORDER IS SENT

## 2022-11-07 RX ORDER — METHOCARBAMOL 500 MG/1
TABLET, FILM COATED ORAL
Qty: 90 TABLET | Refills: 0 | Status: SHIPPED | OUTPATIENT
Start: 2022-11-07

## 2022-11-07 NOTE — TELEPHONE ENCOUNTER
10/28/2022    Future Appointments   Date Time Provider Leonila Dee   11/9/2022 10:40 AM Wayne Mueller MD AND ORTHO [facility]   12/20/2022  2:00 PM Maynor Ledbetter MD AND NEURO Neurology -

## 2022-11-09 ENCOUNTER — OFFICE VISIT (OUTPATIENT)
Dept: ORTHOPEDIC SURGERY | Age: 57
End: 2022-11-09
Payer: COMMERCIAL

## 2022-11-09 VITALS — BODY MASS INDEX: 29.62 KG/M2 | WEIGHT: 200 LBS | HEIGHT: 69 IN

## 2022-11-09 DIAGNOSIS — S32.009A CLOSED FRACTURE OF TRANSVERSE PROCESS OF LUMBAR VERTEBRA, INITIAL ENCOUNTER (HCC): Primary | ICD-10-CM

## 2022-11-09 DIAGNOSIS — S32.009D CLOSED FRACTURE OF LUMBAR VERTEBRA WITH ROUTINE HEALING, UNSPECIFIED FRACTURE MORPHOLOGY, UNSPECIFIED LUMBAR VERTEBRAL LEVEL, SUBSEQUENT ENCOUNTER: ICD-10-CM

## 2022-11-09 PROCEDURE — 99214 OFFICE O/P EST MOD 30 MIN: CPT | Performed by: PHYSICIAN ASSISTANT

## 2022-11-09 RX ORDER — OXYCODONE HYDROCHLORIDE AND ACETAMINOPHEN 5; 325 MG/1; MG/1
1 TABLET ORAL EVERY 4 HOURS PRN
Qty: 28 TABLET | Refills: 0 | OUTPATIENT
Start: 2022-11-09 | End: 2022-11-16

## 2022-11-09 NOTE — PROGRESS NOTES
New Patient: LUMBAR SPINE    Referring Provider:  CASSIE Gonzalez    CHIEF COMPLAINT:    Chief Complaint   Patient presents with    Lower Back Pain     L1-L3 transverse process FX       HISTORY OF PRESENT ILLNESS:     Mr. Raymond Guadalupe is a pleasant 64 y.o. male here for consultation regarding his L1-L3 transverse process fractures. He states his pain began after a fall 1 month ago. He fell down the steps and hit is back on the stairs. He was evaluated in the ED and noted to have lumbar transverse process fractures. His pain has steadily improved since then. He describes the pain as sharp initially, now as aching with transitioning and bending. He rates his back pain 6/10 and leg pain 0/10. His pain is noted in his right lumbar spine. He denies lower extremity radicular pain, numbness or weakness. He denies saddle numbness or bowel or bladder dysfunction. The pain occasionally disrupts his sleep. He has tried a brace, rest and pain medication. He notes his pain is significantly improved from initial injury.     Current/Past Treatment:   Physical Therapy: No  Chiropractic:  No   Injection:  No   Medications:  Robaxin, Percocet     Past Medical History:   Past Medical History:   Diagnosis Date    Colon cancer ([facility] [address] 04/2019    GERD (gastroesophageal reflux disease)     resolved    Kidney stones     Malignant neoplasm of ascending colon ([facility] [address] 4/9/2019    Uncontrolled hypertension 7/19/2019        Past Surgical History:     Past Surgical History:   Procedure Laterality Date    APPENDECTOMY      COLON SURGERY      I & D of colon abscess [post op    COLONOSCOPY N/A 4/4/2019    COLONOSCOPY WITH BIOPSY performed by Silvana Cohen MD at [facility]  4/4/2019    COLONOSCOPY POLYPECTOMY SNARE/COLD BIOPSY performed by Silvana Cohen MD at [facility] 6/12/2020    COLONOSCOPY POLYPECTOMY SNARE performed by Elva Silver MD at [address] [place] [address] HEMICOLECTOMY Right 4/16/2019    ROBOTIC LAPAROSCOPIC RIGHT COLECTOMY CPT CODE - 39509 performed by Kalpesh Black MD at [facility] [address] 5/31/2022    ROBOTIC [address] [place] performed by Kalpesh Black MD at [id number] [place] [address] 4/4/2019    EGD performed by Gayle Bolton MD at [facility]       Current Medications:     Current Outpatient Medications:     methocarbamol (ROBAXIN) 500 MG tablet, TAKE 1 TABLET BY MOUTH THREE TIMES A DAY, Disp: 90 tablet, Rfl: 0    oxyCODONE-acetaminophen (PERCOCET) 5-325 MG per tablet, Take 1 tablet by mouth every 4 hours as needed for Pain for up to 7 days. , Disp: 28 tablet, Rfl: 0    vitamin D (ERGOCALCIFEROL) 1.25 MG (21029 UT) CAPS capsule, Take 1 capsule by mouth once a week, Disp: 12 capsule, Rfl: 1    sennosides-docusate sodium (SENOKOT-S) 8.6-50 MG tablet, Take 2 tablets by mouth daily, Disp: 30 tablet, Rfl: 1    Misc. Devices (WALKER) MISC, 1 each by Does not apply route once for 1 dose, Disp: 1 each, Rfl: 0    losartan-hydroCHLOROthiazide (HYZAAR) 50-12.5 MG per tablet, Take 1 tablet by mouth daily, Disp: 90 tablet, Rfl: 1    colestipol (COLESTID) 1 g tablet, TAKE 1 TABLET BY MOUTH TWICE A DAY, Disp: 60 tablet, Rfl: 3    amLODIPine (NORVASC) 5 MG tablet, TAKE 1 TABLET BY MOUTH EVERY DAY, Disp: 90 tablet, Rfl: 0    ondansetron (ZOFRAN) 4 MG tablet, Take 1 tablet by mouth 3 times daily as needed for Nausea or Vomiting, Disp: 10 tablet, Rfl: 0    pantoprazole (PROTONIX) 20 MG tablet, Take 1 tablet by mouth 2 times daily (before meals), Disp: 180 tablet, Rfl: 1    fluticasone (FLONASE) 50 MCG/ACT nasal spray, SHAKE LIQUID AND USE 2 SPRAYS IN EACH NOSTRIL DAILY, Disp: 16 g, Rfl: 2    amitriptyline (ELAVIL) 50 MG tablet, Take 1 tablet by mouth nightly, Disp: 30 tablet, Rfl: 11    Allergies:  Penicillins and Prednisone    Social History:    reports that he has never smoked.  He has never used smokeless tobacco. He reports that he does not currently use alcohol. He reports that he does not use drugs. Family History:   Family History   Problem Relation Age of Onset    Other Mother         diverticulitis    Diabetes Father     Cancer Father         bladder  w mets       REVIEW OF SYSTEMS: Full ROS noted & scanned   CONSTITUTIONAL: Denies unexplained weight loss, fevers, chills or fatigue  NEUROLOGICAL: Denies unsteady gait or progressive weakness  MUSCULOSKELETAL: Denies joint swelling or redness  PSYCHOLOGICAL: Denies anxiety, depression   SKIN: Denies skin changes, delayed healing, rash, itching   HEMATOLOGIC: Denies easy bleeding or bruising  ENDOCRINE: Denies excessive thirst, urination, heat/cold  RESPIRATORY: Denies current dyspnea, cough  GI: Denies nausea, vomiting, diarrhea   : Denies bowel or bladder issues      PHYSICAL EXAM:    Vitals: Height 5' 9\" (1.753 m), weight 200 lb (90.7 kg). GENERAL EXAM:  General Apparence: Patient is adequately groomed with no evidence of malnutrition. Orientation: The patient is oriented to time, place and person. Mood & Affect:The patient's mood and affect are appropriate. Vascular: Examination reveals no swelling tenderness in upper or lower extremities. Good capillary refill. Lymphatic: The lymphatic examination bilaterally reveals all areas to be without enlargement or induration  Sensation: Sensation is intact without deficit  Coordination/Balance: Good coordination. Tandem walking normal.     LUMBAR/SACRAL EXAMINATION:  Inspection: Local inspection shows no step-off or bruising. Lumbar alignment is normal.  Sagittal and Coronal balance is neutral.      Palpation:   No evidence of tenderness at the midline. No tenderness bilaterally at the paraspinal or trochanters. There is no step-off or paraspinal spasm. Range of Motion: Lumbar flexion, extension and rotation are mildly limited due to pain.   Strength:   Strength testing is 5/5 in all muscle groups tested. Special Tests:   Straight leg raise and crossed SLR negative. Leg length and pelvis level. Skin: There are no rashes, ulcerations or lesions. Reflexes: Reflexes are symmetrically 2+ at the patellar and ankle tendons. Clonus absent bilaterally at the feet. Gait & station: normal, patient ambulates without assistance    Additional Examinations:   RIGHT LOWER EXTREMITY: Inspection/examination of the right lower extremity does not show any tenderness, deformity or injury. Range of motion is unremarkable. There is no gross instability. There are no rashes, ulcerations or lesions. Strength and tone are normal.  LEFT LOWER EXTREMITY:  Inspection/examination of the left lower extremity does not show any tenderness, deformity or injury. Range of motion is unremarkable. There is no gross instability. There are no rashes, ulcerations or lesions. Strength and tone are normal.    Diagnostic Testing:    I reviewed CT images of his lumbar spine from 10/10/22. They show acute nondisplaced fractures of the right L1-L3 transverse processes. No vertebral fractures noted. Impression:   Right L1-L3 transverse process fractures    Plan:    We discussed treatment options including observation, physical therapy, and additional imaging. He wishes to proceed with observation and his brace. We discussed that transverse process fractures typically heal with conservative treatment in 4-6 weeks. He will return or call for physical therapy if his symptoms do not continue to improve over the next few weeks. Patient examined and note dictated by Kai Livingston PA-C. Patient also seen and examined by Dr. Marni Aguilar.

## 2022-11-10 ENCOUNTER — TELEPHONE (OUTPATIENT)
Dept: ORTHOPEDIC SURGERY | Age: 57
End: 2022-11-10

## 2022-11-10 DIAGNOSIS — S32.008D OTHER CLOSED FRACTURE OF LUMBAR VERTEBRA WITH ROUTINE HEALING, UNSPECIFIED LUMBAR VERTEBRAL LEVEL, SUBSEQUENT ENCOUNTER: Primary | ICD-10-CM

## 2022-11-10 RX ORDER — OXYCODONE HYDROCHLORIDE AND ACETAMINOPHEN 5; 325 MG/1; MG/1
1 TABLET ORAL EVERY 4 HOURS PRN
Qty: 28 TABLET | Refills: 0 | Status: SHIPPED | OUTPATIENT
Start: 2022-11-10 | End: 2022-11-17

## 2022-11-10 RX ORDER — OXYCODONE HYDROCHLORIDE AND ACETAMINOPHEN 5; 325 MG/1; MG/1
1 TABLET ORAL EVERY 4 HOURS PRN
Qty: 28 TABLET | Refills: 0 | Status: SHIPPED | OUTPATIENT
Start: 2022-11-10 | End: 2022-11-10

## 2022-11-10 NOTE — TELEPHONE ENCOUNTER
Unfortunately cannot refill pain medication any sooner. Ideally should try to start weaning off pain medication after getting script that was sent today as he is 1 month out from fracture and pain improving.

## 2022-11-10 NOTE — TELEPHONE ENCOUNTER
Prescription Refill     Medication Name:  Sivakumar Leone  Pharmacy: [address]   Patient Contact Number:  [phone]

## 2022-11-10 NOTE — TELEPHONE ENCOUNTER
[facility] [place] has the script  It is on hold because to soon to fill. Pharmacist stated informed patient of this. He can pick it up as soon as they open tomorrow.     Patient ask that I send message to Dr [facility] staff to see if he can send in something for his pain

## 2022-11-10 NOTE — TELEPHONE ENCOUNTER
PATIENT STATES HIS PRESCRIPTION WAS SENT TO 2 DIFFERENT PHARMACY. PATIENT IS NEEDING FOR IT TO BE ONLY AT [facility] IN [place]. PLEASE ADVISE.

## 2022-11-17 ENCOUNTER — TELEPHONE (OUTPATIENT)
Dept: ORTHOPEDIC SURGERY | Age: 57
End: 2022-11-17

## 2022-11-17 NOTE — TELEPHONE ENCOUNTER
Prescription Refill     Medication Name:  OXYCODONE  Pharmacy: [facility], [facility]  Patient Contact Number:  [phone]

## 2022-12-07 NOTE — TELEPHONE ENCOUNTER
Future Appointments   Date Time Provider Leonila Price   12/20/2022  2:00 PM [address]  [facility] Neurology -     LOV 10/28/2022

## 2022-12-13 RX ORDER — METHOCARBAMOL 500 MG/1
TABLET, FILM COATED ORAL
Qty: 90 TABLET | Refills: 2 | Status: SHIPPED | OUTPATIENT
Start: 2022-12-13

## 2022-12-21 DIAGNOSIS — K21.00 GASTROESOPHAGEAL REFLUX DISEASE WITH ESOPHAGITIS WITHOUT HEMORRHAGE: ICD-10-CM

## 2022-12-21 RX ORDER — AMITRIPTYLINE HYDROCHLORIDE 50 MG/1
50 TABLET, FILM COATED ORAL NIGHTLY
Qty: 30 TABLET | Refills: 0 | Status: SHIPPED | OUTPATIENT
Start: 2022-12-21 | End: 2023-01-20

## 2022-12-21 NOTE — TELEPHONE ENCOUNTER
Future Appointments   Date Time Provider Leonila Dee   1/24/2023  3:10 PM Danna Oliver MD  NEURO Neurology -     LOV 1/18/2022    LOV video was 10/28/22

## 2023-01-02 DIAGNOSIS — I10 HYPERTENSION, UNSPECIFIED TYPE: ICD-10-CM

## 2023-01-03 NOTE — TELEPHONE ENCOUNTER
LOV 10/28/2022    Future Appointments   Date Time Provider Leonila Price   1/24/2023  3:10 PM Kathe Aguilera MD [facility] Neurology -

## 2023-01-04 DIAGNOSIS — I10 HYPERTENSION, UNSPECIFIED TYPE: ICD-10-CM

## 2023-01-04 RX ORDER — LOSARTAN POTASSIUM AND HYDROCHLOROTHIAZIDE 12.5; 5 MG/1; MG/1
1 TABLET ORAL DAILY
Qty: 90 TABLET | Refills: 1 | Status: SHIPPED | OUTPATIENT
Start: 2023-01-04

## 2023-01-06 RX ORDER — LOSARTAN POTASSIUM AND HYDROCHLOROTHIAZIDE 12.5; 5 MG/1; MG/1
TABLET ORAL
Qty: 90 TABLET | Refills: 1 | OUTPATIENT
Start: 2023-01-06

## 2023-01-16 DIAGNOSIS — K21.00 GASTROESOPHAGEAL REFLUX DISEASE WITH ESOPHAGITIS WITHOUT HEMORRHAGE: ICD-10-CM

## 2023-01-16 RX ORDER — AMITRIPTYLINE HYDROCHLORIDE 50 MG/1
50 TABLET, FILM COATED ORAL NIGHTLY
Qty: 30 TABLET | Refills: 0 | Status: SHIPPED | OUTPATIENT
Start: 2023-01-16 | End: 2023-02-15

## 2023-01-16 RX ORDER — MONTELUKAST SODIUM 4 MG/1
TABLET, CHEWABLE ORAL
Qty: 60 TABLET | Refills: 3 | Status: SHIPPED | OUTPATIENT
Start: 2023-01-16

## 2023-01-16 NOTE — TELEPHONE ENCOUNTER
1/18/2022    Future Appointments   Date Time Provider Leonila Leonardi   1/24/2023  3:10 PM MD Koki Martins [address] [place] [address] Neurology -

## 2023-01-16 NOTE — TELEPHONE ENCOUNTER
1/18/2022    Future Appointments   Date Time Provider Leonila Leonardi   1/24/2023  3:10 PM MD Koki Martins [address] Neurology -

## 2023-02-08 RX ORDER — AMLODIPINE BESYLATE 5 MG/1
TABLET ORAL
Qty: 90 TABLET | Refills: 0 | Status: SHIPPED | OUTPATIENT
Start: 2023-02-08

## 2023-03-02 DIAGNOSIS — K21.00 GASTROESOPHAGEAL REFLUX DISEASE WITH ESOPHAGITIS WITHOUT HEMORRHAGE: ICD-10-CM

## 2023-03-03 DIAGNOSIS — R77.8 LOW SERUM COMPLEMENT FACTOR D: Primary | ICD-10-CM

## 2023-03-03 RX ORDER — AMITRIPTYLINE HYDROCHLORIDE 50 MG/1
50 TABLET, FILM COATED ORAL NIGHTLY
Qty: 90 TABLET | Refills: 1 | OUTPATIENT
Start: 2023-03-03 | End: 2023-04-02

## 2023-03-06 DIAGNOSIS — I10 HYPERTENSION, UNSPECIFIED TYPE: ICD-10-CM

## 2023-03-07 RX ORDER — LOSARTAN POTASSIUM AND HYDROCHLOROTHIAZIDE 12.5; 5 MG/1; MG/1
TABLET ORAL
Qty: 90 TABLET | Refills: 1 | Status: SHIPPED | OUTPATIENT
Start: 2023-03-07

## 2023-03-10 DIAGNOSIS — R77.8 LOW SERUM COMPLEMENT FACTOR D: ICD-10-CM

## 2023-03-11 LAB — VITAMIN D 25-HYDROXY: 36 NG/ML

## 2023-03-13 DIAGNOSIS — K21.00 GASTROESOPHAGEAL REFLUX DISEASE WITH ESOPHAGITIS WITHOUT HEMORRHAGE: ICD-10-CM

## 2023-03-13 RX ORDER — AMITRIPTYLINE HYDROCHLORIDE 50 MG/1
50 TABLET, FILM COATED ORAL NIGHTLY
Qty: 30 TABLET | Refills: 0 | Status: SHIPPED | OUTPATIENT
Start: 2023-03-13 | End: 2023-04-12

## 2023-03-22 ENCOUNTER — OFFICE VISIT (OUTPATIENT)
Dept: FAMILY MEDICINE CLINIC | Age: 58
End: 2023-03-22
Payer: COMMERCIAL

## 2023-03-22 VITALS
RESPIRATION RATE: 16 BRPM | HEART RATE: 86 BPM | TEMPERATURE: 97.9 F | WEIGHT: 203 LBS | SYSTOLIC BLOOD PRESSURE: 122 MMHG | DIASTOLIC BLOOD PRESSURE: 80 MMHG | BODY MASS INDEX: 29.98 KG/M2 | OXYGEN SATURATION: 97 %

## 2023-03-22 DIAGNOSIS — I10 HYPERTENSION, UNSPECIFIED TYPE: Primary | ICD-10-CM

## 2023-03-22 PROCEDURE — 99213 OFFICE O/P EST LOW 20 MIN: CPT | Performed by: NURSE PRACTITIONER

## 2023-03-22 PROCEDURE — 3074F SYST BP LT 130 MM HG: CPT | Performed by: NURSE PRACTITIONER

## 2023-03-22 PROCEDURE — 3078F DIAST BP <80 MM HG: CPT | Performed by: NURSE PRACTITIONER

## 2023-03-22 RX ORDER — AMLODIPINE BESYLATE 5 MG/1
5 TABLET ORAL DAILY
Qty: 90 TABLET | Refills: 0 | Status: SHIPPED | OUTPATIENT
Start: 2023-03-22 | End: 2023-04-21

## 2023-03-22 SDOH — ECONOMIC STABILITY: FOOD INSECURITY: WITHIN THE PAST 12 MONTHS, THE FOOD YOU BOUGHT JUST DIDN'T LAST AND YOU DIDN'T HAVE MONEY TO GET MORE.: NEVER TRUE

## 2023-03-22 SDOH — ECONOMIC STABILITY: INCOME INSECURITY: HOW HARD IS IT FOR YOU TO PAY FOR THE VERY BASICS LIKE FOOD, HOUSING, MEDICAL CARE, AND HEATING?: NOT HARD AT ALL

## 2023-03-22 SDOH — ECONOMIC STABILITY: HOUSING INSECURITY
IN THE LAST 12 MONTHS, WAS THERE A TIME WHEN YOU DID NOT HAVE A STEADY PLACE TO SLEEP OR SLEPT IN A SHELTER (INCLUDING NOW)?: NO

## 2023-03-22 SDOH — ECONOMIC STABILITY: FOOD INSECURITY: WITHIN THE PAST 12 MONTHS, YOU WORRIED THAT YOUR FOOD WOULD RUN OUT BEFORE YOU GOT MONEY TO BUY MORE.: NEVER TRUE

## 2023-03-22 ASSESSMENT — PATIENT HEALTH QUESTIONNAIRE - PHQ9
2. FEELING DOWN, DEPRESSED OR HOPELESS: 0
SUM OF ALL RESPONSES TO PHQ QUESTIONS 1-9: 0
SUM OF ALL RESPONSES TO PHQ9 QUESTIONS 1 & 2: 0
1. LITTLE INTEREST OR PLEASURE IN DOING THINGS: 0

## 2023-03-22 NOTE — PROGRESS NOTES
Subjective:      Chief Complaint   Patient presents with    Hypertension       Patient ID: Shannan Pablo is a 62 y.o. male who presents for follow-up. Patient's blood pressure 128/80, doing very well with Hyzaar and amlodipine. Denies any chest pain or shortness of breath. Explained to patient that he should be seen at least every 6 months with a stable blood pressure. HPI as above    Family History   Problem Relation Age of Onset    Other Mother         diverticulitis    Diabetes Father     Cancer Father         bladder  w mets       Social History     Socioeconomic History    Marital status:      Spouse name: Not on file    Number of children: Not on file    Years of education: Not on file    Highest education level: Not on file   Occupational History    Not on file   Tobacco Use    Smoking status: Never    Smokeless tobacco: Never   Vaping Use    Vaping Use: Not on file   Substance and Sexual Activity    Alcohol use: Not Currently     Comment: occasional    Drug use: Never    Sexual activity: Not on file   Other Topics Concern    Not on file   Social History Narrative    Not on file     Social Determinants of Health     Financial Resource Strain: Low Risk     Difficulty of Paying Living Expenses: Not hard at all   Food Insecurity: No Food Insecurity    Worried About Running Out of Food in the Last Year: Never true    [address] in the Last Year: Never true   Transportation Needs: Unknown    Lack of Transportation (Medical): Not on file    Lack of Transportation (Non-Medical):  No   Physical Activity: Not on file   Stress: Not on file   Social Connections: Not on file   Intimate Partner Violence: Not on file   Housing Stability: Unknown    Unable to Pay for Housing in the Last Year: Not on file    Number of Places Lived in the Last Year: Not on file    Unstable Housing in the Last Year: No       Current Outpatient Medications on File Prior to Visit   Medication Sig Dispense Refill

## 2023-03-24 ASSESSMENT — ENCOUNTER SYMPTOMS
CONSTIPATION: 1
WHEEZING: 0
BACK PAIN: 1
SHORTNESS OF BREATH: 0

## 2023-04-04 DIAGNOSIS — K21.00 GASTROESOPHAGEAL REFLUX DISEASE WITH ESOPHAGITIS WITHOUT HEMORRHAGE: ICD-10-CM

## 2023-04-04 RX ORDER — AMITRIPTYLINE HYDROCHLORIDE 50 MG/1
50 TABLET, FILM COATED ORAL NIGHTLY
Qty: 30 TABLET | Refills: 0 | Status: SHIPPED | OUTPATIENT
Start: 2023-04-04 | End: 2023-05-04

## 2023-04-18 RX ORDER — MONTELUKAST SODIUM 4 MG/1
TABLET, CHEWABLE ORAL
Qty: 180 TABLET | Refills: 0 | Status: SHIPPED | OUTPATIENT
Start: 2023-04-18

## 2023-04-19 ENCOUNTER — OFFICE VISIT (OUTPATIENT)
Dept: FAMILY MEDICINE CLINIC | Age: 58
End: 2023-04-19
Payer: COMMERCIAL

## 2023-04-19 ENCOUNTER — HOSPITAL ENCOUNTER (OUTPATIENT)
Dept: GENERAL RADIOLOGY | Age: 58
Discharge: HOME | End: 2023-04-19
Payer: COMMERCIAL

## 2023-04-19 VITALS
OXYGEN SATURATION: 97 % | HEART RATE: 92 BPM | BODY MASS INDEX: 29.39 KG/M2 | SYSTOLIC BLOOD PRESSURE: 120 MMHG | DIASTOLIC BLOOD PRESSURE: 82 MMHG | WEIGHT: 199 LBS | RESPIRATION RATE: 18 BRPM

## 2023-04-19 DIAGNOSIS — R07.9 CHEST PAIN, UNSPECIFIED TYPE: Primary | ICD-10-CM

## 2023-04-19 DIAGNOSIS — R07.9 CHEST PAIN, UNSPECIFIED TYPE: ICD-10-CM

## 2023-04-19 DIAGNOSIS — M79.18 RHOMBOID PAIN: ICD-10-CM

## 2023-04-19 PROCEDURE — 3074F SYST BP LT 130 MM HG: CPT | Performed by: NURSE PRACTITIONER

## 2023-04-19 PROCEDURE — 99214 OFFICE O/P EST MOD 30 MIN: CPT | Performed by: NURSE PRACTITIONER

## 2023-04-19 PROCEDURE — 71046 X-RAY EXAM CHEST 2 VIEWS: CPT

## 2023-04-19 PROCEDURE — 3079F DIAST BP 80-89 MM HG: CPT | Performed by: NURSE PRACTITIONER

## 2023-04-19 RX ORDER — ERGOCALCIFEROL 1.25 MG/1
CAPSULE ORAL
Qty: 12 CAPSULE | Refills: 1 | Status: SHIPPED | OUTPATIENT
Start: 2023-04-19

## 2023-04-19 ASSESSMENT — ENCOUNTER SYMPTOMS
SHORTNESS OF BREATH: 0
CHEST TIGHTNESS: 1
BACK PAIN: 1

## 2023-04-19 NOTE — PROGRESS NOTES
4/19/2023    This is a 62 y.o. male   Chief Complaint   Patient presents with    Back Pain     Upper right side under right shoulder blade. States it feels like it is in his lung. Going on for awhile. Tonie Pepper is seen today for evaluation of left upper back pain primarily underneath his left scapula. It is worse when he is sitting in his desk and typing for a long period of time. It is described as a pressure sensation deep into his chest.  The pain lasts for approximately 15 minutes and then resolves on its own. He denies any chest pain, shortness of breath or palpitations. He is concerned because of his history of colon cancer that there is something might be going on in his lungs. Patient Active Problem List   Diagnosis    Iron deficiency anemia due to chronic blood loss    Polyp of transverse colon    Colon tumor    Bile acid malabsorption syndrome    Iron deficiency anemia secondary to blood loss (chronic)    Anemia, chronic disease    Malignant neoplasm of ascending colon (HCC)    Colon cancer (HCC)    Carcinoma of ascending colon (HCC)    Moderate malnutrition (HCC)    Postprocedural intraabdominal abscess    Sebaceous cyst    Suspected sleep apnea    Hypertensive disorder    Bilateral nephrolithiasis    Polyp of descending colon    Colon, diverticulosis    Personal history of colon cancer, stage I    Insomnia    History of colectomy    Irritable bowel syndrome with diarrhea    Incisional hernia, without obstruction or gangrene    History of incisional hernia repair    Postoperative hypoxemia    Pulmonary venous congestion    Atelectasis    Overweight (BMI 25.0-29. 9)    Elevated TSH    Postoperative pain       Current Outpatient Medications   Medication Sig Dispense Refill    colestipol (COLESTID) 1 g tablet TAKE 1 TABLET BY MOUTH TWICE A  tablet 0    methocarbamol (ROBAXIN) 500 MG tablet TAKE 1 TABLET BY MOUTH THREE TIMES A DAY 90 tablet 2    amitriptyline (ELAVIL) 50 MG tablet TAKE 1

## 2023-05-09 DIAGNOSIS — K21.00 GASTROESOPHAGEAL REFLUX DISEASE WITH ESOPHAGITIS WITHOUT HEMORRHAGE: ICD-10-CM

## 2023-05-10 RX ORDER — AMITRIPTYLINE HYDROCHLORIDE 50 MG/1
50 TABLET, FILM COATED ORAL NIGHTLY
Qty: 30 TABLET | Refills: 0 | Status: SHIPPED | OUTPATIENT
Start: 2023-05-10 | End: 2023-06-09

## 2023-06-02 DIAGNOSIS — K21.00 GASTROESOPHAGEAL REFLUX DISEASE WITH ESOPHAGITIS WITHOUT HEMORRHAGE: ICD-10-CM

## 2023-06-02 RX ORDER — AMITRIPTYLINE HYDROCHLORIDE 50 MG/1
50 TABLET, FILM COATED ORAL NIGHTLY
Qty: 30 TABLET | Refills: 0 | Status: SHIPPED | OUTPATIENT
Start: 2023-06-02 | End: 2023-07-02

## 2023-06-05 RX ORDER — MONTELUKAST SODIUM 4 MG/1
TABLET, CHEWABLE ORAL
Qty: 180 TABLET | Refills: 0 | Status: SHIPPED | OUTPATIENT
Start: 2023-06-05

## 2023-07-03 NOTE — OP NOTE
Date of Surgery: 5/31/22    Preop Dx: Incisional Hernia     Postop Dx:   Same     Procedure:  Robotic incisional Hernia Repair with Mesh     Surgeon:   Elfego Lara     Assistant:       Anesthesia:   GETA     EBL:    <50ml    Specimen:   none    Complications:  none    Drains/Lines:   none    Indications:   63 yo with enlarging incisional hernia    Description:        Patient was given adequate description of the risks and rewards of the procedure, including bleeding, infection, possible injury to surrounding structures, and possibility of open procedure and freely consented. Patient was given appropriate antibiotics and brought to the OR where GETA anesthesia was induced. Patient was placed in supine position. Prepped and draped in usual sterile fashion. Local anesthetic injected in left upper abdomen abdomen and incision made in epidermis allowing for insertion of 5mm optiview trocar. This was later increased to 8mm. Once it was inserted the abdomen was insufflated with CO2 to 15mmHg pressure. The 30 degree laparoscope was then inserted and peritoneal cavity was inspected. Findings were omentum containing incisional hernias. 8mm trocar inserted in left lower abdomen and 12mm trocar inserted in left midabdomen. Robot then docked. Hernia contents were reduced. Small bridges of fascia between hernia defects were taken down and part of hernia sac removed. Using 0-0 strattafix suture the fascia was closed. Defect measured and [id number] ventralex st echo mesh inserted and positioned. It was secured circumferentially with 2-0 stattafix suture and centrally with 3-0 strattafix suture. The balloon was removed. Under direct visualization the 12mm trocar and 8mm trocar were removed without bleeding seen at their insertion sites. The abdomen was allowed to desufflate and the final trocar was removed. The 12mm trocar site had the fascia closed with 0-0 vicryl figure of eight suture.  Then, all trocar sites had the epidermis reapproximated with 4-0 monocryl subcuticular suture. Sterile dressing placed. All suture, sponge and instrument count correct times two at end of case. Transferred to PACU in stable condition.    Cris Bradford MD PAST SURGICAL HISTORY:  No significant past surgical history

## 2023-07-05 DIAGNOSIS — K21.00 GASTROESOPHAGEAL REFLUX DISEASE WITH ESOPHAGITIS WITHOUT HEMORRHAGE: ICD-10-CM

## 2023-07-05 RX ORDER — AMITRIPTYLINE HYDROCHLORIDE 50 MG/1
50 TABLET, FILM COATED ORAL NIGHTLY
Qty: 30 TABLET | Refills: 0 | Status: SHIPPED | OUTPATIENT
Start: 2023-07-05 | End: 2023-08-04

## 2023-07-28 DIAGNOSIS — K21.00 GASTROESOPHAGEAL REFLUX DISEASE WITH ESOPHAGITIS WITHOUT HEMORRHAGE: ICD-10-CM

## 2023-07-28 RX ORDER — AMITRIPTYLINE HYDROCHLORIDE 50 MG/1
TABLET, FILM COATED ORAL
Qty: 30 TABLET | Refills: 0 | Status: SHIPPED | OUTPATIENT
Start: 2023-07-28

## 2023-08-02 RX ORDER — AMLODIPINE BESYLATE 5 MG/1
TABLET ORAL
Qty: 90 TABLET | Refills: 0 | Status: SHIPPED | OUTPATIENT
Start: 2023-08-02

## 2023-08-28 DIAGNOSIS — K21.00 GASTROESOPHAGEAL REFLUX DISEASE WITH ESOPHAGITIS WITHOUT HEMORRHAGE: ICD-10-CM

## 2023-08-28 RX ORDER — AMITRIPTYLINE HYDROCHLORIDE 50 MG/1
TABLET, FILM COATED ORAL
Qty: 30 TABLET | Refills: 3 | Status: SHIPPED | OUTPATIENT
Start: 2023-08-28

## 2023-09-06 ENCOUNTER — TELEPHONE (OUTPATIENT)
Dept: FAMILY MEDICINE CLINIC | Age: 58
End: 2023-09-06

## 2023-09-06 DIAGNOSIS — K21.00 GASTROESOPHAGEAL REFLUX DISEASE WITH ESOPHAGITIS WITHOUT HEMORRHAGE: ICD-10-CM

## 2023-09-06 NOTE — TELEPHONE ENCOUNTER
[facility] [address] [place] [address] [phone]    4/19/2023     No future appointments.      pantoprazole (PROTONIX) 20 MG tablet

## 2023-09-08 RX ORDER — PANTOPRAZOLE SODIUM 20 MG/1
20 TABLET, DELAYED RELEASE ORAL
Qty: 180 TABLET | Refills: 0 | Status: SHIPPED | OUTPATIENT
Start: 2023-09-08 | End: 2023-12-07

## 2023-10-03 RX ORDER — ERGOCALCIFEROL 1.25 MG/1
CAPSULE ORAL
Qty: 12 CAPSULE | Refills: 0 | Status: SHIPPED | OUTPATIENT
Start: 2023-10-03

## 2023-11-01 RX ORDER — AMLODIPINE BESYLATE 5 MG/1
TABLET ORAL
Qty: 90 TABLET | Refills: 0 | Status: SHIPPED | OUTPATIENT
Start: 2023-11-01

## 2023-11-01 NOTE — TELEPHONE ENCOUNTER
Future Appointments   Date Time Provider [address]   11/10/2023  2:30 PM [facility]         LOV 3/22/2023

## 2023-11-17 ENCOUNTER — HOSPITAL ENCOUNTER (OUTPATIENT)
Dept: CT IMAGING | Age: 58
Discharge: HOME | End: 2023-11-17
Payer: COMMERCIAL

## 2023-11-17 DIAGNOSIS — C18.2 CARCINOMA OF ASCENDING COLON (HCC): ICD-10-CM

## 2023-11-17 PROCEDURE — 74177 CT ABD & PELVIS W/CONTRAST: CPT

## 2023-11-17 PROCEDURE — 6360000004 HC RX CONTRAST MEDICATION: Performed by: INTERNAL MEDICINE

## 2023-11-17 RX ADMIN — DIATRIZOATE MEGLUMINE AND DIATRIZOATE SODIUM 12 ML: 660; 100 LIQUID ORAL; RECTAL at 09:26

## 2023-11-17 RX ADMIN — IOPAMIDOL 75 ML: 755 INJECTION, SOLUTION INTRAVENOUS at 09:26

## 2023-12-05 DIAGNOSIS — K21.00 GASTROESOPHAGEAL REFLUX DISEASE WITH ESOPHAGITIS WITHOUT HEMORRHAGE: ICD-10-CM

## 2023-12-06 RX ORDER — PANTOPRAZOLE SODIUM 20 MG/1
40 TABLET, DELAYED RELEASE ORAL
Qty: 180 TABLET | Refills: 3 | Status: SHIPPED | OUTPATIENT
Start: 2023-12-06

## 2023-12-06 RX ORDER — AMITRIPTYLINE HYDROCHLORIDE 50 MG/1
50 TABLET, FILM COATED ORAL NIGHTLY
Qty: 30 TABLET | Refills: 3 | Status: SHIPPED | OUTPATIENT
Start: 2023-12-06

## 2024-01-17 ASSESSMENT — PATIENT HEALTH QUESTIONNAIRE - PHQ9
SUM OF ALL RESPONSES TO PHQ QUESTIONS 1-9: 0
SUM OF ALL RESPONSES TO PHQ9 QUESTIONS 1 & 2: 0
2. FEELING DOWN, DEPRESSED OR HOPELESS: 0
SUM OF ALL RESPONSES TO PHQ QUESTIONS 1-9: 0
2. FEELING DOWN, DEPRESSED OR HOPELESS: NOT AT ALL
1. LITTLE INTEREST OR PLEASURE IN DOING THINGS: NOT AT ALL
SUM OF ALL RESPONSES TO PHQ9 QUESTIONS 1 & 2: 0
SUM OF ALL RESPONSES TO PHQ QUESTIONS 1-9: 0
SUM OF ALL RESPONSES TO PHQ QUESTIONS 1-9: 0
1. LITTLE INTEREST OR PLEASURE IN DOING THINGS: 0

## 2024-01-18 ENCOUNTER — APPOINTMENT (OUTPATIENT)
Dept: GENERAL RADIOLOGY | Age: 59
End: 2024-01-18
Payer: COMMERCIAL

## 2024-01-18 ENCOUNTER — OFFICE VISIT (OUTPATIENT)
Dept: FAMILY MEDICINE CLINIC | Age: 59
End: 2024-01-18
Payer: COMMERCIAL

## 2024-01-18 ENCOUNTER — APPOINTMENT (OUTPATIENT)
Dept: ULTRASOUND IMAGING | Age: 59
End: 2024-01-18
Payer: COMMERCIAL

## 2024-01-18 ENCOUNTER — HOSPITAL ENCOUNTER (EMERGENCY)
Age: 59
Discharge: HOME | End: 2024-01-18
Payer: COMMERCIAL

## 2024-01-18 ENCOUNTER — APPOINTMENT (OUTPATIENT)
Dept: CT IMAGING | Age: 59
End: 2024-01-18
Payer: COMMERCIAL

## 2024-01-18 VITALS
OXYGEN SATURATION: 100 % | TEMPERATURE: 96.2 F | HEIGHT: 69 IN | WEIGHT: 205 LBS | BODY MASS INDEX: 30.36 KG/M2 | HEART RATE: 80 BPM | SYSTOLIC BLOOD PRESSURE: 143 MMHG | RESPIRATION RATE: 14 BRPM | DIASTOLIC BLOOD PRESSURE: 103 MMHG

## 2024-01-18 VITALS
BODY MASS INDEX: 30.36 KG/M2 | HEART RATE: 104 BPM | WEIGHT: 205 LBS | TEMPERATURE: 97.1 F | RESPIRATION RATE: 16 BRPM | HEIGHT: 69 IN | OXYGEN SATURATION: 97 % | SYSTOLIC BLOOD PRESSURE: 177 MMHG | DIASTOLIC BLOOD PRESSURE: 109 MMHG

## 2024-01-18 DIAGNOSIS — I10 PRIMARY HYPERTENSION: ICD-10-CM

## 2024-01-18 DIAGNOSIS — R10.32 LEFT LOWER QUADRANT ABDOMINAL PAIN: Primary | ICD-10-CM

## 2024-01-18 DIAGNOSIS — C18.9 MALIGNANT NEOPLASM OF COLON, UNSPECIFIED PART OF COLON (HCC): ICD-10-CM

## 2024-01-18 DIAGNOSIS — K29.70 GASTRITIS WITHOUT BLEEDING, UNSPECIFIED CHRONICITY, UNSPECIFIED GASTRITIS TYPE: ICD-10-CM

## 2024-01-18 DIAGNOSIS — N20.0 NEPHROLITHIASIS: ICD-10-CM

## 2024-01-18 DIAGNOSIS — K63.89 EPIPLOIC APPENDAGITIS: Primary | ICD-10-CM

## 2024-01-18 DIAGNOSIS — Z87.19 HISTORY OF INCISIONAL HERNIA REPAIR: ICD-10-CM

## 2024-01-18 DIAGNOSIS — N28.1 RENAL CYST: ICD-10-CM

## 2024-01-18 DIAGNOSIS — N20.0 BILATERAL NEPHROLITHIASIS: ICD-10-CM

## 2024-01-18 DIAGNOSIS — Z98.890 HISTORY OF INCISIONAL HERNIA REPAIR: ICD-10-CM

## 2024-01-18 DIAGNOSIS — R03.0 ELEVATED BLOOD PRESSURE READING: ICD-10-CM

## 2024-01-18 LAB
ALBUMIN SERPL-MCNC: 4.8 G/DL (ref 3.4–5)
ALBUMIN/GLOB SERPL: 1.5 {RATIO} (ref 1.1–2.2)
ALP SERPL-CCNC: 129 U/L (ref 40–129)
ALT SERPL-CCNC: 38 U/L (ref 10–40)
ANION GAP SERPL CALCULATED.3IONS-SCNC: 11 MMOL/L (ref 3–16)
AST SERPL-CCNC: 23 U/L (ref 15–37)
BACTERIA URNS QL MICRO: ABNORMAL /HPF
BASOPHILS # BLD: 0.5 K/UL (ref 0–0.2)
BASOPHILS NFR BLD: 5 %
BILIRUB SERPL-MCNC: 0.3 MG/DL (ref 0–1)
BILIRUB UR QL STRIP.AUTO: NEGATIVE
BUN SERPL-MCNC: 14 MG/DL (ref 7–20)
CALCIUM SERPL-MCNC: 9.3 MG/DL (ref 8.3–10.6)
CHLORIDE SERPL-SCNC: 101 MMOL/L (ref 99–110)
CLARITY UR: CLEAR
CO2 SERPL-SCNC: 27 MMOL/L (ref 21–32)
COLOR UR: ABNORMAL
CREAT SERPL-MCNC: 0.7 MG/DL (ref 0.9–1.3)
DEPRECATED RDW RBC AUTO: 13.2 % (ref 12.4–15.4)
EOSINOPHIL # BLD: 0.1 K/UL (ref 0–0.6)
EOSINOPHIL NFR BLD: 1.2 %
EPI CELLS #/AREA URNS HPF: ABNORMAL /HPF (ref 0–5)
GFR SERPLBLD CREATININE-BSD FMLA CKD-EPI: >60 ML/MIN/{1.73_M2}
GLUCOSE SERPL-MCNC: 101 MG/DL (ref 70–99)
GLUCOSE UR STRIP.AUTO-MCNC: NEGATIVE MG/DL
HCT VFR BLD AUTO: 46.1 % (ref 40.5–52.5)
HGB BLD-MCNC: 16 G/DL (ref 13.5–17.5)
HGB UR QL STRIP.AUTO: ABNORMAL
KETONES UR STRIP.AUTO-MCNC: NEGATIVE MG/DL
LACTATE BLDV-SCNC: 1.2 MMOL/L (ref 0.4–1.9)
LEUKOCYTE ESTERASE UR QL STRIP.AUTO: NEGATIVE
LIPASE SERPL-CCNC: 30 U/L (ref 13–60)
LYMPHOCYTES # BLD: 1.1 K/UL (ref 1–5.1)
LYMPHOCYTES NFR BLD: 10.8 %
MCH RBC QN AUTO: 30 PG (ref 26–34)
MCHC RBC AUTO-ENTMCNC: 34.6 G/DL (ref 31–36)
MCV RBC AUTO: 86.8 FL (ref 80–100)
MONOCYTES # BLD: 0.7 K/UL (ref 0–1.3)
MONOCYTES NFR BLD: 7.1 %
MUCOUS THREADS #/AREA URNS LPF: ABNORMAL /LPF
NEUTROPHILS # BLD: 7.5 K/UL (ref 1.7–7.7)
NEUTROPHILS NFR BLD: 75.9 %
NITRITE UR QL STRIP.AUTO: NEGATIVE
PH UR STRIP.AUTO: 7 [PH] (ref 5–8)
PLATELET # BLD AUTO: 258 K/UL (ref 135–450)
PMV BLD AUTO: 7.6 FL (ref 5–10.5)
POTASSIUM SERPL-SCNC: 4 MMOL/L (ref 3.5–5.1)
PROT SERPL-MCNC: 7.9 G/DL (ref 6.4–8.2)
PROT UR STRIP.AUTO-MCNC: NEGATIVE MG/DL
RBC # BLD AUTO: 5.31 M/UL (ref 4.2–5.9)
RBC #/AREA URNS HPF: ABNORMAL /HPF (ref 0–4)
SODIUM SERPL-SCNC: 139 MMOL/L (ref 136–145)
SP GR UR STRIP.AUTO: 1.01 (ref 1–1.03)
TROPONIN, HIGH SENSITIVITY: 7 NG/L (ref 0–22)
TROPONIN, HIGH SENSITIVITY: 7 NG/L (ref 0–22)
UA COMPLETE W REFLEX CULTURE PNL UR: ABNORMAL
UA DIPSTICK W REFLEX MICRO PNL UR: YES
URN SPEC COLLECT METH UR: ABNORMAL
UROBILINOGEN UR STRIP-ACNC: 0.2 E.U./DL
WBC # BLD AUTO: 9.9 K/UL (ref 4–11)
WBC #/AREA URNS HPF: ABNORMAL /HPF (ref 0–5)

## 2024-01-18 PROCEDURE — 96374 THER/PROPH/DIAG INJ IV PUSH: CPT

## 2024-01-18 PROCEDURE — 6360000002 HC RX W HCPCS: Performed by: REGISTERED NURSE

## 2024-01-18 PROCEDURE — 71046 X-RAY EXAM CHEST 2 VIEWS: CPT

## 2024-01-18 PROCEDURE — 84484 ASSAY OF TROPONIN QUANT: CPT

## 2024-01-18 PROCEDURE — 2580000003 HC RX 258: Performed by: REGISTERED NURSE

## 2024-01-18 PROCEDURE — 36415 COLL VENOUS BLD VENIPUNCTURE: CPT

## 2024-01-18 PROCEDURE — 96375 TX/PRO/DX INJ NEW DRUG ADDON: CPT

## 2024-01-18 PROCEDURE — 99214 OFFICE O/P EST MOD 30 MIN: CPT | Performed by: STUDENT IN AN ORGANIZED HEALTH CARE EDUCATION/TRAINING PROGRAM

## 2024-01-18 PROCEDURE — 3080F DIAST BP >= 90 MM HG: CPT | Performed by: STUDENT IN AN ORGANIZED HEALTH CARE EDUCATION/TRAINING PROGRAM

## 2024-01-18 PROCEDURE — 76870 US EXAM SCROTUM: CPT

## 2024-01-18 PROCEDURE — 6360000004 HC RX CONTRAST MEDICATION: Performed by: REGISTERED NURSE

## 2024-01-18 PROCEDURE — 83605 ASSAY OF LACTIC ACID: CPT

## 2024-01-18 PROCEDURE — 3077F SYST BP >= 140 MM HG: CPT | Performed by: STUDENT IN AN ORGANIZED HEALTH CARE EDUCATION/TRAINING PROGRAM

## 2024-01-18 PROCEDURE — 85025 COMPLETE CBC W/AUTO DIFF WBC: CPT

## 2024-01-18 PROCEDURE — 99285 EMERGENCY DEPT VISIT HI MDM: CPT

## 2024-01-18 PROCEDURE — 93005 ELECTROCARDIOGRAM TRACING: CPT | Performed by: REGISTERED NURSE

## 2024-01-18 PROCEDURE — 81001 URINALYSIS AUTO W/SCOPE: CPT

## 2024-01-18 PROCEDURE — 83690 ASSAY OF LIPASE: CPT

## 2024-01-18 PROCEDURE — 80053 COMPREHEN METABOLIC PANEL: CPT

## 2024-01-18 PROCEDURE — 93975 VASCULAR STUDY: CPT

## 2024-01-18 PROCEDURE — 74177 CT ABD & PELVIS W/CONTRAST: CPT

## 2024-01-18 RX ORDER — ONDANSETRON 4 MG/1
4 TABLET, FILM COATED ORAL 3 TIMES DAILY PRN
Qty: 15 TABLET | Refills: 0 | Status: SHIPPED | OUTPATIENT
Start: 2024-01-18

## 2024-01-18 RX ORDER — KETOROLAC TROMETHAMINE 15 MG/ML
30 INJECTION, SOLUTION INTRAMUSCULAR; INTRAVENOUS ONCE
Status: COMPLETED | OUTPATIENT
Start: 2024-01-18 | End: 2024-01-18

## 2024-01-18 RX ORDER — MORPHINE SULFATE 4 MG/ML
4 INJECTION, SOLUTION INTRAMUSCULAR; INTRAVENOUS
Status: COMPLETED | OUTPATIENT
Start: 2024-01-18 | End: 2024-01-18

## 2024-01-18 RX ORDER — 0.9 % SODIUM CHLORIDE 0.9 %
1000 INTRAVENOUS SOLUTION INTRAVENOUS ONCE
Status: COMPLETED | OUTPATIENT
Start: 2024-01-18 | End: 2024-01-18

## 2024-01-18 RX ORDER — FAMOTIDINE 20 MG/1
20 TABLET, FILM COATED ORAL NIGHTLY PRN
Qty: 30 TABLET | Refills: 0 | Status: SHIPPED | OUTPATIENT
Start: 2024-01-18

## 2024-01-18 RX ORDER — AMOXICILLIN AND CLAVULANATE POTASSIUM 875; 125 MG/1; MG/1
1 TABLET, FILM COATED ORAL 2 TIMES DAILY
Qty: 20 TABLET | Refills: 0 | Status: SHIPPED | OUTPATIENT
Start: 2024-01-18 | End: 2024-01-28

## 2024-01-18 RX ORDER — ONDANSETRON 2 MG/ML
4 INJECTION INTRAMUSCULAR; INTRAVENOUS ONCE
Status: COMPLETED | OUTPATIENT
Start: 2024-01-18 | End: 2024-01-18

## 2024-01-18 RX ORDER — KETOROLAC TROMETHAMINE 10 MG/1
10 TABLET, FILM COATED ORAL EVERY 6 HOURS PRN
Qty: 12 TABLET | Refills: 0 | Status: SHIPPED | OUTPATIENT
Start: 2024-01-18 | End: 2024-01-21

## 2024-01-18 RX ADMIN — MORPHINE SULFATE 4 MG: 4 INJECTION, SOLUTION INTRAMUSCULAR; INTRAVENOUS at 18:36

## 2024-01-18 RX ADMIN — IOPAMIDOL 75 ML: 755 INJECTION, SOLUTION INTRAVENOUS at 19:01

## 2024-01-18 RX ADMIN — ONDANSETRON 4 MG: 2 INJECTION INTRAMUSCULAR; INTRAVENOUS at 18:36

## 2024-01-18 RX ADMIN — SODIUM CHLORIDE 1000 ML: 9 INJECTION, SOLUTION INTRAVENOUS at 19:00

## 2024-01-18 RX ADMIN — KETOROLAC TROMETHAMINE 30 MG: 15 INJECTION, SOLUTION INTRAMUSCULAR; INTRAVENOUS at 20:15

## 2024-01-18 SDOH — ECONOMIC STABILITY: FOOD INSECURITY: WITHIN THE PAST 12 MONTHS, THE FOOD YOU BOUGHT JUST DIDN'T LAST AND YOU DIDN'T HAVE MONEY TO GET MORE.: NEVER TRUE

## 2024-01-18 SDOH — ECONOMIC STABILITY: FOOD INSECURITY: WITHIN THE PAST 12 MONTHS, YOU WORRIED THAT YOUR FOOD WOULD RUN OUT BEFORE YOU GOT MONEY TO BUY MORE.: NEVER TRUE

## 2024-01-18 SDOH — ECONOMIC STABILITY: INCOME INSECURITY: HOW HARD IS IT FOR YOU TO PAY FOR THE VERY BASICS LIKE FOOD, HOUSING, MEDICAL CARE, AND HEATING?: NOT HARD AT ALL

## 2024-01-18 ASSESSMENT — ENCOUNTER SYMPTOMS
CONSTIPATION: 0
FLATUS: 1
DIARRHEA: 1
SHORTNESS OF BREATH: 0
ABDOMINAL PAIN: 1
VOMITING: 0
VOMITING: 0
HEMATOCHEZIA: 0
DIARRHEA: 0
NAUSEA: 0
NAUSEA: 0
ABDOMINAL PAIN: 1
CONSTIPATION: 0
BELCHING: 1
BLOOD IN STOOL: 0
CHEST TIGHTNESS: 0

## 2024-01-18 ASSESSMENT — LIFESTYLE VARIABLES
HOW MANY STANDARD DRINKS CONTAINING ALCOHOL DO YOU HAVE ON A TYPICAL DAY: 1 OR 2
HOW OFTEN DO YOU HAVE A DRINK CONTAINING ALCOHOL: MONTHLY OR LESS

## 2024-01-18 ASSESSMENT — PAIN DESCRIPTION - ORIENTATION
ORIENTATION: LEFT;LOWER
ORIENTATION: LEFT

## 2024-01-18 ASSESSMENT — PAIN DESCRIPTION - PAIN TYPE: TYPE: ACUTE PAIN

## 2024-01-18 ASSESSMENT — PAIN DESCRIPTION - LOCATION
LOCATION: ABDOMEN
LOCATION: ABDOMEN
LOCATION: FLANK

## 2024-01-18 ASSESSMENT — PAIN - FUNCTIONAL ASSESSMENT: PAIN_FUNCTIONAL_ASSESSMENT: 0-10

## 2024-01-18 ASSESSMENT — PAIN SCALES - GENERAL
PAINLEVEL_OUTOF10: 10

## 2024-01-18 ASSESSMENT — PAIN DESCRIPTION - FREQUENCY: FREQUENCY: CONTINUOUS

## 2024-01-18 NOTE — PROGRESS NOTES
inflammatory  stranding surrounding the gallbladder  GI/Bowel: No significant small bowel distention noted.  Moderate stool load  seen in colon.  No bowel obstruction noted.  Anastomotic staple line is seen  in region of ascending colon.  Pelvis: Bladder is distended.  Prominent fat seen in inguinal canal on the  right.  No free fluid.  Peritoneum/Retroperitoneum: No aortic aneurysm.  Atherosclerotic change seen  in aorta.  Atherosclerotic change seen in iliacs.  Bones/Soft Tissues: Tiny periumbilical hernia containing fat is seen.  Spurring is seen in the spine.  Spurring is seen in the hips.     Colonscopy 7/2023 Excavated lesions Multiple non-bleeding diverticula were seen in the sigmoid colon.  Moderate diverticulosis of the sigmoid colon.    ASSESSMENT/PLAN:   Diagnosis Orders   1. Left lower quadrant abdominal pain  CT ABDOMEN PELVIS W IV CONTRAST Additional Contrast? Radiologist Recommendation    CBC with Auto Differential    amoxicillin-clavulanate (AUGMENTIN) 875-125 MG per tablet    famotidine (PEPCID) 20 MG tablet      2. History of incisional hernia repair  CT ABDOMEN PELVIS W IV CONTRAST Additional Contrast? Radiologist Recommendation    CBC with Auto Differential      3. Malignant neoplasm of colon, unspecified part of colon (HCC)  CT ABDOMEN PELVIS W IV CONTRAST Additional Contrast? Radiologist Recommendation      4. Bilateral nephrolithiasis  CT ABDOMEN PELVIS W IV CONTRAST Additional Contrast? Radiologist Recommendation      5. Gastritis without bleeding, unspecified chronicity, unspecified gastritis type  famotidine (PEPCID) 20 MG tablet  Trial of addition famotidine at night.      6. Primary hypertension  Uncontrolled, likely secondary to severe pain.  Continue current medications.      Acute worsening over the last 2 days, LLQ abdominal exam with peritoneal signs.  Notably uncomfortable,.  Presentation consistent with acute emergent cause of abdominal pain( hypertensive and tachycardic)

## 2024-01-18 NOTE — ED PROVIDER NOTES
or Vomiting       DISCONTINUED MEDICATIONS:  Discontinued Medications    No medications on file              (Please note that portions ofthis note were completed with a voice recognition program.  Efforts were made to edit the dictations but occasionally words are mis-transcribed.)    PARVEZ Short - CNP (electronically signed)       Safia Padilla APRN - CNP  01/18/24 3638

## 2024-01-19 LAB
EKG ATRIAL RATE: 92 BPM
EKG DIAGNOSIS: NORMAL
EKG P AXIS: 47 DEGREES
EKG P-R INTERVAL: 188 MS
EKG Q-T INTERVAL: 376 MS
EKG QRS DURATION: 96 MS
EKG QTC CALCULATION (BAZETT): 464 MS
EKG R AXIS: 6 DEGREES
EKG T AXIS: 22 DEGREES
EKG VENTRICULAR RATE: 92 BPM

## 2024-01-19 PROCEDURE — 93010 ELECTROCARDIOGRAM REPORT: CPT | Performed by: INTERNAL MEDICINE

## 2024-01-19 NOTE — DISCHARGE INSTRUCTIONS
Alternate Tylenol and ibuprofen for pain until you get the prescription for Toradol then he may take Toradol but no other anti-inflammatories.  You may use Zofran for any associated nausea.  Please follow-up with your GI doctor for further evaluation.  Return to the emergency department for any new or worsening symptoms

## 2024-01-24 ENCOUNTER — OFFICE VISIT (OUTPATIENT)
Dept: FAMILY MEDICINE CLINIC | Age: 59
End: 2024-01-24
Payer: COMMERCIAL

## 2024-01-24 VITALS
SYSTOLIC BLOOD PRESSURE: 132 MMHG | TEMPERATURE: 97.2 F | WEIGHT: 204 LBS | OXYGEN SATURATION: 94 % | BODY MASS INDEX: 30.13 KG/M2 | DIASTOLIC BLOOD PRESSURE: 88 MMHG | RESPIRATION RATE: 16 BRPM | HEART RATE: 97 BPM

## 2024-01-24 DIAGNOSIS — K63.89 EPIPLOIC APPENDAGITIS: Primary | ICD-10-CM

## 2024-01-24 DIAGNOSIS — K58.0 IRRITABLE BOWEL SYNDROME WITH DIARRHEA: ICD-10-CM

## 2024-01-24 DIAGNOSIS — R23.0 TOE CYANOSIS: ICD-10-CM

## 2024-01-24 DIAGNOSIS — I10 PRIMARY HYPERTENSION: ICD-10-CM

## 2024-01-24 PROCEDURE — 3079F DIAST BP 80-89 MM HG: CPT | Performed by: STUDENT IN AN ORGANIZED HEALTH CARE EDUCATION/TRAINING PROGRAM

## 2024-01-24 PROCEDURE — 3075F SYST BP GE 130 - 139MM HG: CPT | Performed by: STUDENT IN AN ORGANIZED HEALTH CARE EDUCATION/TRAINING PROGRAM

## 2024-01-24 PROCEDURE — 99214 OFFICE O/P EST MOD 30 MIN: CPT | Performed by: STUDENT IN AN ORGANIZED HEALTH CARE EDUCATION/TRAINING PROGRAM

## 2024-01-24 RX ORDER — HYDROCODONE BITARTRATE AND ACETAMINOPHEN 5; 325 MG/1; MG/1
1 TABLET ORAL EVERY 12 HOURS PRN
Qty: 6 TABLET | Refills: 0 | Status: SHIPPED | OUTPATIENT
Start: 2024-01-24 | End: 2024-01-27

## 2024-01-24 RX ORDER — NAPROXEN 250 MG/1
500 TABLET ORAL 2 TIMES DAILY PRN
Qty: 60 TABLET | Refills: 5 | Status: SHIPPED | OUTPATIENT
Start: 2024-01-24

## 2024-01-24 SDOH — ECONOMIC STABILITY: FOOD INSECURITY: WITHIN THE PAST 12 MONTHS, YOU WORRIED THAT YOUR FOOD WOULD RUN OUT BEFORE YOU GOT MONEY TO BUY MORE.: NEVER TRUE

## 2024-01-24 SDOH — ECONOMIC STABILITY: INCOME INSECURITY: HOW HARD IS IT FOR YOU TO PAY FOR THE VERY BASICS LIKE FOOD, HOUSING, MEDICAL CARE, AND HEATING?: NOT HARD AT ALL

## 2024-01-24 SDOH — ECONOMIC STABILITY: FOOD INSECURITY: WITHIN THE PAST 12 MONTHS, THE FOOD YOU BOUGHT JUST DIDN'T LAST AND YOU DIDN'T HAVE MONEY TO GET MORE.: NEVER TRUE

## 2024-01-24 ASSESSMENT — PATIENT HEALTH QUESTIONNAIRE - PHQ9
2. FEELING DOWN, DEPRESSED OR HOPELESS: 0
SUM OF ALL RESPONSES TO PHQ QUESTIONS 1-9: 0
SUM OF ALL RESPONSES TO PHQ QUESTIONS 1-9: 0
SUM OF ALL RESPONSES TO PHQ9 QUESTIONS 1 & 2: 0
SUM OF ALL RESPONSES TO PHQ QUESTIONS 1-9: 0
1. LITTLE INTEREST OR PLEASURE IN DOING THINGS: 0

## 2024-01-24 NOTE — PROGRESS NOTES
integrity: No erythema or warmth.      Left foot:      Skin integrity: No erythema or warmth.      Comments: Cold , pulses intact   Skin:     General: Skin is dry.   Neurological:      General: No focal deficit present.      Mental Status: He is alert.   Psychiatric:         Mood and Affect: Mood normal.         Behavior: Behavior normal.         Thought Content: Thought content normal.         Judgment: Judgment normal.              ASSESSMENT/PLAN:  Epiploic appendagitis  Acute abdominal pain, stable.   CT ABDOMEN PELVIS W IV CONTRAST    1. Focal inflammatory stranding surrounding a fatty locule adjacent to the descending colon most consistent with epiploic appendagitis.  2. Bilateral nonobstructive nephrolithiasis.  was given prescriptions for Toradol as well as Zofran .   -Stop ondansetron  , unless severe nausea or vomiting   -Stop ketorolac (TORADOL) 10 MG tablet >> Start NAProxen 500 mg twice daily as needed for pain   -Norco for severe pain 1-2 ties daily   Schedule with gastroenterology   Try probiotic or prebiotics - Lactobacillus   Stop Amoxicillin - clav       -     naproxen (NAPROSYN) 250 MG tablet; Take 2 tablets by mouth 2 times daily as needed for Pain, Disp-60 tablet, R-5Normal  -     HYDROcodone-acetaminophen (NORCO) 5-325 MG per tablet; Take 1 tablet by mouth every 12 hours as needed for Pain for up to 3 days. Intended supply: 3 days. Take lowest dose possible to manage pain Max Daily Amount: 2 tablets, Disp-6 tablet, R-0Normal  Primary hypertension  Controlled.  At goal BP<140/90. Compliant with medications.   ??No change in management, ??continue current ? medications  Discussed DASH diet and dietary sodium restrictions.   Continue dietary efforts and physical activity.     Irritable bowel syndrome with diarrhea  - See gastroenterology     Toe cyanosis  Ultrasound of arteries for feet -- schedule, may need venous     -     VL DIPAK BILATERAL LIMITED 1-2 LEVELS; Future     Return if symptoms worsen

## 2024-01-24 NOTE — PATIENT INSTRUCTIONS
Stop ondansetron for heart , unless severe nausea or vomiting   Stop ketorolac (TORADOL) 10 MG tablet >> Start NAProxen 500 mg twice daily as needed for pain   Norco for severe pain 1-2 ties daily   Schedule with gastroenterology   Try probiotic or prebiotics - Lactobacillus   Stop Amoxicillin - clav     Ultrasound of arteries for feet -- schedule, may need venous     Primary epiploic appendagitis is caused by torsion or spontaneous venous thrombosis of the involved epiploic appendage. Secondary epiploic appendagitis is associated with inflammation of adjacent organs, such as diverticulitis, appendicitis or cholecystitis.     MANAGEMENT  There are limited data from case reports to guide the management of patients with epiploic appendagitis.    Conservative management -- Patients can be managed conservatively with oral anti-inflammatory medications Anti-inflammatories provide analgesia but probably do not modify the disease course. Patients usually do not require hospitalization or antibiotics     Surgery -- We reserve surgical management for patients whose symptoms fail to improve with conservative management, those with new or worsening symptoms (eg, high fever, progressive pain, nausea, vomiting, or inability to tolerate an oral diet), or complications

## 2024-01-30 NOTE — TELEPHONE ENCOUNTER
3/22/2023    No future appointments.     Prednisone Counseling:  I discussed with the patient the risks of prolonged use of prednisone including but not limited to weight gain, insomnia, osteoporosis, mood changes, diabetes, susceptibility to infection, glaucoma and high blood pressure.  In cases where prednisone use is prolonged, patients should be monitored with blood pressure checks, serum glucose levels and an eye exam.  Additionally, the patient may need to be placed on GI prophylaxis, PCP prophylaxis, and calcium and vitamin D supplementation and/or a bisphosphonate.  The patient verbalized understanding of the proper use and the possible adverse effects of prednisone.  All of the patient's questions and concerns were addressed.

## 2024-02-05 ENCOUNTER — TELEPHONE (OUTPATIENT)
Dept: FAMILY MEDICINE CLINIC | Age: 59
End: 2024-02-05

## 2024-02-05 RX ORDER — AMLODIPINE BESYLATE 5 MG/1
TABLET ORAL
Qty: 90 TABLET | Refills: 0 | Status: SHIPPED | OUTPATIENT
Start: 2024-02-05

## 2024-02-05 NOTE — TELEPHONE ENCOUNTER
I would have him hold the medication, but we may need to have him come in for an appointment to further evaluate the rash. He should also call the GI who prescribed the medication to discuss the  rash as well

## 2024-02-05 NOTE — TELEPHONE ENCOUNTER
----- Message from Babita Jay sent at 2/5/2024  1:17 PM EST -----  Subject: Medication Problem     Medication: pantoprazole (PROTONIX) 20 MG tablet  Dosage: 40 mg 1 time a day  Ordering Provider: Deirdre Darling    Question/Problem: Pt received a rash afer taking the medication. He wasnt   sure if hje still continue to take this medication or not. And he wanted   to no if he should make appt to see PCP.      Pharmacy: [facility] #[id number] - [place], [place] - [address] [phone] - F [phone]    ---------------------------------------------------------------------------  --------------  CALL BACK INFO  [phone]; OK to leave message on voicemail  ---------------------------------------------------------------------------  --------------    SCRIPT ANSWERS  Relationship to Patient: Self

## 2024-02-05 NOTE — TELEPHONE ENCOUNTER
Please tell patient I refilled his blood pressure medicine.  Ask him to follow-up in the next 1-3 weeks as his blood pressure was elevated when he was in and saw Dr. Tracy for the abdominal pain.

## 2024-02-05 NOTE — TELEPHONE ENCOUNTER
Spoke with patient, patient has already notified GI patient is going to wait until he hears from them before scheduling an appointment with us to get the rash evaluated.

## 2024-02-07 NOTE — TELEPHONE ENCOUNTER
Future Appointments   Date Time Provider Department Center   3/6/2024  2:40 PM Mikaela Talley, APRN - CNP [facility]     Patient advised

## 2024-02-09 DIAGNOSIS — R10.32 LEFT LOWER QUADRANT ABDOMINAL PAIN: ICD-10-CM

## 2024-02-09 DIAGNOSIS — K29.70 GASTRITIS WITHOUT BLEEDING, UNSPECIFIED CHRONICITY, UNSPECIFIED GASTRITIS TYPE: ICD-10-CM

## 2024-02-12 RX ORDER — FAMOTIDINE 20 MG/1
20 TABLET, FILM COATED ORAL NIGHTLY PRN
Qty: 30 TABLET | Refills: 0 | Status: SHIPPED | OUTPATIENT
Start: 2024-02-12

## 2024-03-06 ENCOUNTER — TELEMEDICINE (OUTPATIENT)
Dept: FAMILY MEDICINE CLINIC | Age: 59
End: 2024-03-06
Payer: COMMERCIAL

## 2024-03-06 DIAGNOSIS — K63.89 EPIPLOIC APPENDAGITIS: ICD-10-CM

## 2024-03-06 DIAGNOSIS — K29.70 GASTRITIS WITHOUT BLEEDING, UNSPECIFIED CHRONICITY, UNSPECIFIED GASTRITIS TYPE: Primary | ICD-10-CM

## 2024-03-06 DIAGNOSIS — I10 HYPERTENSION, UNSPECIFIED TYPE: ICD-10-CM

## 2024-03-06 DIAGNOSIS — R10.32 LEFT LOWER QUADRANT ABDOMINAL PAIN: ICD-10-CM

## 2024-03-06 DIAGNOSIS — K29.70 GASTRITIS WITHOUT BLEEDING, UNSPECIFIED CHRONICITY, UNSPECIFIED GASTRITIS TYPE: ICD-10-CM

## 2024-03-06 PROCEDURE — 99214 OFFICE O/P EST MOD 30 MIN: CPT | Performed by: NURSE PRACTITIONER

## 2024-03-06 RX ORDER — LOSARTAN POTASSIUM AND HYDROCHLOROTHIAZIDE 12.5; 5 MG/1; MG/1
TABLET ORAL
Qty: 90 TABLET | Refills: 1 | Status: SHIPPED | OUTPATIENT
Start: 2024-03-06

## 2024-03-06 ASSESSMENT — ENCOUNTER SYMPTOMS
CONSTIPATION: 1
COUGH: 0
RESPIRATORY NEGATIVE: 1
SHORTNESS OF BREATH: 0

## 2024-03-06 NOTE — PATIENT INSTRUCTIONS
Patient seen in the ED for severe stomach pain 1/18 CT showed the  treated with Naprosyn.  Patient doing well nowFocal inflammatory stranding surrounding a fatty locule adjacent to the  descending colon most consistent with epiploic appendagitis.  2. Bilateral nonobstructive nephrolithiasis.  Treated successfully with Naprosyn.  Bloating-started on probiotic daily

## 2024-03-06 NOTE — PROGRESS NOTES
Lucas Kumar, was evaluated through a synchronous (real-time) audio-video encounter. The patient (or guardian if applicable) is aware that this is a billable service, which includes applicable co-pays. This Virtual Visit was conducted with patient's (and/or legal guardian's) consent. Patient identification was verified, and a caregiver was present when appropriate.   The patient was located at Home: [address] [place], [place] [address]  Provider was located at Home ([facility] [place] [place]      Lucas Kumar (:  1965) is a Established patient, presenting virtually for evaluation of the following: GI issues.  He was seen on  by Dr. Tracy for pain in his stomach she sent him to the ED where CT was performed results are: Focal inflammatory stranding surrounding a fatty locule adjacent to the  descending colon most consistent with epiploic appendagitis.  2. Bilateral nonobstructive nephrolithiasis.   Focal inflammatory stranding surrounding a fatty locule adjacent to the  descending colon most consistent with epiploic appendagitis.  2. Bilateral nonobstructive nephrolithiasis.  He was treated with Naprosyn and Norco Norco and sent home.  Stating symptoms should resolve slowly on their own.  He is doing well today he is in his office at work when I called he looks good states he really has no more problems except what we talked about.  He does have an outpouching in the top of his stomach right where they did the hernia he will call the surgeon and let them know about this since he had mesh implanted.  He complains of continual bloating and discomfort especially after eating I placed him on a probiotic daily.       Assessment & Plan   Abdominal pain-continue with Naprosyn as directed, continue to follow with his gastroenterologist  Bloating-begin probiotic daily     Subjective   HPI  Review of Systems   Respiratory: Negative.  Negative for cough and shortness of breath.    Cardiovascular:

## 2024-03-07 DIAGNOSIS — K21.00 GASTROESOPHAGEAL REFLUX DISEASE WITH ESOPHAGITIS WITHOUT HEMORRHAGE: ICD-10-CM

## 2024-03-07 RX ORDER — FAMOTIDINE 20 MG/1
20 TABLET, FILM COATED ORAL NIGHTLY PRN
Qty: 90 TABLET | Refills: 1 | Status: SHIPPED | OUTPATIENT
Start: 2024-03-07

## 2024-03-09 NOTE — TELEPHONE ENCOUNTER
injectafer auth not required patient had CT and injection today, referral to general surgery placed, spoke with patient about all if this
38 y/o  PPD#3 c/b sPEC w/ MgSO4 presents to OB triage with complaints of elevated BP at home of 158/103 and a headache unrelieved with Tylenol (@1pm), pain 6 out of 10 on pain scale. She is currently taking Procardia 30mg XL daily, and called her OB office today and was advised to take additional 30mg pill at 1pm, totaling 60mg today. Pt met criteria postpartum for sPEC w/ unrelieved ECHEVERRIA, HELLP was WNL. Received MagSO4 3/6-3/7. Pt endorses history of migraines. Denies visual changes, n/v, epigastric pain, abdominal pain, SOB/CP.    NKDA  Meds: Procardia 30mg XL daily, Zoloft 100mg qd, Tylenol PRN

## 2024-03-11 RX ORDER — AMITRIPTYLINE HYDROCHLORIDE 50 MG/1
50 TABLET, FILM COATED ORAL NIGHTLY
Qty: 90 TABLET | Refills: 1 | Status: SHIPPED | OUTPATIENT
Start: 2024-03-11

## 2024-05-05 DIAGNOSIS — I10 PRIMARY HYPERTENSION: Primary | ICD-10-CM

## 2024-05-06 RX ORDER — AMLODIPINE BESYLATE 5 MG/1
TABLET ORAL
Qty: 30 TABLET | Refills: 0 | Status: SHIPPED | OUTPATIENT
Start: 2024-05-06

## 2024-05-06 NOTE — TELEPHONE ENCOUNTER
No future appointments.  LOV 3/6/2024    Left message for patient to call back to set up appointment.  MyChart message sent as well

## 2024-05-09 ENCOUNTER — OFFICE VISIT (OUTPATIENT)
Dept: FAMILY MEDICINE CLINIC | Age: 59
End: 2024-05-09
Payer: COMMERCIAL

## 2024-05-09 VITALS
DIASTOLIC BLOOD PRESSURE: 86 MMHG | TEMPERATURE: 97.9 F | SYSTOLIC BLOOD PRESSURE: 132 MMHG | OXYGEN SATURATION: 95 % | RESPIRATION RATE: 16 BRPM | BODY MASS INDEX: 30.13 KG/M2 | HEART RATE: 80 BPM | WEIGHT: 204 LBS

## 2024-05-09 DIAGNOSIS — K58.0 IRRITABLE BOWEL SYNDROME WITH DIARRHEA: ICD-10-CM

## 2024-05-09 DIAGNOSIS — M62.08 DIASTASIS RECTI: ICD-10-CM

## 2024-05-09 DIAGNOSIS — I10 HYPERTENSION, UNSPECIFIED TYPE: ICD-10-CM

## 2024-05-09 DIAGNOSIS — K21.00 GASTROESOPHAGEAL REFLUX DISEASE WITH ESOPHAGITIS WITHOUT HEMORRHAGE: ICD-10-CM

## 2024-05-09 DIAGNOSIS — Z00.00 ANNUAL PHYSICAL EXAM: Primary | ICD-10-CM

## 2024-05-09 DIAGNOSIS — Z12.5 SCREENING FOR PROSTATE CANCER: ICD-10-CM

## 2024-05-09 DIAGNOSIS — I10 PRIMARY HYPERTENSION: ICD-10-CM

## 2024-05-09 PROBLEM — D50.0 IRON DEFICIENCY ANEMIA SECONDARY TO BLOOD LOSS (CHRONIC): Status: RESOLVED | Noted: 2019-04-04 | Resolved: 2024-05-09

## 2024-05-09 PROBLEM — R09.02 POSTOPERATIVE HYPOXEMIA: Status: RESOLVED | Noted: 2022-06-02 | Resolved: 2024-05-09

## 2024-05-09 PROBLEM — J98.11 ATELECTASIS: Status: RESOLVED | Noted: 2022-06-02 | Resolved: 2024-05-09

## 2024-05-09 PROBLEM — K65.1 POSTPROCEDURAL INTRAABDOMINAL ABSCESS (HCC): Status: RESOLVED | Noted: 2019-04-26 | Resolved: 2024-05-09

## 2024-05-09 PROBLEM — T81.43XA POSTPROCEDURAL INTRAABDOMINAL ABSCESS: Status: RESOLVED | Noted: 2019-04-26 | Resolved: 2024-05-09

## 2024-05-09 PROBLEM — C18.9 COLON CANCER (HCC): Status: RESOLVED | Noted: 2019-04-16 | Resolved: 2024-05-09

## 2024-05-09 PROBLEM — C18.2 MALIGNANT NEOPLASM OF ASCENDING COLON (HCC): Status: RESOLVED | Noted: 2019-04-09 | Resolved: 2024-05-09

## 2024-05-09 PROBLEM — Z98.890 POSTOPERATIVE HYPOXEMIA: Status: RESOLVED | Noted: 2022-06-02 | Resolved: 2024-05-09

## 2024-05-09 PROBLEM — E44.0 MODERATE MALNUTRITION (HCC): Chronic | Status: RESOLVED | Noted: 2019-04-18 | Resolved: 2024-05-09

## 2024-05-09 PROCEDURE — 3079F DIAST BP 80-89 MM HG: CPT | Performed by: NURSE PRACTITIONER

## 2024-05-09 PROCEDURE — 99396 PREV VISIT EST AGE 40-64: CPT | Performed by: NURSE PRACTITIONER

## 2024-05-09 PROCEDURE — 3075F SYST BP GE 130 - 139MM HG: CPT | Performed by: NURSE PRACTITIONER

## 2024-05-09 RX ORDER — PANTOPRAZOLE SODIUM 40 MG/1
40 TABLET, DELAYED RELEASE ORAL DAILY
COMMUNITY
Start: 2024-03-26

## 2024-05-09 RX ORDER — LOSARTAN POTASSIUM AND HYDROCHLOROTHIAZIDE 12.5; 5 MG/1; MG/1
1 TABLET ORAL DAILY
Qty: 90 TABLET | Refills: 1 | Status: SHIPPED | OUTPATIENT
Start: 2024-05-09

## 2024-05-09 SDOH — HEALTH STABILITY: PHYSICAL HEALTH: ON AVERAGE, HOW MANY MINUTES DO YOU ENGAGE IN EXERCISE AT THIS LEVEL?: 60 MIN

## 2024-05-09 SDOH — ECONOMIC STABILITY: TRANSPORTATION INSECURITY
IN THE PAST 12 MONTHS, HAS LACK OF TRANSPORTATION KEPT YOU FROM MEETINGS, WORK, OR FROM GETTING THINGS NEEDED FOR DAILY LIVING?: NO

## 2024-05-09 SDOH — ECONOMIC STABILITY: FOOD INSECURITY: WITHIN THE PAST 12 MONTHS, THE FOOD YOU BOUGHT JUST DIDN'T LAST AND YOU DIDN'T HAVE MONEY TO GET MORE.: NEVER TRUE

## 2024-05-09 SDOH — ECONOMIC STABILITY: FOOD INSECURITY: WITHIN THE PAST 12 MONTHS, YOU WORRIED THAT YOUR FOOD WOULD RUN OUT BEFORE YOU GOT MONEY TO BUY MORE.: NEVER TRUE

## 2024-05-09 SDOH — ECONOMIC STABILITY: INCOME INSECURITY: HOW HARD IS IT FOR YOU TO PAY FOR THE VERY BASICS LIKE FOOD, HOUSING, MEDICAL CARE, AND HEATING?: NOT HARD AT ALL

## 2024-05-09 SDOH — HEALTH STABILITY: PHYSICAL HEALTH: ON AVERAGE, HOW MANY DAYS PER WEEK DO YOU ENGAGE IN MODERATE TO STRENUOUS EXERCISE (LIKE A BRISK WALK)?: 4 DAYS

## 2024-05-09 ASSESSMENT — ANXIETY QUESTIONNAIRES
6. BECOMING EASILY ANNOYED OR IRRITABLE: NOT AT ALL
7. FEELING AFRAID AS IF SOMETHING AWFUL MIGHT HAPPEN: NOT AT ALL
IF YOU CHECKED OFF ANY PROBLEMS ON THIS QUESTIONNAIRE, HOW DIFFICULT HAVE THESE PROBLEMS MADE IT FOR YOU TO DO YOUR WORK, TAKE CARE OF THINGS AT HOME, OR GET ALONG WITH OTHER PEOPLE: SOMEWHAT DIFFICULT
6. BECOMING EASILY ANNOYED OR IRRITABLE: NOT AT ALL
3. WORRYING TOO MUCH ABOUT DIFFERENT THINGS: SEVERAL DAYS
1. FEELING NERVOUS, ANXIOUS, OR ON EDGE: SEVERAL DAYS
5. BEING SO RESTLESS THAT IT IS HARD TO SIT STILL: NOT AT ALL
5. BEING SO RESTLESS THAT IT IS HARD TO SIT STILL: NOT AT ALL
3. WORRYING TOO MUCH ABOUT DIFFERENT THINGS: SEVERAL DAYS
2. NOT BEING ABLE TO STOP OR CONTROL WORRYING: NOT AT ALL
7. FEELING AFRAID AS IF SOMETHING AWFUL MIGHT HAPPEN: NOT AT ALL
IF YOU CHECKED OFF ANY PROBLEMS ON THIS QUESTIONNAIRE, HOW DIFFICULT HAVE THESE PROBLEMS MADE IT FOR YOU TO DO YOUR WORK, TAKE CARE OF THINGS AT HOME, OR GET ALONG WITH OTHER PEOPLE: SOMEWHAT DIFFICULT
1. FEELING NERVOUS, ANXIOUS, OR ON EDGE: SEVERAL DAYS
2. NOT BEING ABLE TO STOP OR CONTROL WORRYING: NOT AT ALL
4. TROUBLE RELAXING: SEVERAL DAYS
GAD7 TOTAL SCORE: 3
4. TROUBLE RELAXING: SEVERAL DAYS

## 2024-05-09 ASSESSMENT — PATIENT HEALTH QUESTIONNAIRE - PHQ9
4. FEELING TIRED OR HAVING LITTLE ENERGY: SEVERAL DAYS
SUM OF ALL RESPONSES TO PHQ QUESTIONS 1-9: 4
9. THOUGHTS THAT YOU WOULD BE BETTER OFF DEAD, OR OF HURTING YOURSELF: NOT AT ALL
9. THOUGHTS THAT YOU WOULD BE BETTER OFF DEAD, OR OF HURTING YOURSELF: NOT AT ALL
SUM OF ALL RESPONSES TO PHQ QUESTIONS 1-9: 4
1. LITTLE INTEREST OR PLEASURE IN DOING THINGS: NOT AT ALL
2. FEELING DOWN, DEPRESSED OR HOPELESS: NOT AT ALL
10. IF YOU CHECKED OFF ANY PROBLEMS, HOW DIFFICULT HAVE THESE PROBLEMS MADE IT FOR YOU TO DO YOUR WORK, TAKE CARE OF THINGS AT HOME, OR GET ALONG WITH OTHER PEOPLE: SOMEWHAT DIFFICULT
1. LITTLE INTEREST OR PLEASURE IN DOING THINGS: NOT AT ALL
7. TROUBLE CONCENTRATING ON THINGS, SUCH AS READING THE NEWSPAPER OR WATCHING TELEVISION: SEVERAL DAYS
5. POOR APPETITE OR OVEREATING: NOT AT ALL
SUM OF ALL RESPONSES TO PHQ9 QUESTIONS 1 & 2: 0
8. MOVING OR SPEAKING SO SLOWLY THAT OTHER PEOPLE COULD HAVE NOTICED. OR THE OPPOSITE - BEING SO FIDGETY OR RESTLESS THAT YOU HAVE BEEN MOVING AROUND A LOT MORE THAN USUAL: NOT AT ALL
7. TROUBLE CONCENTRATING ON THINGS, SUCH AS READING THE NEWSPAPER OR WATCHING TELEVISION: SEVERAL DAYS
SUM OF ALL RESPONSES TO PHQ QUESTIONS 1-9: 4
8. MOVING OR SPEAKING SO SLOWLY THAT OTHER PEOPLE COULD HAVE NOTICED. OR THE OPPOSITE, BEING SO FIGETY OR RESTLESS THAT YOU HAVE BEEN MOVING AROUND A LOT MORE THAN USUAL: NOT AT ALL
SUM OF ALL RESPONSES TO PHQ QUESTIONS 1-9: 4
5. POOR APPETITE OR OVEREATING: NOT AT ALL
6. FEELING BAD ABOUT YOURSELF - OR THAT YOU ARE A FAILURE OR HAVE LET YOURSELF OR YOUR FAMILY DOWN: NOT AT ALL
6. FEELING BAD ABOUT YOURSELF - OR THAT YOU ARE A FAILURE OR HAVE LET YOURSELF OR YOUR FAMILY DOWN: NOT AT ALL
4. FEELING TIRED OR HAVING LITTLE ENERGY: SEVERAL DAYS
3. TROUBLE FALLING OR STAYING ASLEEP: MORE THAN HALF THE DAYS
2. FEELING DOWN, DEPRESSED OR HOPELESS: NOT AT ALL
10. IF YOU CHECKED OFF ANY PROBLEMS, HOW DIFFICULT HAVE THESE PROBLEMS MADE IT FOR YOU TO DO YOUR WORK, TAKE CARE OF THINGS AT HOME, OR GET ALONG WITH OTHER PEOPLE: SOMEWHAT DIFFICULT
SUM OF ALL RESPONSES TO PHQ QUESTIONS 1-9: 4
3. TROUBLE FALLING OR STAYING ASLEEP: MORE THAN HALF THE DAYS

## 2024-05-09 ASSESSMENT — ENCOUNTER SYMPTOMS
COLOR CHANGE: 1
CONSTIPATION: 0
DIARRHEA: 0
ABDOMINAL PAIN: 0
WHEEZING: 0
COUGH: 0
NAUSEA: 0
EYE REDNESS: 0
CHEST TIGHTNESS: 0
EYE ITCHING: 0
TROUBLE SWALLOWING: 0
SORE THROAT: 0
SHORTNESS OF BREATH: 0
SINUS PRESSURE: 0
ABDOMINAL DISTENTION: 1

## 2024-05-09 NOTE — PROGRESS NOTES
Referred to Provider:   Connie Dumont DO     Requested Specialty:   Dermatology     Number of Visits Requested:   1       Patient Education:    Counseled on importance of healthy diet and regular exercise of at least 30 minutes on four or more days during the week.  Counseled on skin safety, SPF 30 or higher prior to going outdoors and reapplication every twohours while outside. Monitor moles for changes, report to provider if greater than 6 mm, color variations, asymmetry, redness, scales, and/or overlying skin changes  Counseled on safety, wear seatbelt, do not consumealcohol and drive or drive with anyone who has consumed alcohol    Follow Up  3 months and shingles vaccine

## 2024-05-18 RX ORDER — FLUTICASONE PROPIONATE 50 MCG
2 SPRAY, SUSPENSION (ML) NASAL DAILY
Qty: 16 G | Refills: 0 | Status: SHIPPED | OUTPATIENT
Start: 2024-05-18

## 2024-06-11 RX ORDER — FLUTICASONE PROPIONATE 50 MCG
2 SPRAY, SUSPENSION (ML) NASAL DAILY
Qty: 48 G | Refills: 1 | Status: SHIPPED | OUTPATIENT
Start: 2024-06-11

## 2024-08-14 RX ORDER — ERGOCALCIFEROL 1.25 MG/1
CAPSULE ORAL
Qty: 12 CAPSULE | Refills: 0 | Status: SHIPPED | OUTPATIENT
Start: 2024-08-14

## 2024-09-27 DIAGNOSIS — K21.00 GASTROESOPHAGEAL REFLUX DISEASE WITH ESOPHAGITIS WITHOUT HEMORRHAGE: ICD-10-CM

## 2024-09-27 RX ORDER — AMITRIPTYLINE HYDROCHLORIDE 50 MG/1
50 TABLET ORAL NIGHTLY
Qty: 90 TABLET | Refills: 0 | Status: SHIPPED | OUTPATIENT
Start: 2024-09-27

## 2024-09-29 NOTE — PROGRESS NOTES
Discharge instructions reviewed with patient/responsible adult and understanding verbalized. Discharge instructions signed and copies given. Patient discharged home with belongings. Scant bleeding from 3 head incisions.
Fidelina Adonay    Age 48 y.o.    male    1965    MRN [id number]    Date___________   Arrival Time_____________  OR Time____________Duration____     Procedure(s):  EXCISION CYST OF SCALP    Surgeon  ________________________________  [place]   General   [place]       Phone [phone] (home)       [address]  Cell Work  ______________________________________________________________________________________________________________________________________________________________________________________________________________________________________________________________________________________________________________________________________________________________    PCP__________________________Phone__________________________________      H&P__________________Bringing      Chart              Epic    DOS           Called_______  EKG__________________Bringing      Chart              Epic    DOS           Called_______  LAB__________________ Bringing      Chart              Epic    DOS           Called_______  CardiacClearance _______Bringing      Chart              Epic    DOS           Called_______      Cardiologist________________________ Phone___________________________      ? Hinduism concerns / Waiver on Chart            PAT Communications________________  ? Pre-op Instructions Given [place]          _________________________________  ? Directions to Surgery Center                          _________________________________  ? Transportation Home_______________      _________________________________  ?  Crutches/Walker__________________        _________________________________      ________Pre-op Orders   _______Transcribed    _______Wt.  ________Pharmacy          _______SCD  ______VTE     ______Beta Blocker  ________Consent
Gait steady, no pain. To home per self.
neuro at bedside, patient to require admission for further workup. will need barium swallow.

## 2024-10-04 ENCOUNTER — TELEPHONE (OUTPATIENT)
Dept: FAMILY MEDICINE CLINIC | Age: 59
End: 2024-10-04

## 2024-10-04 DIAGNOSIS — I10 PRIMARY HYPERTENSION: ICD-10-CM

## 2024-10-04 RX ORDER — AMLODIPINE BESYLATE 5 MG/1
5 TABLET ORAL DAILY
Qty: 30 TABLET | Refills: 0 | Status: SHIPPED | OUTPATIENT
Start: 2024-10-04

## 2024-10-04 NOTE — TELEPHONE ENCOUNTER
Patient needs a refill on Amlodipine. They need a 30 day supply.     Mail order or local pharmacy: local    Pharmacy: [facility]    Last OV: 5/9/24 IVANIA Irving    No future appointments.

## 2024-10-08 ASSESSMENT — ANXIETY QUESTIONNAIRES
2. NOT BEING ABLE TO STOP OR CONTROL WORRYING: NOT AT ALL
3. WORRYING TOO MUCH ABOUT DIFFERENT THINGS: NOT AT ALL
1. FEELING NERVOUS, ANXIOUS, OR ON EDGE: NOT AT ALL
IF YOU CHECKED OFF ANY PROBLEMS ON THIS QUESTIONNAIRE, HOW DIFFICULT HAVE THESE PROBLEMS MADE IT FOR YOU TO DO YOUR WORK, TAKE CARE OF THINGS AT HOME, OR GET ALONG WITH OTHER PEOPLE: NOT DIFFICULT AT ALL
4. TROUBLE RELAXING: NOT AT ALL
2. NOT BEING ABLE TO STOP OR CONTROL WORRYING: NOT AT ALL
5. BEING SO RESTLESS THAT IT IS HARD TO SIT STILL: NOT AT ALL
4. TROUBLE RELAXING: NOT AT ALL
7. FEELING AFRAID AS IF SOMETHING AWFUL MIGHT HAPPEN: NOT AT ALL
7. FEELING AFRAID AS IF SOMETHING AWFUL MIGHT HAPPEN: NOT AT ALL
GAD7 TOTAL SCORE: 0
6. BECOMING EASILY ANNOYED OR IRRITABLE: NOT AT ALL
6. BECOMING EASILY ANNOYED OR IRRITABLE: NOT AT ALL
3. WORRYING TOO MUCH ABOUT DIFFERENT THINGS: NOT AT ALL
1. FEELING NERVOUS, ANXIOUS, OR ON EDGE: NOT AT ALL
IF YOU CHECKED OFF ANY PROBLEMS ON THIS QUESTIONNAIRE, HOW DIFFICULT HAVE THESE PROBLEMS MADE IT FOR YOU TO DO YOUR WORK, TAKE CARE OF THINGS AT HOME, OR GET ALONG WITH OTHER PEOPLE: NOT DIFFICULT AT ALL
5. BEING SO RESTLESS THAT IT IS HARD TO SIT STILL: NOT AT ALL

## 2024-10-08 ASSESSMENT — PATIENT HEALTH QUESTIONNAIRE - PHQ9
1. LITTLE INTEREST OR PLEASURE IN DOING THINGS: NOT AT ALL
SUM OF ALL RESPONSES TO PHQ9 QUESTIONS 1 & 2: 0
1. LITTLE INTEREST OR PLEASURE IN DOING THINGS: NOT AT ALL
2. FEELING DOWN, DEPRESSED OR HOPELESS: NOT AT ALL
2. FEELING DOWN, DEPRESSED OR HOPELESS: NOT AT ALL
SUM OF ALL RESPONSES TO PHQ QUESTIONS 1-9: 0
SUM OF ALL RESPONSES TO PHQ QUESTIONS 1-9: 0
SUM OF ALL RESPONSES TO PHQ9 QUESTIONS 1 & 2: 0
SUM OF ALL RESPONSES TO PHQ QUESTIONS 1-9: 0
SUM OF ALL RESPONSES TO PHQ QUESTIONS 1-9: 0

## 2024-10-09 ENCOUNTER — OFFICE VISIT (OUTPATIENT)
Dept: FAMILY MEDICINE CLINIC | Age: 59
End: 2024-10-09
Payer: COMMERCIAL

## 2024-10-09 ENCOUNTER — HOSPITAL ENCOUNTER (OUTPATIENT)
Dept: GENERAL RADIOLOGY | Age: 59
Discharge: HOME | End: 2024-10-09
Payer: COMMERCIAL

## 2024-10-09 VITALS
SYSTOLIC BLOOD PRESSURE: 138 MMHG | HEART RATE: 78 BPM | TEMPERATURE: 98.1 F | OXYGEN SATURATION: 98 % | RESPIRATION RATE: 16 BRPM | WEIGHT: 202 LBS | BODY MASS INDEX: 29.83 KG/M2 | DIASTOLIC BLOOD PRESSURE: 76 MMHG

## 2024-10-09 DIAGNOSIS — L98.9 SKIN LESION: ICD-10-CM

## 2024-10-09 DIAGNOSIS — R14.0 BLOATING: ICD-10-CM

## 2024-10-09 DIAGNOSIS — M54.9 MID BACK PAIN ON LEFT SIDE: ICD-10-CM

## 2024-10-09 DIAGNOSIS — I10 PRIMARY HYPERTENSION: ICD-10-CM

## 2024-10-09 DIAGNOSIS — R05.3 CHRONIC COUGH: Primary | ICD-10-CM

## 2024-10-09 DIAGNOSIS — R05.3 CHRONIC COUGH: ICD-10-CM

## 2024-10-09 DIAGNOSIS — M25.511 ACUTE PAIN OF RIGHT SHOULDER: ICD-10-CM

## 2024-10-09 PROCEDURE — 3075F SYST BP GE 130 - 139MM HG: CPT | Performed by: NURSE PRACTITIONER

## 2024-10-09 PROCEDURE — 3078F DIAST BP <80 MM HG: CPT | Performed by: NURSE PRACTITIONER

## 2024-10-09 PROCEDURE — 99214 OFFICE O/P EST MOD 30 MIN: CPT | Performed by: NURSE PRACTITIONER

## 2024-10-09 PROCEDURE — 71046 X-RAY EXAM CHEST 2 VIEWS: CPT

## 2024-10-09 RX ORDER — LOSARTAN POTASSIUM AND HYDROCHLOROTHIAZIDE 12.5; 5 MG/1; MG/1
1 TABLET ORAL DAILY
Qty: 90 TABLET | Refills: 1 | Status: SHIPPED | OUTPATIENT
Start: 2024-10-09

## 2024-10-09 RX ORDER — TIZANIDINE 2 MG/1
2 TABLET ORAL EVERY 8 HOURS PRN
Qty: 30 TABLET | Refills: 0 | Status: SHIPPED | OUTPATIENT
Start: 2024-10-09

## 2024-10-09 RX ORDER — AMLODIPINE BESYLATE 5 MG/1
5 TABLET ORAL DAILY
Qty: 30 TABLET | Refills: 0 | Status: SHIPPED | OUTPATIENT
Start: 2024-10-09

## 2024-10-09 SDOH — ECONOMIC STABILITY: FOOD INSECURITY: WITHIN THE PAST 12 MONTHS, YOU WORRIED THAT YOUR FOOD WOULD RUN OUT BEFORE YOU GOT MONEY TO BUY MORE.: NEVER TRUE

## 2024-10-09 SDOH — ECONOMIC STABILITY: FOOD INSECURITY: WITHIN THE PAST 12 MONTHS, THE FOOD YOU BOUGHT JUST DIDN'T LAST AND YOU DIDN'T HAVE MONEY TO GET MORE.: NEVER TRUE

## 2024-10-09 SDOH — ECONOMIC STABILITY: INCOME INSECURITY: HOW HARD IS IT FOR YOU TO PAY FOR THE VERY BASICS LIKE FOOD, HOUSING, MEDICAL CARE, AND HEATING?: NOT HARD AT ALL

## 2024-10-09 ASSESSMENT — ENCOUNTER SYMPTOMS
CONSTIPATION: 0
COUGH: 1
BACK PAIN: 1
COLOR CHANGE: 0
ABDOMINAL DISTENTION: 1
WHEEZING: 0
ABDOMINAL PAIN: 1
SHORTNESS OF BREATH: 0
CHEST TIGHTNESS: 0
RECTAL PAIN: 0
NAUSEA: 0
BLOOD IN STOOL: 0

## 2024-10-09 NOTE — PATIENT INSTRUCTIONS
Start daily probiotic- Align, culturelle, IB guard, daily for 1-2 months  Start stretching the shoulders and back stretches  Chest xray today  New referral to dermatology

## 2024-10-09 NOTE — PROGRESS NOTES
10/9/2024    This is a 58 y.o. male   Chief Complaint   Patient presents with    Cough     Has had 3 different colds recently still has cough is having pain in thoracic part of feels like in lung it's a weird pain     Shoulder Pain     Has been having pain and weakness started about 3 months ago   .    Maxwell is seen today for ongoing and intermittent uri symptoms that have been ongoing for about a month. He states that his family has been passing it around. Overall his symptoms are gone except for an intermittent dry cough. This is worse in the morning and will occasionally be productive of clear mucous. No fevers or chills. However he is concerned because he has been having constant pain in his left upper back just below the scapula and close to midline. He is concerned because of his history of colon cancer. He continues to note problems with pain and distention after eating anything. He has discussed this with his GI. He is taking both the PPI and colestipol without relief. He also presents with concerns over right shoulder pain with pushing type movements. He has needed to drop the weight on his right shoulder ( has been lifting imbalanced with heavier weight on the left). The pain in the shoulder is in the anterior shoulder and radiates into the bicep.             Patient Active Problem List   Diagnosis    Polyp of transverse colon    Bile acid malabsorption syndrome    Anemia, chronic disease    Sebaceous cyst    Suspected sleep apnea    Hypertensive disorder    Bilateral nephrolithiasis    Polyp of descending colon    Colon, diverticulosis    History of colon cancer    Insomnia    History of colectomy    Irritable bowel syndrome with diarrhea    History of incisional hernia repair    Pulmonary venous congestion    Overweight (BMI 25.0-29.9)    Elevated TSH       Current Outpatient Medications   Medication Sig Dispense Refill    amLODIPine (NORVASC) 5 MG tablet Take 1 tablet by mouth daily 30 tablet 0

## 2024-12-25 DIAGNOSIS — K21.00 GASTROESOPHAGEAL REFLUX DISEASE WITH ESOPHAGITIS WITHOUT HEMORRHAGE: ICD-10-CM

## 2024-12-26 RX ORDER — AMITRIPTYLINE HYDROCHLORIDE 50 MG/1
50 TABLET ORAL NIGHTLY
Qty: 90 TABLET | Refills: 0 | Status: SHIPPED | OUTPATIENT
Start: 2024-12-26

## 2025-01-24 DIAGNOSIS — K21.00 GASTROESOPHAGEAL REFLUX DISEASE WITH ESOPHAGITIS WITHOUT HEMORRHAGE: ICD-10-CM

## 2025-01-24 RX ORDER — ERGOCALCIFEROL 1.25 MG/1
CAPSULE, LIQUID FILLED ORAL
Qty: 12 CAPSULE | Refills: 0 | OUTPATIENT
Start: 2025-01-24

## 2025-01-24 RX ORDER — AMITRIPTYLINE HYDROCHLORIDE 50 MG/1
50 TABLET ORAL NIGHTLY
Qty: 90 TABLET | Refills: 0 | OUTPATIENT
Start: 2025-01-24

## 2025-01-24 RX ORDER — FLUTICASONE PROPIONATE 50 MCG
SPRAY, SUSPENSION (ML) NASAL
Qty: 16 G | Refills: 0 | OUTPATIENT
Start: 2025-01-24

## 2025-01-24 NOTE — TELEPHONE ENCOUNTER
Pt needs appointment. We need to recheck his vit d levels to see if he needs to continue at the higher dose. This can wait until his physical in may. Please have him schedule

## 2025-02-20 ENCOUNTER — TELEPHONE (OUTPATIENT)
Dept: FAMILY MEDICINE CLINIC | Age: 60
End: 2025-02-20

## 2025-02-20 DIAGNOSIS — Z12.5 SCREENING FOR PROSTATE CANCER: Primary | ICD-10-CM

## 2025-02-20 NOTE — TELEPHONE ENCOUNTER
Patient scheduled appointment and asking for labs to be placed before his appointment   Future Appointments   Date Time Provider Department Center   5/16/2025  8:20 AM Shelli King, APRN - CNP [facility] DEP

## 2025-03-18 ENCOUNTER — APPOINTMENT (OUTPATIENT)
Dept: CT IMAGING | Age: 60
End: 2025-03-18
Payer: COMMERCIAL

## 2025-03-18 ENCOUNTER — HOSPITAL ENCOUNTER (OUTPATIENT)
Age: 60
Setting detail: OBSERVATION
Discharge: HOME | End: 2025-03-20
Attending: EMERGENCY MEDICINE | Admitting: INTERNAL MEDICINE
Payer: COMMERCIAL

## 2025-03-18 DIAGNOSIS — E87.1 HYPONATREMIA: ICD-10-CM

## 2025-03-18 DIAGNOSIS — R42 DIZZINESS: Primary | ICD-10-CM

## 2025-03-18 DIAGNOSIS — I10 PRIMARY HYPERTENSION: ICD-10-CM

## 2025-03-18 DIAGNOSIS — R11.0 NAUSEA: ICD-10-CM

## 2025-03-18 DIAGNOSIS — H81.21 VESTIBULAR NEURONITIS OF RIGHT EAR: ICD-10-CM

## 2025-03-18 LAB
ALBUMIN SERPL-MCNC: 4.4 G/DL (ref 3.4–5)
ALBUMIN/GLOB SERPL: 1.7 {RATIO} (ref 1.1–2.2)
ALP SERPL-CCNC: 117 U/L (ref 40–129)
ALT SERPL-CCNC: 42 U/L (ref 10–40)
ANION GAP SERPL CALCULATED.3IONS-SCNC: 14 MMOL/L (ref 3–16)
AST SERPL-CCNC: 28 U/L (ref 15–37)
BASOPHILS # BLD: 0.1 K/UL (ref 0–0.2)
BASOPHILS NFR BLD: 0.7 %
BILIRUB SERPL-MCNC: 0.6 MG/DL (ref 0–1)
BUN SERPL-MCNC: 14 MG/DL (ref 7–20)
CALCIUM SERPL-MCNC: 9.2 MG/DL (ref 8.3–10.6)
CHLORIDE SERPL-SCNC: 102 MMOL/L (ref 99–110)
CO2 SERPL-SCNC: 23 MMOL/L (ref 21–32)
CREAT SERPL-MCNC: 0.9 MG/DL (ref 0.9–1.3)
DEPRECATED RDW RBC AUTO: 12.9 % (ref 12.4–15.4)
EOSINOPHIL # BLD: 0.1 K/UL (ref 0–0.6)
EOSINOPHIL NFR BLD: 1.1 %
GFR SERPLBLD CREATININE-BSD FMLA CKD-EPI: >90 ML/MIN/{1.73_M2}
GLUCOSE BLD-MCNC: 152 MG/DL (ref 70–99)
GLUCOSE SERPL-MCNC: 142 MG/DL (ref 70–99)
HCT VFR BLD AUTO: 43.3 % (ref 40.5–52.5)
HGB BLD-MCNC: 15 G/DL (ref 13.5–17.5)
INR PPP: 1.01 (ref 0.85–1.15)
LYMPHOCYTES # BLD: 1.4 K/UL (ref 1–5.1)
LYMPHOCYTES NFR BLD: 11.5 %
MCH RBC QN AUTO: 30.5 PG (ref 26–34)
MCHC RBC AUTO-ENTMCNC: 34.7 G/DL (ref 31–36)
MCV RBC AUTO: 88 FL (ref 80–100)
MONOCYTES # BLD: 0.9 K/UL (ref 0–1.3)
MONOCYTES NFR BLD: 7.4 %
NEUTROPHILS # BLD: 9.4 K/UL (ref 1.7–7.7)
NEUTROPHILS NFR BLD: 79.3 %
PERFORMED ON: ABNORMAL
PLATELET # BLD AUTO: 249 K/UL (ref 135–450)
PMV BLD AUTO: 7.3 FL (ref 5–10.5)
POTASSIUM SERPL-SCNC: 3.7 MMOL/L (ref 3.5–5.1)
PROT SERPL-MCNC: 7 G/DL (ref 6.4–8.2)
PROTHROMBIN TIME: 13.5 SEC (ref 11.9–14.9)
RBC # BLD AUTO: 4.92 M/UL (ref 4.2–5.9)
SODIUM SERPL-SCNC: 139 MMOL/L (ref 136–145)
TROPONIN, HIGH SENSITIVITY: <6 NG/L (ref 0–22)
WBC # BLD AUTO: 11.9 K/UL (ref 4–11)

## 2025-03-18 PROCEDURE — 84484 ASSAY OF TROPONIN QUANT: CPT

## 2025-03-18 PROCEDURE — 6360000004 HC RX CONTRAST MEDICATION: Performed by: EMERGENCY MEDICINE

## 2025-03-18 PROCEDURE — 2580000003 HC RX 258: Performed by: EMERGENCY MEDICINE

## 2025-03-18 PROCEDURE — 93005 ELECTROCARDIOGRAM TRACING: CPT | Performed by: EMERGENCY MEDICINE

## 2025-03-18 PROCEDURE — 85610 PROTHROMBIN TIME: CPT

## 2025-03-18 PROCEDURE — 70498 CT ANGIOGRAPHY NECK: CPT

## 2025-03-18 PROCEDURE — 96374 THER/PROPH/DIAG INJ IV PUSH: CPT

## 2025-03-18 PROCEDURE — 99285 EMERGENCY DEPT VISIT HI MDM: CPT

## 2025-03-18 PROCEDURE — 6360000002 HC RX W HCPCS: Performed by: EMERGENCY MEDICINE

## 2025-03-18 PROCEDURE — 70450 CT HEAD/BRAIN W/O DYE: CPT

## 2025-03-18 PROCEDURE — 85025 COMPLETE CBC W/AUTO DIFF WBC: CPT

## 2025-03-18 PROCEDURE — 6370000000 HC RX 637 (ALT 250 FOR IP): Performed by: EMERGENCY MEDICINE

## 2025-03-18 PROCEDURE — 80053 COMPREHEN METABOLIC PANEL: CPT

## 2025-03-18 RX ORDER — ASPIRIN 325 MG
325 TABLET ORAL ONCE
Status: COMPLETED | OUTPATIENT
Start: 2025-03-18 | End: 2025-03-18

## 2025-03-18 RX ORDER — MECLIZINE HCL 12.5 MG 12.5 MG/1
25 TABLET ORAL ONCE
Status: COMPLETED | OUTPATIENT
Start: 2025-03-18 | End: 2025-03-18

## 2025-03-18 RX ORDER — ONDANSETRON 2 MG/ML
4 INJECTION INTRAMUSCULAR; INTRAVENOUS ONCE
Status: COMPLETED | OUTPATIENT
Start: 2025-03-18 | End: 2025-03-18

## 2025-03-18 RX ORDER — CLOPIDOGREL BISULFATE 75 MG/1
300 TABLET ORAL ONCE
Status: COMPLETED | OUTPATIENT
Start: 2025-03-18 | End: 2025-03-18

## 2025-03-18 RX ORDER — 0.9 % SODIUM CHLORIDE 0.9 %
500 INTRAVENOUS SOLUTION INTRAVENOUS ONCE
Status: COMPLETED | OUTPATIENT
Start: 2025-03-18 | End: 2025-03-18

## 2025-03-18 RX ORDER — IOPAMIDOL 755 MG/ML
75 INJECTION, SOLUTION INTRAVASCULAR
Status: COMPLETED | OUTPATIENT
Start: 2025-03-18 | End: 2025-03-18

## 2025-03-18 RX ADMIN — SODIUM CHLORIDE 500 ML: 0.9 INJECTION, SOLUTION INTRAVENOUS at 22:52

## 2025-03-18 RX ADMIN — IOPAMIDOL 75 ML: 755 INJECTION, SOLUTION INTRAVENOUS at 22:48

## 2025-03-18 RX ADMIN — ASPIRIN 325 MG: 325 TABLET ORAL at 23:26

## 2025-03-18 RX ADMIN — CLOPIDOGREL BISULFATE 300 MG: 75 TABLET, FILM COATED ORAL at 23:26

## 2025-03-18 RX ADMIN — ONDANSETRON 4 MG: 2 INJECTION, SOLUTION INTRAMUSCULAR; INTRAVENOUS at 22:51

## 2025-03-18 RX ADMIN — MECLIZINE 25 MG: 12.5 TABLET ORAL at 22:50

## 2025-03-18 ASSESSMENT — LIFESTYLE VARIABLES
HOW MANY STANDARD DRINKS CONTAINING ALCOHOL DO YOU HAVE ON A TYPICAL DAY: PATIENT DOES NOT DRINK
HOW OFTEN DO YOU HAVE A DRINK CONTAINING ALCOHOL: NEVER

## 2025-03-18 ASSESSMENT — PAIN DESCRIPTION - LOCATION: LOCATION: ABDOMEN

## 2025-03-18 ASSESSMENT — PAIN SCALES - GENERAL: PAINLEVEL_OUTOF10: 5

## 2025-03-19 ENCOUNTER — APPOINTMENT (OUTPATIENT)
Dept: MRI IMAGING | Age: 60
End: 2025-03-19
Payer: COMMERCIAL

## 2025-03-19 PROBLEM — H81.21 VESTIBULAR NEURONITIS OF RIGHT EAR: Status: ACTIVE | Noted: 2025-03-19

## 2025-03-19 LAB
ANION GAP SERPL CALCULATED.3IONS-SCNC: 11 MMOL/L (ref 3–16)
BUN SERPL-MCNC: 13 MG/DL (ref 7–20)
CALCIUM SERPL-MCNC: 8.8 MG/DL (ref 8.3–10.6)
CHLORIDE SERPL-SCNC: 102 MMOL/L (ref 99–110)
CHOLEST SERPL-MCNC: 179 MG/DL (ref 0–199)
CO2 SERPL-SCNC: 26 MMOL/L (ref 21–32)
CREAT SERPL-MCNC: 0.9 MG/DL (ref 0.9–1.3)
DEPRECATED RDW RBC AUTO: 13 % (ref 12.4–15.4)
EKG ATRIAL RATE: 77 BPM
EKG DIAGNOSIS: NORMAL
EKG P AXIS: 53 DEGREES
EKG P-R INTERVAL: 172 MS
EKG Q-T INTERVAL: 396 MS
EKG QRS DURATION: 82 MS
EKG QTC CALCULATION (BAZETT): 448 MS
EKG R AXIS: -12 DEGREES
EKG T AXIS: 26 DEGREES
EKG VENTRICULAR RATE: 77 BPM
EST. AVERAGE GLUCOSE BLD GHB EST-MCNC: 105.4 MG/DL
GFR SERPLBLD CREATININE-BSD FMLA CKD-EPI: >90 ML/MIN/{1.73_M2}
GLUCOSE SERPL-MCNC: 121 MG/DL (ref 70–99)
HBA1C MFR BLD: 5.3 %
HCT VFR BLD AUTO: 42.2 % (ref 40.5–52.5)
HDLC SERPL-MCNC: 33 MG/DL (ref 40–60)
HGB BLD-MCNC: 14.9 G/DL (ref 13.5–17.5)
LDLC SERPL CALC-MCNC: 102 MG/DL
MCH RBC QN AUTO: 31.2 PG (ref 26–34)
MCHC RBC AUTO-ENTMCNC: 35.4 G/DL (ref 31–36)
MCV RBC AUTO: 88.3 FL (ref 80–100)
PLATELET # BLD AUTO: 264 K/UL (ref 135–450)
PMV BLD AUTO: 7.4 FL (ref 5–10.5)
POTASSIUM SERPL-SCNC: 3.8 MMOL/L (ref 3.5–5.1)
RBC # BLD AUTO: 4.78 M/UL (ref 4.2–5.9)
SODIUM SERPL-SCNC: 139 MMOL/L (ref 136–145)
TRIGL SERPL-MCNC: 219 MG/DL (ref 0–150)
TROPONIN, HIGH SENSITIVITY: <6 NG/L (ref 0–22)
TROPONIN, HIGH SENSITIVITY: <6 NG/L (ref 0–22)
VLDLC SERPL CALC-MCNC: 44 MG/DL
WBC # BLD AUTO: 9.1 K/UL (ref 4–11)

## 2025-03-19 PROCEDURE — 94761 N-INVAS EAR/PLS OXIMETRY MLT: CPT

## 2025-03-19 PROCEDURE — G0378 HOSPITAL OBSERVATION PER HR: HCPCS

## 2025-03-19 PROCEDURE — 2700000000 HC OXYGEN THERAPY PER DAY

## 2025-03-19 PROCEDURE — 96376 TX/PRO/DX INJ SAME DRUG ADON: CPT

## 2025-03-19 PROCEDURE — 6370000000 HC RX 637 (ALT 250 FOR IP): Performed by: NURSE PRACTITIONER

## 2025-03-19 PROCEDURE — 99222 1ST HOSP IP/OBS MODERATE 55: CPT | Performed by: NEUROMUSCULOSKELETAL MEDICINE & OMM

## 2025-03-19 PROCEDURE — 6370000000 HC RX 637 (ALT 250 FOR IP): Performed by: INTERNAL MEDICINE

## 2025-03-19 PROCEDURE — 85027 COMPLETE CBC AUTOMATED: CPT

## 2025-03-19 PROCEDURE — 84484 ASSAY OF TROPONIN QUANT: CPT

## 2025-03-19 PROCEDURE — 96375 TX/PRO/DX INJ NEW DRUG ADDON: CPT

## 2025-03-19 PROCEDURE — 2580000003 HC RX 258: Performed by: NURSE PRACTITIONER

## 2025-03-19 PROCEDURE — APPSS45 APP SPLIT SHARED TIME 31-45 MINUTES

## 2025-03-19 PROCEDURE — 6370000000 HC RX 637 (ALT 250 FOR IP)

## 2025-03-19 PROCEDURE — 93010 ELECTROCARDIOGRAM REPORT: CPT | Performed by: INTERNAL MEDICINE

## 2025-03-19 PROCEDURE — 83036 HEMOGLOBIN GLYCOSYLATED A1C: CPT

## 2025-03-19 PROCEDURE — 97162 PT EVAL MOD COMPLEX 30 MIN: CPT

## 2025-03-19 PROCEDURE — 97530 THERAPEUTIC ACTIVITIES: CPT

## 2025-03-19 PROCEDURE — 70551 MRI BRAIN STEM W/O DYE: CPT

## 2025-03-19 PROCEDURE — 96361 HYDRATE IV INFUSION ADD-ON: CPT

## 2025-03-19 PROCEDURE — 36415 COLL VENOUS BLD VENIPUNCTURE: CPT

## 2025-03-19 PROCEDURE — 2500000003 HC RX 250 WO HCPCS: Performed by: NURSE PRACTITIONER

## 2025-03-19 PROCEDURE — 80061 LIPID PANEL: CPT

## 2025-03-19 PROCEDURE — 80048 BASIC METABOLIC PNL TOTAL CA: CPT

## 2025-03-19 PROCEDURE — 6370000000 HC RX 637 (ALT 250 FOR IP): Performed by: NEUROMUSCULOSKELETAL MEDICINE & OMM

## 2025-03-19 PROCEDURE — 96372 THER/PROPH/DIAG INJ SC/IM: CPT

## 2025-03-19 PROCEDURE — 6360000002 HC RX W HCPCS: Performed by: NURSE PRACTITIONER

## 2025-03-19 PROCEDURE — 6360000002 HC RX W HCPCS

## 2025-03-19 RX ORDER — SODIUM CHLORIDE 9 MG/ML
INJECTION, SOLUTION INTRAVENOUS CONTINUOUS
Status: ACTIVE | OUTPATIENT
Start: 2025-03-19 | End: 2025-03-19

## 2025-03-19 RX ORDER — SODIUM CHLORIDE 0.9 % (FLUSH) 0.9 %
5-40 SYRINGE (ML) INJECTION PRN
Status: DISCONTINUED | OUTPATIENT
Start: 2025-03-19 | End: 2025-03-20 | Stop reason: HOSPADM

## 2025-03-19 RX ORDER — ONDANSETRON 2 MG/ML
4 INJECTION INTRAMUSCULAR; INTRAVENOUS EVERY 6 HOURS PRN
Status: DISCONTINUED | OUTPATIENT
Start: 2025-03-19 | End: 2025-03-19

## 2025-03-19 RX ORDER — LORAZEPAM 2 MG/ML
1 INJECTION INTRAMUSCULAR ONCE
Status: COMPLETED | OUTPATIENT
Start: 2025-03-19 | End: 2025-03-19

## 2025-03-19 RX ORDER — HYDRALAZINE HYDROCHLORIDE 20 MG/ML
10 INJECTION INTRAMUSCULAR; INTRAVENOUS EVERY 6 HOURS PRN
Status: DISCONTINUED | OUTPATIENT
Start: 2025-03-19 | End: 2025-03-20 | Stop reason: HOSPADM

## 2025-03-19 RX ORDER — POLYETHYLENE GLYCOL 3350 17 G/17G
17 POWDER, FOR SOLUTION ORAL DAILY PRN
Status: DISCONTINUED | OUTPATIENT
Start: 2025-03-19 | End: 2025-03-20 | Stop reason: HOSPADM

## 2025-03-19 RX ORDER — PANTOPRAZOLE SODIUM 40 MG/1
40 TABLET, DELAYED RELEASE ORAL DAILY
Status: DISCONTINUED | OUTPATIENT
Start: 2025-03-19 | End: 2025-03-20 | Stop reason: HOSPADM

## 2025-03-19 RX ORDER — ONDANSETRON 4 MG/1
4 TABLET, ORALLY DISINTEGRATING ORAL EVERY 8 HOURS PRN
Status: DISCONTINUED | OUTPATIENT
Start: 2025-03-19 | End: 2025-03-19

## 2025-03-19 RX ORDER — ASPIRIN 81 MG/1
81 TABLET ORAL DAILY
Status: DISCONTINUED | OUTPATIENT
Start: 2025-03-19 | End: 2025-03-20 | Stop reason: HOSPADM

## 2025-03-19 RX ORDER — CHOLESTYRAMINE 4 G/9G
4 POWDER, FOR SUSPENSION ORAL DAILY
Status: DISCONTINUED | OUTPATIENT
Start: 2025-03-19 | End: 2025-03-20 | Stop reason: HOSPADM

## 2025-03-19 RX ORDER — AMLODIPINE BESYLATE 5 MG/1
5 TABLET ORAL DAILY
Status: DISCONTINUED | OUTPATIENT
Start: 2025-03-19 | End: 2025-03-20 | Stop reason: HOSPADM

## 2025-03-19 RX ORDER — METOPROLOL TARTRATE 1 MG/ML
5 INJECTION, SOLUTION INTRAVENOUS ONCE
Status: COMPLETED | OUTPATIENT
Start: 2025-03-19 | End: 2025-03-19

## 2025-03-19 RX ORDER — KETOROLAC TROMETHAMINE 30 MG/ML
30 INJECTION, SOLUTION INTRAMUSCULAR; INTRAVENOUS ONCE
Status: COMPLETED | OUTPATIENT
Start: 2025-03-19 | End: 2025-03-19

## 2025-03-19 RX ORDER — ACETAMINOPHEN 325 MG/1
650 TABLET ORAL EVERY 6 HOURS PRN
Status: DISCONTINUED | OUTPATIENT
Start: 2025-03-19 | End: 2025-03-20 | Stop reason: HOSPADM

## 2025-03-19 RX ORDER — ONDANSETRON 2 MG/ML
4 INJECTION INTRAMUSCULAR; INTRAVENOUS EVERY 6 HOURS
Status: DISCONTINUED | OUTPATIENT
Start: 2025-03-19 | End: 2025-03-20 | Stop reason: HOSPADM

## 2025-03-19 RX ORDER — LOSARTAN POTASSIUM AND HYDROCHLOROTHIAZIDE 12.5; 5 MG/1; MG/1
1 TABLET ORAL DAILY
Status: DISCONTINUED | OUTPATIENT
Start: 2025-03-19 | End: 2025-03-19

## 2025-03-19 RX ORDER — MECLIZINE HCL 12.5 MG 12.5 MG/1
25 TABLET ORAL 3 TIMES DAILY
Status: DISCONTINUED | OUTPATIENT
Start: 2025-03-19 | End: 2025-03-19

## 2025-03-19 RX ORDER — DIAZEPAM 5 MG/1
5 TABLET ORAL NIGHTLY
Status: DISCONTINUED | OUTPATIENT
Start: 2025-03-19 | End: 2025-03-20 | Stop reason: HOSPADM

## 2025-03-19 RX ORDER — SODIUM CHLORIDE 9 MG/ML
INJECTION, SOLUTION INTRAVENOUS PRN
Status: DISCONTINUED | OUTPATIENT
Start: 2025-03-19 | End: 2025-03-20 | Stop reason: HOSPADM

## 2025-03-19 RX ORDER — HYDROCHLOROTHIAZIDE 25 MG/1
12.5 TABLET ORAL DAILY
Status: DISCONTINUED | OUTPATIENT
Start: 2025-03-19 | End: 2025-03-20 | Stop reason: HOSPADM

## 2025-03-19 RX ORDER — LOSARTAN POTASSIUM 25 MG/1
50 TABLET ORAL DAILY
Status: DISCONTINUED | OUTPATIENT
Start: 2025-03-19 | End: 2025-03-20 | Stop reason: HOSPADM

## 2025-03-19 RX ORDER — ATORVASTATIN CALCIUM 80 MG/1
80 TABLET, FILM COATED ORAL NIGHTLY
Status: DISCONTINUED | OUTPATIENT
Start: 2025-03-19 | End: 2025-03-20 | Stop reason: HOSPADM

## 2025-03-19 RX ORDER — ONDANSETRON 4 MG/1
4 TABLET, ORALLY DISINTEGRATING ORAL EVERY 6 HOURS
Status: DISCONTINUED | OUTPATIENT
Start: 2025-03-19 | End: 2025-03-20 | Stop reason: HOSPADM

## 2025-03-19 RX ORDER — ENOXAPARIN SODIUM 100 MG/ML
40 INJECTION SUBCUTANEOUS DAILY
Status: DISCONTINUED | OUTPATIENT
Start: 2025-03-19 | End: 2025-03-20 | Stop reason: HOSPADM

## 2025-03-19 RX ORDER — MECLIZINE HCL 12.5 MG 12.5 MG/1
25 TABLET ORAL EVERY 6 HOURS SCHEDULED
Status: DISCONTINUED | OUTPATIENT
Start: 2025-03-19 | End: 2025-03-20 | Stop reason: HOSPADM

## 2025-03-19 RX ORDER — SODIUM CHLORIDE 0.9 % (FLUSH) 0.9 %
5-40 SYRINGE (ML) INJECTION EVERY 12 HOURS SCHEDULED
Status: DISCONTINUED | OUTPATIENT
Start: 2025-03-19 | End: 2025-03-20 | Stop reason: HOSPADM

## 2025-03-19 RX ORDER — CLOPIDOGREL BISULFATE 75 MG/1
75 TABLET ORAL DAILY
Status: DISCONTINUED | OUTPATIENT
Start: 2025-03-19 | End: 2025-03-20 | Stop reason: HOSPADM

## 2025-03-19 RX ADMIN — AMITRIPTYLINE HYDROCHLORIDE 50 MG: 25 TABLET, FILM COATED ORAL at 01:37

## 2025-03-19 RX ADMIN — MECLIZINE 25 MG: 12.5 TABLET ORAL at 17:01

## 2025-03-19 RX ADMIN — ONDANSETRON 4 MG: 2 INJECTION, SOLUTION INTRAMUSCULAR; INTRAVENOUS at 17:01

## 2025-03-19 RX ADMIN — ONDANSETRON 4 MG: 4 TABLET, ORALLY DISINTEGRATING ORAL at 22:07

## 2025-03-19 RX ADMIN — CLOPIDOGREL BISULFATE 75 MG: 75 TABLET, FILM COATED ORAL at 09:41

## 2025-03-19 RX ADMIN — AMITRIPTYLINE HYDROCHLORIDE 50 MG: 25 TABLET, FILM COATED ORAL at 20:43

## 2025-03-19 RX ADMIN — KETOROLAC TROMETHAMINE 30 MG: 30 INJECTION, SOLUTION INTRAMUSCULAR at 01:06

## 2025-03-19 RX ADMIN — SODIUM CHLORIDE, PRESERVATIVE FREE 10 ML: 5 INJECTION INTRAVENOUS at 20:44

## 2025-03-19 RX ADMIN — CHOLESTYRAMINE 4 G: 4 POWDER, FOR SUSPENSION ORAL at 09:42

## 2025-03-19 RX ADMIN — ONDANSETRON 4 MG: 2 INJECTION INTRAMUSCULAR; INTRAVENOUS at 01:32

## 2025-03-19 RX ADMIN — SODIUM CHLORIDE, PRESERVATIVE FREE 10 ML: 5 INJECTION INTRAVENOUS at 09:43

## 2025-03-19 RX ADMIN — LOSARTAN POTASSIUM 50 MG: 25 TABLET, FILM COATED ORAL at 09:41

## 2025-03-19 RX ADMIN — SODIUM CHLORIDE: 0.9 INJECTION, SOLUTION INTRAVENOUS at 01:40

## 2025-03-19 RX ADMIN — ASPIRIN 81 MG: 81 TABLET, COATED ORAL at 09:42

## 2025-03-19 RX ADMIN — ENOXAPARIN SODIUM 40 MG: 100 INJECTION SUBCUTANEOUS at 09:42

## 2025-03-19 RX ADMIN — MECLIZINE 25 MG: 12.5 TABLET ORAL at 11:15

## 2025-03-19 RX ADMIN — ACETAMINOPHEN 650 MG: 325 TABLET ORAL at 12:17

## 2025-03-19 RX ADMIN — ATORVASTATIN CALCIUM 80 MG: 80 TABLET, FILM COATED ORAL at 20:43

## 2025-03-19 RX ADMIN — MECLIZINE 25 MG: 12.5 TABLET ORAL at 23:55

## 2025-03-19 RX ADMIN — ACETAMINOPHEN 650 MG: 325 TABLET ORAL at 18:59

## 2025-03-19 RX ADMIN — PANTOPRAZOLE SODIUM 40 MG: 40 TABLET, DELAYED RELEASE ORAL at 09:42

## 2025-03-19 RX ADMIN — AMLODIPINE BESYLATE 5 MG: 5 TABLET ORAL at 09:42

## 2025-03-19 RX ADMIN — DIAZEPAM 5 MG: 5 TABLET ORAL at 20:43

## 2025-03-19 RX ADMIN — METOPROLOL TARTRATE 5 MG: 5 INJECTION INTRAVENOUS at 01:33

## 2025-03-19 RX ADMIN — ATORVASTATIN CALCIUM 80 MG: 80 TABLET, FILM COATED ORAL at 01:37

## 2025-03-19 RX ADMIN — HYDROCHLOROTHIAZIDE 12.5 MG: 25 TABLET ORAL at 09:41

## 2025-03-19 RX ADMIN — ONDANSETRON 4 MG: 2 INJECTION, SOLUTION INTRAMUSCULAR; INTRAVENOUS at 09:41

## 2025-03-19 RX ADMIN — LORAZEPAM 1 MG: 2 INJECTION INTRAMUSCULAR; INTRAVENOUS at 01:32

## 2025-03-19 ASSESSMENT — PAIN SCALES - GENERAL
PAINLEVEL_OUTOF10: 10
PAINLEVEL_OUTOF10: 10
PAINLEVEL_OUTOF10: 7

## 2025-03-19 ASSESSMENT — PAIN - FUNCTIONAL ASSESSMENT: PAIN_FUNCTIONAL_ASSESSMENT: ACTIVITIES ARE NOT PREVENTED

## 2025-03-19 ASSESSMENT — PAIN DESCRIPTION - DESCRIPTORS: DESCRIPTORS: ACHING;DISCOMFORT;POUNDING;PRESSURE

## 2025-03-19 ASSESSMENT — PAIN DESCRIPTION - ORIENTATION: ORIENTATION: POSTERIOR;LOWER

## 2025-03-19 ASSESSMENT — PAIN DESCRIPTION - LOCATION: LOCATION: HEAD

## 2025-03-19 NOTE — H&P
[facility] Medicine History & Physical    V 1.6    Date of Admission: 3/18/2025    Date of Service:  Pt seen/examined on 3/18/2025     []Admitted to Inpatient with expected LOS greater than two midnights due to medical therapy.  [x]Placed in Observation status.    Chief Admission Complaint:  Dizziness    Presenting Admission History:      59 y.o. male who presented to [facility] with dizziness.  PMHx significant for obesity, hypertension, GERD and colon cancer. History obtained from the patient and review of EMR.  Patient stated he felt well all day today.  However, he states when he got home from work around 730 8:00 he began to experience some dizziness.  He describes the dizziness as feeling like the \"room is spinning\".  Movement makes the dizziness worse.  He denies any history of vertigo, any type of dizziness and/or TIA/CVA. In the emergency department a CT head was obtained that revealed no acute intracranial abnormality.  CTA head and neck was also obtained that revealed no flow significant stenosis within the head or neck.  The stroke team was consulted in the emergency department and the patient was not a TNKase candidate, but recommended that the patient be started on aspirin and Plavix.  The patient was given Zofran and meclizine which she stated did help slightly.  He also received 500 mL of normal saline.  Upon further evaluation, the patient was found to have a leukocytosis.  This is likely secondary to stress response due to no other signs or symptoms of infection.  The patient was admitted for further evaluation and treatment.  An MRI of the brain without contrast and neurology consult have been ordered for the a.m. The patient denied any other associated symptoms as well as any aggravating and/or alleviating factors. At the time of this assessment, the patient was resting comfortably in bed. He currently denies any chest pain, back pain, abdominal pain, shortness of breath,  respiratory effort. Clear to auscultation bilaterally without Rales/Wheezes/Rhonchi.  Cardiovascular:  Regular rate and rhythm with normal S1/S2 without murmurs, rubs or gallops.  Abdomen:  Soft, obese, round  non-tender, non-distended with normal bowel sounds.  Musculoskeletal:  No clubbing, cyanosis or edema bilaterally.  Full range of motion without deformity.  Neurologic:  Neurovascularly intact without any focal sensory/motor deficits. Cranial nerves: II-XII intact, grossly non-focal.  Psychiatric:  Alert and oriented, thought content appropriate, normal insight  Skin:  Skin color, texture, turgor normal.  No rashes or lesions.  Capillary Refill:  Brisk,< 3 seconds   Peripheral Pulses:  +2 palpable, equal bilaterally     Diet: [x]Adult/General  []Cardiac  []Diabetic  []Low Fat  []NPO  []NPO after Midnight  []Other     DVT Prophylaxis: [x]PPx LMWH  []SQ Heparin  []IPC/SCDs  []Eliquis  []Xarelto  []Coumadin     Code status: [x]Full  []DNR/CCA  []Limited (DNR/CCA with Do Not Intubate)  []DNRCC    Surrogate Decision Maker:   Name Home Phone Work Phone Mobile Phone Relationship Lgl ZAHRA Latham [phone] Spouse No        PT/OT Eval Status:   [x]NOT yet ordered  []Ordered and Pending     [x]Seen with Recommendations for:  [x]Home independently  []Home w/ assist  []HHC  []SNF  []Acute Rehab    Anticipated Discharge Day/Date:  1-2 days pending clinical improvement    Anticipated Discharge Location: [x]Home  []HHC  []SNF  []Acute Rehab  []ECF  []LTAC  []Hospice    Consults:      IP CONSULT TO NEUROLOGY    I personally have obtained, updated and/or reviewed the patient’s medication list on 3/19/2025   --------------------------------------------------------------------------------------------------------------------------------------------------------------------    Imaging:     CTA HEAD NECK W CONTRAST  Result Date: 3/18/2025  CT angiography of the head and neck with contrast HISTORY:Stroke

## 2025-03-19 NOTE — ED PROVIDER NOTES
EMERGENCY DEPARTMENT ENCOUNTER     [facility] C5 - MED SURG/ORTHO     Pt Name: Lucas Kumar   MRN: [id number]   Birthdate 1965   Date of evaluation: 3/18/2025   Provider: Kwadwo Calderón MD   PCP: Shelli King, APRN - CNP   Note Started: 2:23 AM EDT 3/19/25     CHIEF COMPLAINT     Chief Complaint   Patient presents with    Dizziness     Pt came home from work around 1930 and suddenly felt dizzy and had a episode of vomiting. Complains of chest tightness when vomiting.   4mg IM zofran  4mg IV zofran    Chest Pain        HISTORY OF PRESENT ILLNESS:  History from : Patient        Lucas Kumar is a 59 y.o. male who presents for evaluation of dizziness.  Patient reports returning home around 730 this evening.  He reports that he felt slightly unwell and slightly dizzy.  He states that he was able to sit down and eat dinner with his family but was still feeling unwell and slightly dizzy which she describes as the room spinning.  He states that around 2100 the symptoms became significantly worse to the point where he was holding onto the kitchen table and was able to ambulate.  He reports that he did vomit.  He did he reports having a slight headache.  He denies changes in vision or loss of vision.  He denies focal numbness or weakness.  He states that the symptoms are intense enough that he is unable to ambulate.  Patient told his wife to call an ambulance and presents to the ED. patient [place] recent head injury or trauma.  Have a history of colon cancer and is currently in remission after partial colectomy.  Denies ear pain or ear pressure or recent illness.     Nursing Notes were all reviewed and agreed with or any disagreements were addressed in the HPI.     ROS: Positives and Pertinent negatives as per HPI.    PAST MEDICAL HISTORY     Past medical history:  has a past medical history of Colon cancer (HCC) (04/2019), GERD (gastroesophageal reflux disease), Kidney stones, Malignant neoplasm of  demonstrate emergent abnormalities or LVO's.  Patient was evaluated via telestroke.  After discussion of risk and benefits stroke neurology did not recommend thrombolytics for the patient.  Patient in agreement with plan of care.  Stroke neurology did recommend antiplatelet therapy and admission for further medical management evaluation.     Admission discussed with hospitalist agreed to accept the patient.  Patient does report improvement after Zofran and meclizine.          Vitals:    Vitals:    03/18/25 2310 03/18/25 2353 03/19/25 0024 03/19/25 0045   BP: (!) 173/96 (!) 159/96 (!) 165/111 (!) 172/105   Pulse: 87 81 74 72   Resp: 19 14 16 18   Temp:    98.4 °F (36.9 °C)   TempSrc:    Oral   SpO2: 97% 96% 98% 96%   Weight:    92.7 kg (204 lb 4.8 oz)   Height:    1.727 m (5' 8\")        Patient was given the following medications:   Medications   amitriptyline (ELAVIL) tablet 50 mg (50 mg Oral Given 3/19/25 0137)   amLODIPine (NORVASC) tablet 5 mg (has no administration in time range)   cholestyramine (QUESTRAN) packet 4 g (has no administration in time range)   pantoprazole (PROTONIX) tablet 40 mg (has no administration in time range)   sodium chloride flush 0.9 % injection 5-40 mL (has no administration in time range)   sodium chloride flush 0.9 % injection 5-40 mL (has no administration in time range)   0.9 % sodium chloride infusion (has no administration in time range)   ondansetron (ZOFRAN-ODT) disintegrating tablet 4 mg ( Oral See Alternative 3/19/25 0132)     Or   ondansetron (ZOFRAN) injection 4 mg (4 mg IntraVENous Given 3/19/25 0132)   polyethylene glycol (GLYCOLAX) packet 17 g (has no administration in time range)   enoxaparin (LOVENOX) injection 40 mg (has no administration in time range)   0.9 % sodium chloride infusion ( IntraVENous New Bag 3/19/25 0140)   atorvastatin (LIPITOR) tablet 80 mg (80 mg Oral Given 3/19/25 0137)   clopidogrel (PLAVIX) tablet 75 mg (has no administration in time range)

## 2025-03-19 NOTE — PROGRESS NOTES
[facility] Medicine Progress Note  V 1.6      Date of Admission: 3/18/2025    Hospital Day: 2    Chief Admission Complaint:   Dizziness     Subjective: Still has dizziness but better  No numbness or weakness of the extremities  No visual changes or slurred speech  Denies upper respiratory tract infection or any symptoms like cough congestion ear pain    Presenting Admission History:       59 y.o. male who presented to [facility] with dizziness.  PMHx significant for obesity, hypertension, GERD and colon cancer. History obtained from the patient and review of EMR.  Patient stated he felt well all day today.  However, he states when he got home from work around 730 8:00 he began to experience some dizziness.  He describes the dizziness as feeling like the \"room is spinning\".  Movement makes the dizziness worse.  He denies any history of vertigo, any type of dizziness and/or TIA/CVA. In the emergency department a CT head was obtained that revealed no acute intracranial abnormality.  CTA head and neck was also obtained that revealed no flow significant stenosis within the head or neck.  The stroke team was consulted in the emergency department and the patient was not a TNKase candidate, but recommended that the patient be started on aspirin and Plavix.  The patient was given Zofran and meclizine which she stated did help slightly.  He also received 500 mL of normal saline.  Upon further evaluation, the patient was found to have a leukocytosis.  This is likely secondary to stress response due to no other signs or symptoms of infection.  The patient was admitted for further evaluation and treatment.  An MRI of the brain without contrast and neurology consult have been ordered for the a.m. The patient denied any other associated symptoms as well as any aggravating and/or alleviating factors. At the time of this assessment, the patient was resting comfortably in bed. He currently denies any chest pain,  after 5 days of incubation. 04/26/2019 07:24 PM     Lab Results   Component Value Date/Time    BLOODCULT2 No growth after 5 days of incubation. 04/26/2019 07:40 PM     Organism:   Lab Results   Component Value Date/Time    ORG Escherichia coli 04/27/2019 09:40 AM         Paolo Diallo MD

## 2025-03-19 NOTE — PROGRESS NOTES
TODAY'S DATE:  3/19/2025      Current NIHSS 0      Discussed personal risk factors for Stroke/TIA with patient/family, and ways to reduce the risk for a recurrent stroke.     Patient's personal risk factors which were identified are:   []   Alcohol Abuse: check with your physician before any alcohol consumption.   []   Atrial fibrillation: may cause blood clots  []   Drug Abuse: Seek help, talk with your doctor  []   Clotting Disorder  []   Diabetes  []   Family history of stroke or heart disease  [x]   High Blood Pressure/Hypertension: work with your physician  [x]   High cholesterol: monitor cholesterol levels with your physician  [x]   Overweight/Obesity: work with your physician for your ideal body weight  []   Physical Inactivity: get regular exercise as directed by your physician  []   Personal history of previous TIA or stroke  []   Poor Diet: decrease salt (sodium) in your diet, follow diet directed by physician  []   Smoking: stop smoking      Reviewed the Following Education with Patient and/or Family:   - Signs and Symptoms of Stroke  - Personal risk factors   - How to activate EMS (911)   - Importance of Follow Up Appointments at Discharge   - Importance of Compliance with Medications Prescribed at Discharge  - Available community resources and stroke advocacy groups if needed    Patient and/or family member: verbalized understanding.         Electronically signed by Connie Boswell RN on 3/19/2025 at 10:36 AM

## 2025-03-19 NOTE — CARE COORDINATION
Case Management Assessment  Initial Evaluation    Date/Time of Evaluation: 3/19/2025 2:17 PM  Assessment Completed by: ALINA GOMES RN    If patient is discharged prior to next notation, then this note serves as note for discharge by case management.    Patient Name: Lucas Kumar                   YOB: 1965  Diagnosis: Dizziness [R42]  Nausea [R11.0]                   Date / Time: 3/18/2025 10:22 PM    Patient Admission Status: Observation   Readmission Risk (Low < 19, Mod (19-27), High > 27): No data recorded  Current PCP: Shelli King, PARVEZ - CNP  PCP verified by CM? Yes    Chart Reviewed: Yes      History Provided by: Patient  Patient Orientation: Alert and Oriented    Patient Cognition: Alert    Hospitalization in the last 30 days (Readmission):  No    If yes, Readmission Assessment in  Navigator will be completed.    Advance Directives:      Code Status: Full Code   Patient's Primary Decision Maker is: Legal Next of Kin    Primary Decision Maker: Allie Kumar - Spouse - [phone]    Discharge Planning:    Patient lives with: Spouse/Significant Other, Children Type of Home: House  Primary Care Giver: Self  Patient Support Systems include: Spouse/Significant Other   Current Financial resources: None  Current community resources: None  Current services prior to admission: None            Current DME:              Type of Home Care services:  None    ADLS  Prior functional level: Independent in ADLs/IADLs  Current functional level: Independent in ADLs/IADLs    PT AM-PAC:   /24  OT AM-PAC:   /24    Family can provide assistance at DC: Yes  Would you like Case Management to discuss the discharge plan with any other family members/significant others, and if so, who? No  Plans to Return to Present Housing: Yes  Other Identified Issues/Barriers to RETURNING to current housing: None at this time  Potential Assistance needed at discharge: N/A            Potential DME:    Patient  expects to discharge to: House  Plan for transportation at discharge: Family    Financial    Payor: [employer] / Plan: [employer] [place] [id number] / Product Type: *No Product type* /     Does insurance require precert for SNF: Yes    Potential assistance Purchasing Medications: No  Meds-to-Beds request:        [id number] - [place], [place] - [address] - P [phone] - F [phone] [address]  [facility] [address]  Phone: [phone] Fax: [phone]      Notes:    Factors facilitating achievement of predicted outcomes: Family support, Motivated, Cooperative, Pleasant, and Sense of humor    Barriers to discharge: Dizziness    Additional Case Management Notes: Pt IPTA in [address] [place] home w/spouse and children. Active PCP and Insurance. NO DMEs reported. Pt declined needs. Will follow for needs as they arise.     The Plan for Transition of Care is related to the following treatment goals of Dizziness [R42]  Nausea [R11.0]    IF APPLICABLE: The Patient and/or patient representative Lucas and his family were provided with a choice of provider and agrees with the discharge plan. Freedom of choice list with basic dialogue that supports the patient's individualized plan of care/goals and shares the quality data associated with the providers was provided to:     Patient Representative Name:       The Patient and/or Patient Representative Agree with the Discharge Plan?      ALINA GOMES RN  Case Management Department

## 2025-03-19 NOTE — PLAN OF CARE
Problem: Discharge Planning  Goal: Discharge to home or other facility with appropriate resources  3/19/2025 1035 by Connie Boswell RN  Outcome: Progressing  3/19/2025 0211 by Alejandra Paz RN  Outcome: Progressing     Problem: Pain  Goal: Verbalizes/displays adequate comfort level or baseline comfort level  3/19/2025 1035 by Connie Boswell RN  Outcome: Progressing  3/19/2025 0211 by Alejandra Paz RN  Outcome: Progressing     Problem: Safety - Adult  Goal: Free from fall injury  3/19/2025 1035 by Connie Boswell RN  Outcome: Progressing  3/19/2025 0211 by Alejandra Paz RN  Outcome: Progressing     Problem: ABCDS Injury Assessment  Goal: Absence of physical injury  3/19/2025 1035 by Connie Boswell RN  Outcome: Progressing  3/19/2025 0211 by Alejandra Paz RN  Outcome: Progressing

## 2025-03-19 NOTE — PROGRESS NOTES
Physical Therapy  Facility/Department: [facility] [address] - MED SURG/ORTHO  Physical Therapy Initial Assessment/Treatment    Name: Lucas Kumar  : 1965  MRN: [id number]  Date of Service: 3/19/2025    Discharge Recommendations:  Home with assist PRN, Outpatient PT (possible vertigo PT)   PT Equipment Recommendations  Equipment Needed: No      Patient Diagnosis(es): The primary encounter diagnosis was Dizziness. Diagnoses of Nausea and Hyponatremia were also pertinent to this visit.  Past Medical History:  has a past medical history of Colon cancer (HCC), GERD (gastroesophageal reflux disease), Kidney stones, Malignant neoplasm of ascending colon (HCC), Postprocedural intraabdominal abscess (HCC), and Uncontrolled hypertension.  Past Surgical History:  has a past surgical history that includes Upper gastrointestinal endoscopy (N/A, 2019); hemicolectomy (Right, 2019); Appendectomy; Colonoscopy (N/A, 2019); Colonoscopy (2019); Colon surgery; Colonoscopy (N/A, 2020); hernia repair (N/A, 2022); and subtotal colectomy (Colon Cancer).    Assessment  Body Structures, Functions, Activity Limitations Requiring Skilled Therapeutic Intervention: Decreased functional mobility ;Decreased endurance;Decreased strength;Decreased balance;Decreased body mechanics;Vestibular Impairment;Decreased posture  Assessment: Pt seen for PT evaluation following admission for dizziness episode associated with hypertension, workup for TIAvsCVA pending. Prior to admission, pt was living with spouse and 3 children, independent with mobility with no AD, living in two level home and working full time. Pt currently performs bed mobility with supv, SBA for standing with no AD, however, unable to ambulate due to severe dizziness with positional changes and greatly increased BP. Pt may have potential vertigo symptoms as well, largely increased dizziness sx with vertical head turns, slightly with horizontal. Pt would  Supervision (EOB)  Balance  Sitting: Intact  Standing: Impaired (no AD, unable to ambulate due to BP and dizziness)  Transfer Training  Transfer Training: Yes  Overall Level of Assistance: Stand by assistance  Interventions: Safety awareness training;Verbal cues  Sit to Stand: Stand by assistance  Stand to Sit: Stand by assistance  Gait  Gait Training: No (see vitals, not assessed due to BP and dizziness)                       AM-PAC - Mobility    AM-PAC Basic Mobility - Inpatient   How much help is needed turning from your back to your side while in a flat bed without using bedrails?: None  How much help is needed moving from lying on your back to sitting on the side of a flat bed without using bedrails?: None  How much help is needed moving to and from a bed to a chair?: A Little  How much help is needed standing up from a chair using your arms?: A Little  How much help is needed walking in hospital room?: A Little  How much help is needed climbing 3-5 steps with a railing?: A Lot  AM-PAC Inpatient Mobility Raw Score : 19  AM-PAC Inpatient T-Scale Score : 45.44  Mobility Inpatient CMS 0-100% Score: 41.77  Mobility Inpatient CMS G-Code Modifier : CK       Goals  Short Term Goals  Time Frame for Short Term Goals: 3/26/25 (7 days) unless otherwise stated  Short Term Goal 1: pt will perform bed mobility with mod-ind  Short Term Goal 2: pt will perform functional transfers with LRAD and mod-ind  Short Term Goal 3: pt will ambulate 150 ft with LRAD and mod-ind  Short Term Goal 4: pt will perform 10-12 reps of BLE exercises by 3/23 to improve LE functional strength  Patient Goals   Patient Goals : \"to get better\"       Education  Patient Education  Education Given To: Patient  Education Provided: Role of Therapy;Plan of Care;Transfer Training  Education Provided Comments: Disease Specific Education: Pt educated on general safety during hospitalization, potential causes for dizziness, symptom management and d/c

## 2025-03-19 NOTE — ED NOTES
Lucas Kumar is a 59 y.o. male admitted for  Principal Problem:    Dizziness  Resolved Problems:    * No resolved hospital problems. *  .   Patient Home via EMS transportation with   Chief Complaint   Patient presents with    Dizziness     Pt came home from work around 1930 and suddenly felt dizzy and had a episode of vomiting. Complains of chest tightness when vomiting.   4mg IM zofran  4mg IV zofran    Chest Pain   .  Patient is alert and Person, Place, situation, time  Patient's baseline mobility: Baseline Mobility: Independent , too dizzy to ambulate   Code Status: Prior   Cardiac Rhythm:       Is patient on baseline Oxygen: no how many Liters:   Abnormal Assessment Findings:     Isolation: None      NIH Score:    C-SSRS: Risk of Suicide: No Risk  Bedside swallow:        Active LDA's:   Peripheral IV 03/18/25 Left Antecubital (Active)     Patient admitted with a kovacs:  If the kovacs is chronic was it exchanged:  Reason for kovacs:   Patient admitted with Central Line:  . PICC line placement confirmed:   Reason for Central line:   Was central line Inserted from an outside facility:        Family/Caregiver Present yes Any Concerns: no   Restraints no  Sitter no         Vitals: MEWS Score: 1    Vitals:    03/18/25 2230 03/18/25 2236 03/18/25 2252 03/18/25 2310   BP: (!) 179/113  (!) 180/110 (!) 173/96   Pulse:   76 87   Resp:   15 19   Temp:       TempSrc:       SpO2:   98% 97%   Weight:  100.4 kg (221 lb 6.4 oz)     Height:  1.753 m (5' 9\")         Last documented pain score (0-10 scale) Pain Level: 5  Pain medication administered Yes- see MAR.    Pertinent or High Risk Medications/Drips: No.    Pending Blood Product Administration: no    Abnormal labs:   Abnormal Labs Reviewed   CBC WITH AUTO DIFFERENTIAL - Abnormal; Notable for the following components:       Result Value    WBC 11.9 (*)     Neutrophils Absolute 9.4 (*)     All other components within normal limits   COMPREHENSIVE METABOLIC PANEL W/ REFLEX TO

## 2025-03-19 NOTE — CONSULTS
Inpatient Neurology consult        [facility] Neurology      Lucas Kumar  1965    Date of Service: 3/19/2025    Referring Physician: Paolo Diallo MD      Reason for the consult and CC: Dizziness     HPI:   The patient is a 59 y.o. male with a past medical history of colon cancer s/p resection (2018), GERD, and hypertension originally presenting to the hospital for concerns of abrupt onset dizziness. Patient states yesterday (3/18), he returned home from work and parked his car in the garage and \"felt funny\". He went into his home and began to have severe dizziness described as a \"spinning\" sensation that worsened with positional changes. He went to the restroom feeling as if he would vomit and when he sat on the tile floor he was unable to get back up due to debilitating dizziness. He reports dizziness as been continuous and reports not feeling well last week but reports feeling well prior to the dizziness onset. He denies any recent head trauma, tinnitus, blurred/double vision, unilateral weakness, or speech disturbance. He also endorses tightness in his shoulders. Neurology has been consulted for further evaluation and management of dizziness.        Constitutional:   Vitals:    03/19/25 0024 03/19/25 0045 03/19/25 0245 03/19/25 0929   BP: (!) 165/111 (!) 172/105 (!) 148/86 (!) 149/113   Pulse: 74 72 82 75   Resp: 16 18 16 18   Temp:  98.4 °F (36.9 °C) 98.2 °F (36.8 °C) 98 °F (36.7 °C)   TempSrc:  Oral  Oral   SpO2: 98% 96% 93% 97%   Weight:  92.7 kg (204 lb 4.8 oz)     Height:  1.727 m (5' 8\")           I personally reviewed and updated social history, past medical history, medications, allergy, surgical history, and family history as documented in the patient's electronic health records.       ROS: 10-14 ROS reviewed with the patient/nurse/family which were unremarkable except mentioned in H&P.    General appearance:  Normal development and appear in no acute distress.   Mental Status:

## 2025-03-19 NOTE — ED NOTES
Code stroke called @ 2234  Ct called @ 2234   stroke called @ 2234  Lab called @ 2235   stroke called back @ 2239 (Yazmin Mckoy MD)

## 2025-03-19 NOTE — PROGRESS NOTES
4 Eyes Skin Assessment     NAME:  Lucas Kumar  YOB: 1965  MEDICAL RECORD NUMBER:  [id number]    The patient is being assessed for  Admission    I agree that at least one RN has performed a thorough Head to Toe Skin Assessment on the patient. ALL assessment sites listed below have been assessed.      Areas assessed by both nurses:    Head, Face, Ears, Shoulders, Back, Chest, Arms, Elbows, Hands, Sacrum. Buttock, Coccyx, Ischium, and Legs. Feet and Heels        Does the Patient have a Wound? No noted wound(s)       Tesfaye Prevention initiated by RN: No  Wound Care Orders initiated by RN: No    Pressure Injury (Stage 3,4, Unstageable, DTI, NWPT, and Complex wounds) if present, place Wound referral order by RN under : No    New Ostomies, if present place, Ostomy referral order under : No     Nurse 1 eSignature: Electronically signed by Alejandra Paz RN on 3/19/25 at 3:19 AM EDT    **SHARE this note so that the co-signing nurse can place an eSignature**    Nurse 2 eSignature: {Esignature:[id number]}

## 2025-03-20 VITALS
RESPIRATION RATE: 18 BRPM | OXYGEN SATURATION: 95 % | DIASTOLIC BLOOD PRESSURE: 94 MMHG | SYSTOLIC BLOOD PRESSURE: 132 MMHG | WEIGHT: 204.3 LBS | TEMPERATURE: 98.2 F | HEIGHT: 68 IN | HEART RATE: 78 BPM | BODY MASS INDEX: 30.96 KG/M2

## 2025-03-20 PROCEDURE — 6360000002 HC RX W HCPCS: Performed by: NURSE PRACTITIONER

## 2025-03-20 PROCEDURE — G0378 HOSPITAL OBSERVATION PER HR: HCPCS

## 2025-03-20 PROCEDURE — 36415 COLL VENOUS BLD VENIPUNCTURE: CPT

## 2025-03-20 PROCEDURE — 6370000000 HC RX 637 (ALT 250 FOR IP): Performed by: NURSE PRACTITIONER

## 2025-03-20 PROCEDURE — 2500000003 HC RX 250 WO HCPCS: Performed by: NURSE PRACTITIONER

## 2025-03-20 PROCEDURE — 6370000000 HC RX 637 (ALT 250 FOR IP)

## 2025-03-20 PROCEDURE — 96372 THER/PROPH/DIAG INJ SC/IM: CPT

## 2025-03-20 RX ORDER — MECLIZINE HYDROCHLORIDE 25 MG/1
25 TABLET ORAL EVERY 6 HOURS
Qty: 12 TABLET | Refills: 0 | Status: SHIPPED | OUTPATIENT
Start: 2025-03-20 | End: 2025-03-23

## 2025-03-20 RX ORDER — AMLODIPINE BESYLATE 5 MG/1
5 TABLET ORAL DAILY
Qty: 30 TABLET | Refills: 0 | Status: SHIPPED | OUTPATIENT
Start: 2025-03-20 | End: 2025-03-24 | Stop reason: SDUPTHER

## 2025-03-20 RX ORDER — DIAZEPAM 5 MG/1
5 TABLET ORAL NIGHTLY
Qty: 2 TABLET | Refills: 0 | Status: SHIPPED | OUTPATIENT
Start: 2025-03-20 | End: 2025-03-22

## 2025-03-20 RX ORDER — ASPIRIN 81 MG/1
81 TABLET ORAL DAILY
Qty: 30 TABLET | Refills: 3 | Status: SHIPPED | OUTPATIENT
Start: 2025-03-21

## 2025-03-20 RX ADMIN — HYDROCHLOROTHIAZIDE 12.5 MG: 25 TABLET ORAL at 08:16

## 2025-03-20 RX ADMIN — CLOPIDOGREL BISULFATE 75 MG: 75 TABLET, FILM COATED ORAL at 08:17

## 2025-03-20 RX ADMIN — ONDANSETRON 4 MG: 4 TABLET, ORALLY DISINTEGRATING ORAL at 04:10

## 2025-03-20 RX ADMIN — PANTOPRAZOLE SODIUM 40 MG: 40 TABLET, DELAYED RELEASE ORAL at 08:16

## 2025-03-20 RX ADMIN — ASPIRIN 81 MG: 81 TABLET, COATED ORAL at 08:17

## 2025-03-20 RX ADMIN — CHOLESTYRAMINE 4 G: 4 POWDER, FOR SUSPENSION ORAL at 08:17

## 2025-03-20 RX ADMIN — AMLODIPINE BESYLATE 5 MG: 5 TABLET ORAL at 08:17

## 2025-03-20 RX ADMIN — LOSARTAN POTASSIUM 50 MG: 25 TABLET, FILM COATED ORAL at 08:16

## 2025-03-20 RX ADMIN — MECLIZINE 25 MG: 12.5 TABLET ORAL at 11:25

## 2025-03-20 RX ADMIN — SODIUM CHLORIDE, PRESERVATIVE FREE 10 ML: 5 INJECTION INTRAVENOUS at 08:18

## 2025-03-20 RX ADMIN — ONDANSETRON 4 MG: 4 TABLET, ORALLY DISINTEGRATING ORAL at 10:33

## 2025-03-20 RX ADMIN — ENOXAPARIN SODIUM 40 MG: 100 INJECTION SUBCUTANEOUS at 08:17

## 2025-03-20 RX ADMIN — MECLIZINE 25 MG: 12.5 TABLET ORAL at 06:54

## 2025-03-20 NOTE — DISCHARGE SUMMARY
Hospital Medicine Discharge Summary    Patient: Lucas Kumar   : 1965     Hospital:  [facility]  Admit Date: 3/18/2025   Discharge Date: 3/20/2025    Disposition:  [x]Home   []HHC  []SNF  []Acute Rehab  []LTAC  []Hospice  Code status:  [x]Full  []DNR/CCA  []Limited (DNR/CCA with Do Not Intubate)  []DNRCC  Condition at Discharge: Stable  Primary Care Provider: Shelli King, APRN - CNP    Admitting Provider: Johanny Lopez MD  Discharge Provider: Paolo Diallo MD     Discharge Diagnoses:      Active Hospital Problems    Diagnosis     Vestibular neuronitis of right ear [H81.21]     Dizziness [R42]        Presenting Admission History:      59 y.o. male who presented to [facility] with dizziness. PMHx significant for obesity, hypertension, GERD and colon cancer. History obtained from the patient and review of EMR. Patient stated he felt well all day today. However, he states when he got home from work around 730 8:00 he began to experience some dizziness. He describes the dizziness as feeling like the \"room is spinning\". Movement makes the dizziness worse. He denies any history of vertigo, any type of dizziness and/or TIA/CVA. In the emergency department a CT head was obtained that revealed no acute intracranial abnormality. CTA head and neck was also obtained that revealed no flow significant stenosis within the head or neck. The stroke team was consulted in the emergency department and the patient was not a TNKase candidate, but recommended that the patient be started on aspirin and Plavix. The patient was given Zofran and meclizine which she stated did help slightly. He also received 500 mL of normal saline. Upon further evaluation, the patient was found to have a leukocytosis. This is likely secondary to stress response due to no other signs or symptoms of infection. The patient was admitted for further evaluation and treatment. An MRI of the brain  differential considerations. Electronically signed by Yuryi Lorenzo MD    CTA HEAD NECK W CONTRAST  Result Date: 3/18/2025  CT angiography of the head and neck with contrast HISTORY:Stroke COMPARISON:CT brain performed earlier today. TECHNIQUE: Multidetector CT imaging of the head and neck was performed following administration of intravenous contrast material in the arterial phase. 3-D MPR and MIP images were retrospectively generated at a separate workstation. Individualized dose optimization technique was used in order to meet ALARA standards for radiation dose reduction.  In addition to vendor specific dose reduction algorithms, the dose reduction techniques vary based on the specific scanner utilized but frequently include automated exposure control, adjustment of the mA and/or kV according to patient size, and use of iterative reconstruction technique. FINDINGS: There is standard aortic arch anatomy. The imaged subclavian arteries are patent. The origin of the left common carotid artery is normal. The cervical course of the left common carotid artery is unremarkable. The left carotid bulb is normal. The origin of the left internal carotid artery is normal. The left carotid terminus is unremarkable. Posterior communicating artery unremarkable. The anterior cerebral arteries are patent. There is an anterior communicating artery which is unremarkable. The middle cerebral arteries are patent. The origin of the right common carotid artery is normal. The cervical course of the right common carotid artery is unremarkable.  The left carotid bulb is normal. The origin of the right internal carotid artery is normal. The right carotid terminus is unremarkable. The origin of the left vertebral artery is normal. The cervical course of the left vertebral artery is unremarkable. The intradural segment of the left vertebral artery is patent. The origin of the right vertebral artery is normal. The cervical course of the right

## 2025-03-20 NOTE — CONSULTS
Telemedicine Consult Note   Stroke Team        Patient Name: Lucas Kumar (59 y.o. male)  MRN: [id number]  : 1965  Admission Date: 3/18/2025   Current Date: 25    STROKE TIMELINE     ED arrival time: ED Arrival     TENECTEPLASE (TNKase) bolus time: N/A    Chief Complaint     Chief Complaint   Patient presents with    Dizziness     Pt came home from work around 1930 and suddenly felt dizzy and had a episode of vomiting. Complains of chest tightness when vomiting.   4mg IM zofran  4mg IV zofran    Chest Pain       History of Present Illness   Mr Kumar is a 58yo M with PMHx colon ca and HTN who is presenting from home with acute onset vertigo.    The patient was in his usual state of health when he came home from work at 7:30pm.  The patients sx \"ramped up\" over the course of several min, then were so bad that he could not move.  On arrival to the ED, the patient was still having a lot of dizziness even at rest.      He has not absolute contraindications to TNK.    Past Medical History:   Diagnosis Date    Colon cancer (HCC) 2019    GERD (gastroesophageal reflux disease)     resolved    Kidney stones     Malignant neoplasm of ascending colon (HCC) 2019    Postprocedural intraabdominal abscess (HCC) 2019    Uncontrolled hypertension 2019        Past Surgical History:   Procedure Laterality Date    APPENDECTOMY      COLON SURGERY      I & D of colon abscess [post op    COLONOSCOPY N/A 2019    COLONOSCOPY WITH BIOPSY performed by Dinh Heller MD at [facility] ENDOSCOPY    COLONOSCOPY  2019    COLONOSCOPY POLYPECTOMY SNARE/COLD BIOPSY performed by Dinh Heller MD at [facility] ENDOSCOPY    COLONOSCOPY N/A 2020    COLONOSCOPY POLYPECTOMY SNARE performed by Dinh Heller MD at [facility] ENDOSCOPY    HEMICOLECTOMY Right 2019    ROBOTIC LAPAROSCOPIC RIGHT COLECTOMY CPT CODE - 54285 performed by Bobby Diamond MD at [facility] OR      Homeless in the Last Year: No       Family History   Problem Relation Age of Onset    Other Mother         diverticulitis    High Cholesterol Mother     Diabetes Father     Cancer Father         Stage 4 Cancer - [place]atter, Colon , liver, Brain stem cancer    Coronary Art Dis Father         angioplasty    Heart Attack Father     High Cholesterol Father     Stroke Maternal Grandmother        No current facility-administered medications on file prior to encounter.     Current Outpatient Medications on File Prior to Encounter   Medication Sig Dispense Refill    amitriptyline (ELAVIL) 50 MG tablet TAKE 1 TABLET BY MOUTH EVERY DAY AT NIGHT 90 tablet 0    losartan-hydroCHLOROthiazide (HYZAAR) 50-12.5 MG per tablet Take 1 tablet by mouth daily 90 tablet 1    fluticasone (FLONASE) 50 MCG/ACT nasal spray 2 SPRAYS BY EACH NOSTRIL ROUTE DAILY SHAKE LIQUID 48 g 1    pantoprazole (PROTONIX) 40 MG tablet Take 1 tablet by mouth daily      colestipol (COLESTID) 1 g tablet TAKE 1 TABLET BY MOUTH TWICE A  tablet 0       Review of Systems   No recent illnesses, weight loss, URI, fullness in ear, throat pain, but does have chronic tinnitus.    Physical Exam     BP at time of consultation 179/113    NIH Stroke Scale  NIHSS: 0    I could not appreciate nystagmus on tele when the patient was at rest  No ataxia  He was not able to ambulate immediately prior to tele, so I did not want to make him     Labs:  CBC:   Recent Labs     03/19/25  0504   WBC 9.1   HGB 14.9        BMP:    Recent Labs     03/19/25  0504      K 3.8      CO2 26   BUN 13   CREATININE 0.9   GLUCOSE 121*     Ca/Mg/Phos:   Recent Labs     03/19/25  0504   CALCIUM 8.8     Troponin: No results for input(s): \"TROPONINI\" in the last 72 hours.  BNP: No results for input(s): \"BNP\" in the last 72 hours.  Lipids:   Recent Labs     03/19/25  0504   CHOL 179   TRIG 219*   HDL 33*     ABGs: No results for input(s): \"PHART\", \"PO2ART\", \"SRZ0IGL\" in the last

## 2025-03-20 NOTE — PROGRESS NOTES
Discharge instructions reviewed with patient. IV and telemetry have been removed. Will transport home via private vehicle with spouse

## 2025-03-20 NOTE — CARE COORDINATION
CASE MANAGEMENT DISCHARGE SUMMARY      Discharge to: Home w/spouse. IPTA. Declined needs.    New Durable Medical Equipment ordered/agency: n/a    Transportation:    Family/car:private     Confirmed discharge plan with:     Patient: yes     Family:  spouse on phone w/pt when writer at bedside.      RN, name: Connie

## 2025-03-24 ENCOUNTER — OFFICE VISIT (OUTPATIENT)
Dept: FAMILY MEDICINE CLINIC | Age: 60
End: 2025-03-24

## 2025-03-24 ENCOUNTER — TELEPHONE (OUTPATIENT)
Dept: FAMILY MEDICINE CLINIC | Age: 60
End: 2025-03-24

## 2025-03-24 VITALS
RESPIRATION RATE: 16 BRPM | DIASTOLIC BLOOD PRESSURE: 102 MMHG | TEMPERATURE: 97.8 F | SYSTOLIC BLOOD PRESSURE: 158 MMHG | HEART RATE: 96 BPM | WEIGHT: 202 LBS | BODY MASS INDEX: 30.71 KG/M2 | OXYGEN SATURATION: 98 %

## 2025-03-24 DIAGNOSIS — I10 PRIMARY HYPERTENSION: ICD-10-CM

## 2025-03-24 DIAGNOSIS — H81.21 VESTIBULAR NEURONITIS OF RIGHT EAR: Primary | ICD-10-CM

## 2025-03-24 DIAGNOSIS — Z09 HOSPITAL DISCHARGE FOLLOW-UP: ICD-10-CM

## 2025-03-24 RX ORDER — MECLIZINE HYDROCHLORIDE 25 MG/1
25 TABLET ORAL 3 TIMES DAILY PRN
COMMUNITY
End: 2025-03-24 | Stop reason: SDUPTHER

## 2025-03-24 RX ORDER — AMLODIPINE BESYLATE 10 MG/1
10 TABLET ORAL DAILY
Qty: 30 TABLET | Refills: 5 | Status: SHIPPED | OUTPATIENT
Start: 2025-03-24

## 2025-03-24 RX ORDER — MECLIZINE HYDROCHLORIDE 25 MG/1
25 TABLET ORAL 3 TIMES DAILY PRN
Qty: 30 TABLET | Refills: 0 | Status: SHIPPED | OUTPATIENT
Start: 2025-03-24 | End: 2025-04-03

## 2025-03-24 RX ORDER — DIAZEPAM 5 MG/1
5 TABLET ORAL NIGHTLY
Qty: 10 TABLET | Refills: 0 | Status: SHIPPED | OUTPATIENT
Start: 2025-03-24 | End: 2025-04-03

## 2025-03-24 RX ORDER — DIAZEPAM 5 MG/1
5 TABLET ORAL EVERY 6 HOURS PRN
COMMUNITY
End: 2025-03-24 | Stop reason: SDUPTHER

## 2025-03-24 ASSESSMENT — PATIENT HEALTH QUESTIONNAIRE - PHQ9
SUM OF ALL RESPONSES TO PHQ QUESTIONS 1-9: 0
1. LITTLE INTEREST OR PLEASURE IN DOING THINGS: NOT AT ALL
2. FEELING DOWN, DEPRESSED OR HOPELESS: NOT AT ALL
SUM OF ALL RESPONSES TO PHQ QUESTIONS 1-9: 0

## 2025-03-24 ASSESSMENT — ENCOUNTER SYMPTOMS
RESPIRATORY NEGATIVE: 1
GASTROINTESTINAL NEGATIVE: 1

## 2025-03-24 NOTE — TELEPHONE ENCOUNTER
----- Message from Delmar JACOBSEN sent at 3/24/2025  2:07 PM EDT -----  Regarding: ECC Message to Provider  ECC Message to Provider    Relationship to Patient: Self     Additional Information Patient called to inform his PCP that the specialist or the ENT that was referred to the patient is not available until April 21, 2025. Patient called already the specialist but he was informed that the first available will be on April 22, 2025.  --------------------------------------------------------------------------------------------------------------------------    Call Back Information: OK to leave message on voicemail  Preferred Call Back Number: [phone]

## 2025-03-24 NOTE — PROGRESS NOTES
at this time  Outpatient Medications Marked as Taking for the 3/24/25 encounter (Office Visit) with Shelli King APRN - CNP   Medication Sig Dispense Refill    diazePAM (VALIUM) 5 MG tablet Take 1 tablet by mouth every 6 hours as needed for Anxiety. Max Daily Amount: 20 mg      meclizine (ANTIVERT) 25 MG tablet Take 1 tablet by mouth 3 times daily as needed for Dizziness      aspirin 81 MG EC tablet Take 1 tablet by mouth daily 30 tablet 3    amLODIPine (NORVASC) 5 MG tablet Take 1 tablet by mouth daily Pts prescription ran out, needs more. 30 tablet 0    amitriptyline (ELAVIL) 50 MG tablet TAKE 1 TABLET BY MOUTH EVERY DAY AT NIGHT 90 tablet 0    losartan-hydroCHLOROthiazide (HYZAAR) 50-12.5 MG per tablet Take 1 tablet by mouth daily 90 tablet 1    fluticasone (FLONASE) 50 MCG/ACT nasal spray 2 SPRAYS BY EACH NOSTRIL ROUTE DAILY SHAKE LIQUID 48 g 1    pantoprazole (PROTONIX) 40 MG tablet Take 1 tablet by mouth daily      colestipol (COLESTID) 1 g tablet TAKE 1 TABLET BY MOUTH TWICE A  tablet 0        Medications patient taking as of now reconciled against medications ordered at time of hospital discharge: Yes    Review of Systems   Constitutional:  Positive for activity change. Negative for fatigue.   Respiratory: Negative.     Cardiovascular: Negative.    Gastrointestinal: Negative.    Neurological:  Positive for dizziness and headaches. Negative for tremors, syncope, facial asymmetry, speech difficulty, weakness and light-headedness.   Psychiatric/Behavioral:  The patient is nervous/anxious.          Objective:    BP (!) 150/98   Pulse 96   Temp 97.8 °F (36.6 °C)   Resp 16   Wt 91.6 kg (202 lb)   SpO2 98%   BMI 30.71 kg/m²     Physical Exam  Vitals and nursing note reviewed.   Constitutional:       General: He is not in acute distress.     Appearance: He is well-developed. He is not diaphoretic.   HENT:      Head: Normocephalic and atraumatic.      Right Ear: External ear normal. Tympanic

## 2025-03-24 NOTE — PATIENT INSTRUCTIONS
Check blood pressure daily  Increased amlodipine to 10 mg  Continue meclizine for dizziness  Schedule with ENT and vestibular therapy

## 2025-03-26 ENCOUNTER — HOSPITAL ENCOUNTER (OUTPATIENT)
Dept: PHYSICAL THERAPY | Age: 60
Setting detail: THERAPIES SERIES
Discharge: HOME | End: 2025-03-26
Payer: COMMERCIAL

## 2025-03-26 DIAGNOSIS — G44.86 CERVICOGENIC HEADACHE: ICD-10-CM

## 2025-03-26 DIAGNOSIS — E87.8 DISEQUILIBRIUM SYNDROME: ICD-10-CM

## 2025-03-26 DIAGNOSIS — R26.89 ABNORMALITY OF GAIT DUE TO IMPAIRMENT OF BALANCE: ICD-10-CM

## 2025-03-26 DIAGNOSIS — H83.2X9 VESTIBULAR HYPOFUNCTION, UNSPECIFIED LATERALITY: Primary | ICD-10-CM

## 2025-03-26 PROCEDURE — 97112 NEUROMUSCULAR REEDUCATION: CPT

## 2025-03-26 PROCEDURE — 95992 CANALITH REPOSITIONING PROC: CPT

## 2025-03-26 PROCEDURE — 97161 PT EVAL LOW COMPLEX 20 MIN: CPT

## 2025-03-26 NOTE — PLAN OF CARE
[facility] - Outpatient Rehabilitation and Therapy:  [place] [address] office: [phone] fax: [phone]     Physical Therapy Initial Evaluation Certification      Dear HALI Lazaro*,    We had the pleasure of evaluating the following patient for physical therapy services at [facility].  A summary of our findings can be found in the initial assessment below.  This includes our plan of care.  If you have any questions or concerns regarding these findings, please do not hesitate to contact me at the office phone number listed above.  Thank you for the referral.     Physician Signature:_______________________________Date:__________________  By signing above (or electronic signature), therapist’s plan is approved by physician       Physical Therapy: TREATMENT/PROGRESS NOTE   Patient: Lucas Kumar (59 y.o. male)   Examination Date: 2025   :  1965 MRN: [id number]   Visit #: 1   Insurance Allowable Auth Needed   20 visits per arvin year.  Pt pays 20% []Yes    []No    Insurance: Payor: [employer] / Plan: [employer] [place] [id number] / Product Type: *No Product type* /   Insurance ID: [id number] - (Commercial)  Secondary Insurance (if applicable):    Treatment Diagnosis:     ICD-10-CM    1. Vestibular hypofunction, unspecified laterality  H83.2X9       2. Disequilibrium syndrome  E87.8       3. Abnormality of gait due to impairment of balance  R26.89       4. Cervicogenic headache  G44.86          Medical Diagnosis:  Vestibular neuronitis of right ear [H81.21]   Referring Physician: Shelli King A*  PCP: Shelli King, APRN - CNP       Plan of care signed (Y/N):     Date of Patient follow up with Physician:      Plan of Care Report: EVAL today  POC update due: (10 visits /OR AUTH LIMITS, whichever is less)  2025                                             Medical  Yes

## 2025-04-02 ENCOUNTER — HOSPITAL ENCOUNTER (OUTPATIENT)
Dept: PHYSICAL THERAPY | Age: 60
Setting detail: THERAPIES SERIES
Discharge: HOME | End: 2025-04-02
Payer: COMMERCIAL

## 2025-04-02 PROCEDURE — 97112 NEUROMUSCULAR REEDUCATION: CPT

## 2025-04-02 NOTE — FLOWSHEET NOTE
[facility] - Outpatient Rehabilitation and Therapy:  [place] [address] office: [phone] fax: [phone]         Physical Therapy: TREATMENT/PROGRESS NOTE   Patient: Lucas Kumar (59 y.o. male)   Examination Date: 2025   :  1965 MRN: [id number]   Visit #: 2   Insurance Allowable Auth Needed   20 visits per arvin year.  Pt pays 20% []Yes    []No    Insurance: Payor: [employer] / Plan: [employer] [place] [id number] / Product Type: *No Product type* /   Insurance ID: [id number] - (Commercial)  Secondary Insurance (if applicable):    Treatment Diagnosis:     ICD-10-CM    1. Vestibular hypofunction, unspecified laterality  H83.2X9       2. Disequilibrium syndrome  E87.8       3. Abnormality of gait due to impairment of balance  R26.89       4. Cervicogenic headache  G44.86          Medical Diagnosis:  Vestibular neuronitis of right ear [H81.21]   Referring Physician: Shelli King A*  PCP: Shelli King APRN - CNP       Plan of care signed (Y/N):     Date of Patient follow up with Physician:      Plan of Care Report: EVAL today  POC update due: (10 visits /OR AUTH LIMITS, whichever is less)  2025                                             Medical History:  Comorbidities:  Cancer/Tumor  Hypertension  Relevant Medical History: (+) nausea, vertigo, migraine, HA, (+) fall/ LOB                                         Precautions/ Contra-indications:           Latex allergy:  NO  Pacemaker:    NO  Contraindications for Manipulation: NA  Date of Surgery: NA  Other:Hospitalized for 3 days.  Onset 3/18/25    Red Flags:  None    Suicide Screening:   The patient did not verbalize a primary behavioral concern, suicidal ideation, suicidal intent, or demonstrate suicidal behaviors.    Preferred Language for Healthcare:   [x] English       [] other:    SUBJECTIVE EXAMINATION     Patient stated complaint/comment: Pt states that he is doing better than last week.

## 2025-04-07 ENCOUNTER — OFFICE VISIT (OUTPATIENT)
Dept: FAMILY MEDICINE CLINIC | Age: 60
End: 2025-04-07
Payer: COMMERCIAL

## 2025-04-07 VITALS
TEMPERATURE: 97.6 F | BODY MASS INDEX: 30.71 KG/M2 | OXYGEN SATURATION: 96 % | RESPIRATION RATE: 16 BRPM | WEIGHT: 202 LBS | SYSTOLIC BLOOD PRESSURE: 136 MMHG | DIASTOLIC BLOOD PRESSURE: 84 MMHG | HEART RATE: 86 BPM

## 2025-04-07 DIAGNOSIS — I10 PRIMARY HYPERTENSION: Primary | ICD-10-CM

## 2025-04-07 DIAGNOSIS — H81.21 VESTIBULAR NEURONITIS OF RIGHT EAR: ICD-10-CM

## 2025-04-07 PROCEDURE — 3079F DIAST BP 80-89 MM HG: CPT | Performed by: NURSE PRACTITIONER

## 2025-04-07 PROCEDURE — 3075F SYST BP GE 130 - 139MM HG: CPT | Performed by: NURSE PRACTITIONER

## 2025-04-07 PROCEDURE — G2211 COMPLEX E/M VISIT ADD ON: HCPCS | Performed by: NURSE PRACTITIONER

## 2025-04-07 PROCEDURE — 99214 OFFICE O/P EST MOD 30 MIN: CPT | Performed by: NURSE PRACTITIONER

## 2025-04-07 ASSESSMENT — PATIENT HEALTH QUESTIONNAIRE - PHQ9
SUM OF ALL RESPONSES TO PHQ QUESTIONS 1-9: 0
2. FEELING DOWN, DEPRESSED OR HOPELESS: NOT AT ALL
SUM OF ALL RESPONSES TO PHQ QUESTIONS 1-9: 0
1. LITTLE INTEREST OR PLEASURE IN DOING THINGS: NOT AT ALL

## 2025-04-07 NOTE — PROGRESS NOTES
4/7/2025    This is a 59 y.o. male   Chief Complaint   Patient presents with    Hypertension     Ranging from 138/95    .    HPI    History of Present Illness  The patient presents for evaluation of blood pressure and vertigo.    He reports a slight elevation in his home blood pressure readings, with systolic values in the 130s and diastolic values in the 90s. An episode of chest tightness was experienced last night, which disrupted sleep. Despite this, an overall improvement in blood pressure is noted compared to the previous visit. Current medications include losartan/hydrochlorothiazide and amlodipine for blood pressure management. Exercise has not been possible for the past 2 weeks due to his health condition, but he plans to resume gym activities soon, anticipating further improvement in blood pressure.    Two therapy sessions for vertigo have been attended, which have been beneficial. An appointment with the doctor was not possible as the earliest available slot was at the end of 04/2025. The therapist suggested that symptoms could be due to a minor issue affecting the inner ear, despite CT and MRI results indicating no stroke. Exercises were performed to rule out this possibility, including visual exercises involving rapid head movements and eye tracking. These exercises revealed some residual inflammation in the nerve. During the second session, additional exercises were conducted, which were responded to well, although minor issues with distance vision, such as halos and shadows, persist. Progress is being monitored using an application, with a current score of 16 out of 28. Significant improvement is reported, with the ability to drive and walk without difficulty. Three upcoming therapy appointments are scheduled for Wednesdays, but this week's appointment will need to be rescheduled due to work commitments. A work note is not required at this time.    A physical examination is due at the end of 05/2025.

## 2025-04-09 ENCOUNTER — APPOINTMENT (OUTPATIENT)
Dept: PHYSICAL THERAPY | Age: 60
End: 2025-04-09
Payer: COMMERCIAL

## 2025-04-10 ENCOUNTER — OFFICE VISIT (OUTPATIENT)
Dept: FAMILY MEDICINE CLINIC | Age: 60
End: 2025-04-10
Payer: COMMERCIAL

## 2025-04-10 VITALS
RESPIRATION RATE: 16 BRPM | SYSTOLIC BLOOD PRESSURE: 136 MMHG | TEMPERATURE: 97.9 F | OXYGEN SATURATION: 95 % | WEIGHT: 201 LBS | BODY MASS INDEX: 30.56 KG/M2 | DIASTOLIC BLOOD PRESSURE: 84 MMHG | HEART RATE: 95 BPM

## 2025-04-10 DIAGNOSIS — B02.9 HERPES ZOSTER WITHOUT COMPLICATION: Primary | ICD-10-CM

## 2025-04-10 PROCEDURE — 3079F DIAST BP 80-89 MM HG: CPT | Performed by: NURSE PRACTITIONER

## 2025-04-10 PROCEDURE — 3075F SYST BP GE 130 - 139MM HG: CPT | Performed by: NURSE PRACTITIONER

## 2025-04-10 PROCEDURE — 99213 OFFICE O/P EST LOW 20 MIN: CPT | Performed by: NURSE PRACTITIONER

## 2025-04-10 RX ORDER — VALACYCLOVIR HYDROCHLORIDE 1 G/1
1000 TABLET, FILM COATED ORAL 3 TIMES DAILY
Qty: 21 TABLET | Refills: 0 | Status: SHIPPED | OUTPATIENT
Start: 2025-04-10 | End: 2025-04-17

## 2025-04-10 RX ORDER — NAPROXEN 500 MG/1
500 TABLET ORAL 2 TIMES DAILY WITH MEALS
Qty: 20 TABLET | Refills: 0 | Status: SHIPPED | OUTPATIENT
Start: 2025-04-10 | End: 2025-04-20

## 2025-04-10 ASSESSMENT — ENCOUNTER SYMPTOMS
EYE REDNESS: 0
EYE DISCHARGE: 1
PHOTOPHOBIA: 0
EYE ITCHING: 0
RESPIRATORY NEGATIVE: 1

## 2025-04-10 NOTE — PROGRESS NOTES
4/10/2025    This is a 59 y.o. male   Chief Complaint   Patient presents with    Rash     Rt  side back up into neck around ear last week sometime clothing touches it irritates it and still has headache in back of head still,    .    HPI    History of Present Illness  The patient presents for evaluation of a rash.    The rash began last week, initially presenting as a single lesion that was dismissed as a pimple. However, the condition has progressively worsened, prompting medical attention. The rash is localized to the right side spine and neck, with no other areas affected. It is mildly pruritic and causes discomfort upon palpation. Pain is rated as a 3 on a scale of 0 to 10, described as non-debilitating but exacerbated by touch. Persistent headaches in the occipital region and excessive tearing from the right eye are reported. There is a history of shingles, which the patient believes may be similar to the current symptoms, although less severe. Pain has been managed with Advil, reported as effective. One dose of the shingles vaccine has been received, with the second dose scheduled.    Blood pressure has been elevated since the onset of these symptoms, attributed to anxiety related to a cancer diagnosis. Poor sleep quality is also reported due to this anxiety. Despite these issues, daily activities and work are managed without significant difficulty.    The patient has a history of intolerance to prednisone, which caused severe gastrointestinal upset and low blood counts.     Patient Active Problem List   Diagnosis    Polyp of transverse colon    Bile acid malabsorption syndrome    Anemia, chronic disease    Sebaceous cyst    Suspected sleep apnea    Hypertensive disorder    Bilateral nephrolithiasis    Polyp of descending colon    Colon, diverticulosis    History of colon cancer    Insomnia    History of colectomy    Irritable bowel syndrome with diarrhea    History of incisional hernia repair    Pulmonary

## 2025-04-16 ENCOUNTER — APPOINTMENT (OUTPATIENT)
Dept: PHYSICAL THERAPY | Age: 60
End: 2025-04-16
Payer: COMMERCIAL

## 2025-04-23 ENCOUNTER — APPOINTMENT (OUTPATIENT)
Dept: PHYSICAL THERAPY | Age: 60
End: 2025-04-23
Payer: COMMERCIAL

## 2025-05-22 RX ORDER — FLUTICASONE PROPIONATE 50 MCG
SPRAY, SUSPENSION (ML) NASAL
Qty: 48 ML | Refills: 1 | Status: SHIPPED | OUTPATIENT
Start: 2025-05-22

## 2025-05-27 ENCOUNTER — TELEPHONE (OUTPATIENT)
Dept: FAMILY MEDICINE CLINIC | Age: 60
End: 2025-05-27

## 2025-05-27 DIAGNOSIS — L98.9 SKIN LESION: Primary | ICD-10-CM

## 2025-05-27 NOTE — TELEPHONE ENCOUNTER
Patient is calling in for a referral for dermatology patient states other one was closed to new patients

## 2025-05-28 NOTE — TELEPHONE ENCOUNTER
Notified pt that luz will be back on Monday for him to call his insurance and see who is in his network.  [facility]

## 2025-06-18 DIAGNOSIS — I10 PRIMARY HYPERTENSION: ICD-10-CM

## 2025-06-18 RX ORDER — LOSARTAN POTASSIUM AND HYDROCHLOROTHIAZIDE 12.5; 5 MG/1; MG/1
1 TABLET ORAL DAILY
Qty: 90 TABLET | Refills: 0 | Status: SHIPPED | OUTPATIENT
Start: 2025-06-18 | End: 2025-06-20

## 2025-06-19 DIAGNOSIS — K21.00 GASTROESOPHAGEAL REFLUX DISEASE WITH ESOPHAGITIS WITHOUT HEMORRHAGE: ICD-10-CM

## 2025-06-19 DIAGNOSIS — I10 PRIMARY HYPERTENSION: ICD-10-CM

## 2025-06-20 RX ORDER — LOSARTAN POTASSIUM AND HYDROCHLOROTHIAZIDE 12.5; 5 MG/1; MG/1
1 TABLET ORAL DAILY
Qty: 90 TABLET | Refills: 1 | Status: SHIPPED | OUTPATIENT
Start: 2025-06-20

## 2025-06-20 RX ORDER — AMITRIPTYLINE HYDROCHLORIDE 50 MG/1
50 TABLET ORAL NIGHTLY
Qty: 90 TABLET | Refills: 0 | Status: SHIPPED | OUTPATIENT
Start: 2025-06-20

## (undated) DEVICE — GOWN,SIRUS,NON REINFRCD,LARGE,SET IN SL: Brand: MEDLINE

## (undated) DEVICE — Device

## (undated) DEVICE — SOLUTION IV IRRIG 500ML 0.9% SODIUM CHL 2F7123

## (undated) DEVICE — TUBING FLTR PLUME AWAY EVAC W/ SUCT DEV DISP PUREVIEW

## (undated) DEVICE — SOLUTION IRRIG 2000ML STRL H2O UROMATIC PLAS CONT USP

## (undated) DEVICE — ELECTRODE ECG MONITR FOAM TEAR DROP ADLT RED

## (undated) DEVICE — PMI DISPOSABLE PUNCTURE CLOSURE DEVICE / SUTURE GRASPER: Brand: PMI

## (undated) DEVICE — TUBING, SUCTION, 3/16" X 12', STRAIGHT: Brand: MEDLINE

## (undated) DEVICE — TIP COVER ACCESSORY

## (undated) DEVICE — ARM DRAPE

## (undated) DEVICE — MEDI-VAC NON-CONDUCTIVE SUCTION TUBING: Brand: CARDINAL HEALTH

## (undated) DEVICE — 3M™ TEGADERM™ TRANSPARENT FILM DRESSING FRAME STYLE, 1624W, 2-3/8 IN X 2-3/4 IN (6 CM X 7 CM), 100/CT 4CT/CASE: Brand: 3M™ TEGADERM™

## (undated) DEVICE — PENCIL ES L3M BTTN SWCH S STL HEX LOK BLDE ELECTRD HOLSTER

## (undated) DEVICE — GLOVE SURG SZ 75 L12IN FNGR THK94MIL STD WHT LTX FREE

## (undated) DEVICE — SCISSORS SURG DIA8MM MPLR CRV ENDOWRIST

## (undated) DEVICE — AIRLIFE™ NASAL OXYGEN CANNULA CURVED, FLARED TIP, WITH 7 FEET (2.1 M) CRUSH RESISTANT TUBING, OVER-THE-EAR STYLE: Brand: AIRLIFE™

## (undated) DEVICE — SUTURE STRATAFIX SPRL MCRYL + SZ 3 0 L8IN ABSRB UD L26MM SH SXMP1B427

## (undated) DEVICE — SUTURE STRATAFIX SYMMETRIC PDS + SZ 0 L18IN ABSRB VLT CT 2 SXPP1A407

## (undated) DEVICE — SET GRAV VENT NVENT CK VLV 3 NDL FREE PRT 10 GTT

## (undated) DEVICE — REDUCER: Brand: ENDOWRIST

## (undated) DEVICE — 10FR FRAZIER SUCTION HANDLE: Brand: CARDINAL HEALTH

## (undated) DEVICE — SPONGE: LAP 4X18 W XR 200/CS: Brand: MEDICAL ACTION INDUSTRIES

## (undated) DEVICE — Z DISCONTINUED USE 2276105 GOWN PROTCT UNIV CHST W28IN L49IN SL 24IN BLU SPUNBOND FLM

## (undated) DEVICE — SUTURE VCRL + SZ 3-0 L18IN ABSRB UD SH 1/2 CIR TAPERCUT NDL VCP864D

## (undated) DEVICE — BLADELESS OBTURATOR: Brand: WECK VISTA

## (undated) DEVICE — CANNULA NSL AD L7FT DIV O2 CO2 W/ M LUERLOCK TRMPT CONN

## (undated) DEVICE — ENDO CARRY-ON PROCEDURE KIT INCLUDES SUCTION TUBING, LUBRICANT, GAUZE, BIOHAZARD STICKER, TRANSPORT PAD AND INTERCEPT BEDSIDE KIT.: Brand: ENDO CARRY-ON PROCEDURE KIT

## (undated) DEVICE — NEEDLE SPNL 22GA L3.5IN BLK HUB S STL REG WALL FIT STYL W/

## (undated) DEVICE — CANNULA SEAL

## (undated) DEVICE — SOLUTION IV 1000ML LAC RINGERS PH 6.5 INJ USP VIAFLX PLAS

## (undated) DEVICE — SEAL

## (undated) DEVICE — TOWEL,OR,DSP,ST,BLUE,STD,8/PK,10PK/CS: Brand: MEDLINE

## (undated) DEVICE — PENCIL ES CRD L10FT HND SWCHING ROCK SWCH W/ EDGE COAT BLDE

## (undated) DEVICE — COLUMN DRAPE

## (undated) DEVICE — TISSUE RETRIEVAL SYSTEM: Brand: INZII RETRIEVAL SYSTEM

## (undated) DEVICE — CATHETER IV 20GA L1.25IN PNK FEP SFTY STR HUB RADPQ DISP

## (undated) DEVICE — ELECTRODE,RADIOTRANSLUCENT,FOAM,5PK: Brand: MEDLINE

## (undated) DEVICE — GLOVE,SURG,SENSICARE,ALOE,LF,PF,7: Brand: MEDLINE

## (undated) DEVICE — CONMED SCOPE SAVER BITE BLOCK, 20X27 MM: Brand: SCOPE SAVER

## (undated) DEVICE — SPONGE LAP W18XL18IN WHT COT 4 PLY FLD STRUNG RADPQ DISP ST

## (undated) DEVICE — SUTURE MCRYL + SZ 4-0 L18IN ABSRB UD L19MM PS-2 3/8 CIR MCP496G

## (undated) DEVICE — SUTURE NONABSORBABLE MONOFILAMENT 3-0 PS-1 18 IN BLK ETHILON 1663H

## (undated) DEVICE — SET ADMIN PRIMING 7ML L30IN 7.35LB 20 GTT 2ND RLER CLMP

## (undated) DEVICE — SUTURE STRATAFIX SPRL SZ 2 0 L9IN ABSRB VLT FS L26MM 3 8 CIR SXPD2B420

## (undated) DEVICE — LARGE NEEDLE DRIVER: Brand: ENDOWRIST

## (undated) DEVICE — TIP-UP FENESTRATED GRASPER: Brand: ENDOWRIST

## (undated) DEVICE — Device: Brand: DISPOSABLE ELECTROSURGICAL SNARE

## (undated) DEVICE — FORCEPS BX L240CM DIA2.4MM L NDL RAD JAW 4 133340

## (undated) DEVICE — ELECTRODE,ECG,STRESS,FOAM,3PK: Brand: MEDLINE

## (undated) DEVICE — SUTURE PERMAHAND SZ 3-0 L18IN NONABSORBABLE BLK L26MM SH C013D

## (undated) DEVICE — RELOAD STPL L75MM OPN H3.8MM CLS 1.5MM WIRE DIA0.2MM REG

## (undated) DEVICE — NEEDLE 25GAX5.0MM INJ CARR LOCKE

## (undated) DEVICE — SURGICAL PROCEDURE PACK IV U-BAR

## (undated) DEVICE — Device: Brand: DEFENDO VALVE AND CONNECTOR KIT

## (undated) DEVICE — FENESTRATED BIPOLAR FORCEPS: Brand: ENDOWRIST

## (undated) DEVICE — SYRINGE,EAR/ULCER, 2 OZ, STERILE: Brand: MEDLINE

## (undated) DEVICE — SUTURE VCRL + SZ 0 L27IN ABSRB VLT L26MM UR-6 5/8 CIR VCP603H

## (undated) DEVICE — SUTURE PDS II SZ 0 L60IN ABSRB VLT L48MM CTX 1/2 CIR Z990G

## (undated) DEVICE — ACCESS PLATFORM FOR MINIMALLY INVASIVE SURGERY.: Brand: GELPORT® LAPAROSCOPIC  SYSTEM

## (undated) DEVICE — GLOVE,SURG,SENSICARE SLT,LF,PF,7: Brand: MEDLINE

## (undated) DEVICE — TRAP SPEC POLYPR SGL CHMBR FN MESH SCRN

## (undated) DEVICE — TOTAL TRAY, DB, 100% SILI FOLEY, 16FR 10: Brand: MEDLINE

## (undated) DEVICE — CADIERE FORCEPS: Brand: ENDOWRIST

## (undated) DEVICE — STAPLER INT L75MM CUT LN L73MM STPL LN L77MM BLU B FRM 8

## (undated) DEVICE — Z DISCONTINUED APPLICATOR SURG PREP 0.35OZ 2% CHG 70% ISO ALC W/ HI LT

## (undated) DEVICE — FEMALE LUER ADAPTER: Brand: ARGYLE

## (undated) DEVICE — SYRINGE MED 50ML LUERLOCK TIP

## (undated) DEVICE — ELECTRODE PT RET AD L9FT HI MOIST COND ADH HYDRGEL CORDED

## (undated) DEVICE — [HIGH FLOW INSUFFLATOR,  DO NOT USE IF PACKAGE IS DAMAGED,  KEEP DRY,  KEEP AWAY FROM SUNLIGHT,  PROTECT FROM HEAT AND RADIOACTIVE SOURCES.]: Brand: PNEUMOSURE

## (undated) DEVICE — VESSEL SEALER EXTEND: Brand: ENDOWRIST

## (undated) DEVICE — MARYLAND BIPOLAR FORCEPS: Brand: ENDOWRIST

## (undated) DEVICE — X-RAY DETECTABLE SPONGES,16 PLY: Brand: VISTEC

## (undated) DEVICE — MINOR SET UP PK